# Patient Record
Sex: FEMALE | Race: WHITE | Employment: OTHER | URBAN - METROPOLITAN AREA
[De-identification: names, ages, dates, MRNs, and addresses within clinical notes are randomized per-mention and may not be internally consistent; named-entity substitution may affect disease eponyms.]

---

## 2019-01-03 ENCOUNTER — TRANSFERRED RECORDS (OUTPATIENT)
Dept: HEALTH INFORMATION MANAGEMENT | Facility: CLINIC | Age: 54
End: 2019-01-03

## 2019-05-28 ENCOUNTER — TRANSFERRED RECORDS (OUTPATIENT)
Dept: HEALTH INFORMATION MANAGEMENT | Facility: CLINIC | Age: 54
End: 2019-05-28

## 2019-05-29 ENCOUNTER — TRANSFERRED RECORDS (OUTPATIENT)
Dept: HEALTH INFORMATION MANAGEMENT | Facility: CLINIC | Age: 54
End: 2019-05-29

## 2019-06-12 NOTE — TELEPHONE ENCOUNTER
Images from [place] (detailed below) requested, reports in [facility]      US Thyroid: 5/28/19  CT Abdomen Pelvis with IV Contrast: 5/20/19  CT Chest with IV Contrast: 5/20/19    RECORDS STATUS - ALL OTHER DIAGNOSIS      RECORDS RECEIVED FROM: [place]   DATE RECEIVED:    NOTES STATUS DETAILS   OFFICE NOTE from referring provider IVANIA Tran in [facility]   OFFICE NOTE from medical oncologist     DISCHARGE SUMMARY from hospital     DISCHARGE REPORT from the ER ED [facility] - 5/13/19   OPERATIVE REPORT     MEDICATION LIST [facility] as of 6/4/19   CLINICAL TRIAL TREATMENTS TO DATE     LABS     PATHOLOGY REPORTS NA    ANYTHING RELATED TO DIAGNOSIS Epic/ 5/2819   GENONOMIC TESTING     TYPE:     IMAGING (NEED IMAGES & REPORT)     CT SCANS [place] Report in [facility] - Requested   MRI     MAMMO     ULTRASOUND [place] Report in [facility] - Requested   PET

## 2019-06-12 NOTE — TELEPHONE ENCOUNTER
ONCOLOGY INTAKE: Records Information      APPT INFORMATION:  Referring provider:  Aggie Us  Referring provider s clinic:  [facility]  Reason for visit/diagnosis:  Lung nodule  Has patient been notified of appointment date and time?: Per PT    RECORDS INFORMATION:  Were the records received with the referral (via [employer])? No    Has patient been seen for any external appt for this diagnosis? Per PT, all records and imaging @ [place]    ADDITIONAL INFORMATION:  Chest CT @ [place] on 5/20/19

## 2019-07-10 ENCOUNTER — OFFICE VISIT (OUTPATIENT)
Dept: PULMONOLOGY | Facility: CLINIC | Age: 54
End: 2019-07-10
Attending: INTERNAL MEDICINE
Payer: MEDICARE

## 2019-07-10 ENCOUNTER — PRE VISIT (OUTPATIENT)
Dept: PULMONOLOGY | Facility: CLINIC | Age: 54
End: 2019-07-10

## 2019-07-10 VITALS
HEART RATE: 86 BPM | HEIGHT: 65 IN | OXYGEN SATURATION: 93 % | SYSTOLIC BLOOD PRESSURE: 98 MMHG | WEIGHT: 106.1 LBS | DIASTOLIC BLOOD PRESSURE: 63 MMHG | RESPIRATION RATE: 18 BRPM | BODY MASS INDEX: 17.68 KG/M2

## 2019-07-10 DIAGNOSIS — R91.1 LUNG NODULE: Primary | ICD-10-CM

## 2019-07-10 DIAGNOSIS — M54.89 OTHER CHRONIC BACK PAIN: ICD-10-CM

## 2019-07-10 DIAGNOSIS — G89.29 OTHER CHRONIC BACK PAIN: ICD-10-CM

## 2019-07-10 PROCEDURE — G0463 HOSPITAL OUTPT CLINIC VISIT: HCPCS | Mod: ZF

## 2019-07-10 RX ORDER — DIAZEPAM 5 MG
15 TABLET ORAL DAILY
Status: ON HOLD | COMMUNITY
Start: 2019-06-19 | End: 2019-08-13

## 2019-07-10 RX ORDER — BACLOFEN 10 MG/1
10 TABLET ORAL 2 TIMES DAILY
COMMUNITY
Start: 2019-05-20 | End: 2019-11-16

## 2019-07-10 RX ORDER — ONDANSETRON 4 MG/1
4 TABLET, ORALLY DISINTEGRATING ORAL EVERY 6 HOURS PRN
COMMUNITY
Start: 2019-03-28

## 2019-07-10 RX ORDER — MELOXICAM 7.5 MG/1
1 TABLET ORAL
COMMUNITY
Start: 2019-06-04

## 2019-07-10 ASSESSMENT — PAIN SCALES - GENERAL: PAINLEVEL: WORST PAIN (10)

## 2019-07-10 ASSESSMENT — MIFFLIN-ST. JEOR: SCORE: 1072.15

## 2019-07-10 NOTE — PROGRESS NOTES
[facility] Nod[facility] Initial Visit    Reason for Visit  Julia Boudreaux is a 54 year old female who is referred by Dr Aggie Us for lung nodule  Pulmonary HPI    - Morning cough every day, usually dry or clear mucus maybe for a few years.   - CT done to evaluate weight loss. She was 170 in 2009. Lost about 30# last year, total weight loss over last 2-3 years. She experiences pain severe enough to be physically sick, including vomiting. She is weak, notices legs twitching. She has difficulty swallowing chicken broth but nothing else.       Other active medical problems include:   - back pain, seeking 2nd opinion. She was offered a neurostimulator     Exposure history: Denies asbestos or radon exposure   TB risk factors: No  Prior Imaging:No  Constitutional Symptoms: Night sweats  Personal history of cancer:No  Up to date on age-appropriate cancer screening:colon and pap ok, mammo overdue    ROS Pulmonary  Dyspnea: No, Cough: No, Chest pain: No, Wheezing: No, Sputum Production: No, Hemoptysis: No  A complete ROS was otherwise negative except as noted in the HPI.  The patient was seen and examined by Jennifer Allen MD   Current Outpatient Medications   Medication     DULoxetine (CYMBALTA) 60 MG capsule     GABAPENTIN     omeprazole 20 MG tablet     atorvastatin (LIPITOR) 40 MG tablet     ESTRADIOL 0.025/ESTRIOL 0.1 MG/GM CREAM     HYDROcodone-acetaminophen (VICODIN) 5-500 MG per tablet     nortriptyline (PAMELOR) 25 MG capsule     No current facility-administered medications for this visit.      Allergies   Allergen Reactions     Latex Rash     Liquid Adhesive Rash     Social History     Socioeconomic History     Marital status:      Spouse name: Octavio     Number of children: 4     Years of education: Not on file     Highest education level: Not on file   Occupational History     Employer: HOMEMAKER   Social Needs     Financial resource strain: Not on file     Food  insecurity:     Worry: Not on file     Inability: Not on file     Transportation needs:     Medical: Not on file     Non-medical: Not on file   Tobacco Use     Smoking status: Current Every Day Smoker     Packs/day: 0.30     Smokeless tobacco: Never Used     Tobacco comment: declines quit line 2-12-13   Substance and Sexual Activity     Alcohol use: Yes     Comment: rare     Drug use: No     Sexual activity: Yes     Partners: Male     Birth control/protection: Surgical   Lifestyle     Physical activity:     Days per week: Not on file     Minutes per session: Not on file     Stress: Not on file   Relationships     Social connections:     Talks on phone: Not on file     Gets together: Not on file     Attends Jain service: Not on file     Active member of club or organization: Not on file     Attends meetings of clubs or organizations: Not on file     Relationship status: Not on file     Intimate partner violence:     Fear of current or ex partner: Not on file     Emotionally abused: Not on file     Physically abused: Not on file     Forced sexual activity: Not on file   Other Topics Concern      Service Not Asked     Blood Transfusions No     Caffeine Concern Yes     Occupational Exposure Not Asked     Hobby Hazards Not Asked     Sleep Concern No     Stress Concern No     Weight Concern Yes     Special Diet No     Back Care Yes     Exercise Yes     Bike Helmet Not Asked     Seat Belt Yes     Self-Exams Yes   Social History Narrative     Not on file     Past Medical History:   Diagnosis Date     Abnormal Papanicolaou smear of cervix and cervical HPV     Abn. 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    Hospitalized     Past Surgical History:  "  Procedure Laterality Date     C COMBINED ANT/POST COLPORRHAPHY  02/22/13     C LAMINOTOMY,LUMBAR DISK,1 INTRSP  03/28/08     C LIGATE FALLOPIAN TUBE       HYSTERECTOMY, ANDRES  2002    bleeding     rw back  11-1-12;11-15-12    2 nerves cut and cauterized in low back     Family History   Problem Relation Age of Onset     Diabetes Father         ETOH abuse     Cardiovascular Maternal Grandmother         stroke     Alzheimer Disease No family hx of      Cancer No family hx of         breast, colon, uterine, ovarian     Diabetes Father      Heart Disease No family hx of      Thyroid Disease No family hx of      Anesthesia Reaction No family hx of        Exam:   Ht 1.651 m (5' 5\")   Wt 48.1 kg (106 lb 1.6 oz)   BMI 17.66 kg/m    GENERAL APPEARANCE: Well developed, well nourished, alert, and in no apparent distress.  EYES: PERRL, EOMI  HENT: Nasal mucosa with no edema and no hyperemia. No nasal polyps.  EARS: Canals clear, TMs normal  MOUTH: Oral mucosa is moist, without any lesions, no tonsillar enlargement, no oropharyngeal exudate.  NECK: supple, no masses, no thyromegaly.  LYMPHATICS: No significant axillary, cervical, or supraclavicular nodes.  RESP: normal percussion, good air flow throughout.  No crackles. No rhonchi. No wheezes.  CV: Normal S1, S2, regular rhythm, normal rate. No murmur.  No rub. No gallop. No LE edema.   ABDOMEN:  Bowel sounds normal, soft, nontender, no HSM or masses.   MS: extremities normal. No clubbing. No cyanosis.  SKIN: no rash on limited exam  NEURO: Mentation intact, speech normal, normal strength and tone, normal gait and stance  PSYCH: mentation appears normal. and affect normal/bright  Results:  - My interpretation of the images relevant for this visit includes: CT chest [place] 5/20 RUL cavitary nodule and right hilar lymph node     - My interpretation of the PFT's relevant for this visit includes: None     Culprit Nodule(s):   1: Right upper lobe nodule and is 21 mm in size/severity " and spiculated and lobulated in morphology/quality. First seen by chest CT on 5/20/19. First observed on this date .    1 cm right hilar lymph node also noted.    Assessment and plan: Julia is a 53 yo female with weight loss and lung nodule  Lung nodule - seen on chest CT, done to evaluate weight loss. Heavy smoking history. Emphysema on CT, no diagnosis of COPD.    Considering patient with high risk for lung cancer, recommend PET. She is going to get it in [facility] and depending on result, we will plan for bronchoscopy at [facility]. Percutaneous approach to lung nodule is complicated by location.   Nicotine dependence - declined cessation assistance/ quit attempt     Nodule malignancy risk based on [facility]aux model: 61%

## 2019-07-10 NOTE — NURSING NOTE
"  Oncology Rooming Note    July 10, 2019 4:59 PM   Julia Boudreaux is a 54 year old female who presents for:    Chief Complaint   Patient presents with     New Patient     NEW PT; LUNG NODULE; VITALS COMPLETED BY [facility]      Initial Vitals: BP 98/63   Pulse 86   Resp 18   Ht 1.651 m (5' 5\")   Wt 48.1 kg (106 lb 1.6 oz)   SpO2 93%   BMI 17.66 kg/m   Estimated body mass index is 17.66 kg/m  as calculated from the following:    Height as of this encounter: 1.651 m (5' 5\").    Weight as of this encounter: 48.1 kg (106 lb 1.6 oz). Body surface area is 1.49 meters squared.  Worst Pain (10) Comment: Data Unavailable   No LMP recorded. Patient has had a hysterectomy.  Allergies reviewed: Yes  Medications reviewed: Yes    Medications: Medication refills not needed today.  Pharmacy name entered into [employer]: [id number] - [place], [place] - [address]    Clinical concerns: No new concerns today Dr. Allen was notified.      Julia Yuan              "

## 2019-07-10 NOTE — PATIENT INSTRUCTIONS
I am ordering a PET scan, can be done in [place]. We will send the referral.     Depending on results, we may plan to do bronchoscopy at [facility]    Dr Allen [facility]  Interventional Pulmonology and Lung Nodule Clinic  [facility]  ?[address] [place], [place]  Fax ?[phone]?  Phone [phone]    ---------------------------------------

## 2019-07-10 NOTE — LETTER
7/10/2019       RE: Julia Boudreaux  [id number] [facility] [address]     Dear Colleague,    Thank you for referring your patient, Julia Boudreaux, to the [facility]. Please see a copy of my visit note below.    [facility] Nod[facility] Initial Visit    Reason for Visit  Julia Boudreaux is a 54 year old female who is referred by Dr Aggie Us for lung nodule  Pulmonary HPI    - Morning cough every day, usually dry or clear mucus maybe for a few years.   - CT done to evaluate weight loss. She was 170 in 2009. Lost about 30# last year, total weight loss over last 2-3 years. She experiences pain severe enough to be physically sick, including vomiting. She is weak, notices legs twitching. She has difficulty swallowing chicken broth but nothing else.       Other active medical problems include:   - back pain, seeking 2nd opinion. She was offered a neurostimulator     Exposure history: Denies asbestos or radon exposure   TB risk factors: No  Prior Imaging:No  Constitutional Symptoms: Night sweats  Personal history of cancer:No  Up to date on age-appropriate cancer screening:colon and pap ok, mammo overdue    ROS Pulmonary  Dyspnea: No, Cough: No, Chest pain: No, Wheezing: No, Sputum Production: No, Hemoptysis: No  A complete ROS was otherwise negative except as noted in the HPI.  The patient was seen and examined by Jennifer Allen MD   Current Outpatient Medications   Medication     DULoxetine (CYMBALTA) 60 MG capsule     GABAPENTIN     omeprazole 20 MG tablet     atorvastatin (LIPITOR) 40 MG tablet     ESTRADIOL 0.025/ESTRIOL 0.1 MG/GM CREAM     HYDROcodone-acetaminophen (VICODIN) 5-500 MG per tablet     nortriptyline (PAMELOR) 25 MG capsule     No current facility-administered medications for this visit.      Allergies   Allergen Reactions     Latex Rash     Liquid Adhesive Rash     Social History      Socioeconomic History     Marital status:      Spouse name: Octavio     Number of children: 4     Years of education: Not on file     Highest education level: Not on file   Occupational History     Employer: HOMEMAKER   Social Needs     Financial resource strain: Not on file     Food insecurity:     Worry: Not on file     Inability: Not on file     Transportation needs:     Medical: Not on file     Non-medical: Not on file   Tobacco Use     Smoking status: Current Every Day Smoker     Packs/day: 0.30     Smokeless tobacco: Never Used     Tobacco comment: declines quit line 2-12-13   Substance and Sexual Activity     Alcohol use: Yes     Comment: rare     Drug use: No     Sexual activity: Yes     Partners: Male     Birth control/protection: Surgical   Lifestyle     Physical activity:     Days per week: Not on file     Minutes per session: Not on file     Stress: Not on file   Relationships     Social connections:     Talks on phone: Not on file     Gets together: Not on file     Attends Roman Catholic service: Not on file     Active member of club or organization: Not on file     Attends meetings of clubs or organizations: Not on file     Relationship status: Not on file     Intimate partner violence:     Fear of current or ex partner: Not on file     Emotionally abused: Not on file     Physically abused: Not on file     Forced sexual activity: Not on file   Other Topics Concern      Service Not Asked     Blood Transfusions No     Caffeine Concern Yes     Occupational Exposure Not Asked     Hobby Hazards Not Asked     Sleep Concern No     Stress Concern No     Weight Concern Yes     Special Diet No     Back Care Yes     Exercise Yes     Bike Helmet Not Asked     Seat Belt Yes     Self-Exams Yes   Social History Narrative     Not on file     Past Medical History:   Diagnosis Date     Abnormal Papanicolaou smear of cervix and cervical HPV     Abn. Pap smear (cervix)     Calculus of kidney      Displacement  "of lumbar intervertebral disc without myelopathy 3/29/08    Hospitalized     Endometriosis, site unspecified     Endometriosis     Major depressive disorder, single episode, moderate (H)      Prolapse of vaginal vault after hysterectomy 2/23/13    Hospitalized     Past Surgical History:   Procedure Laterality Date     C COMBINED ANT/POST COLPORRHAPHY  02/22/13     C LAMINOTOMY,LUMBAR DISK,1 INTRSP  03/28/08     C LIGATE FALLOPIAN TUBE       HYSTERECTOMY, ANDRES  2002    bleeding     rw back  11-1-12;11-15-12    2 nerves cut and cauterized in low back     Family History   Problem Relation Age of Onset     Diabetes Father         ETOH abuse     Cardiovascular Maternal Grandmother         stroke     Alzheimer Disease No family hx of      Cancer No family hx of         breast, colon, uterine, ovarian     Diabetes Father      Heart Disease No family hx of      Thyroid Disease No family hx of      Anesthesia Reaction No family hx of        Exam:   Ht 1.651 m (5' 5\")   Wt 48.1 kg (106 lb 1.6 oz)   BMI 17.66 kg/m     GENERAL APPEARANCE: Well developed, well nourished, alert, and in no apparent distress.  EYES: PERRL, EOMI  HENT: Nasal mucosa with no edema and no hyperemia. No nasal polyps.  EARS: Canals clear, TMs normal  MOUTH: Oral mucosa is moist, without any lesions, no tonsillar enlargement, no oropharyngeal exudate.  NECK: supple, no masses, no thyromegaly.  LYMPHATICS: No significant axillary, cervical, or supraclavicular nodes.  RESP: normal percussion, good air flow throughout.  No crackles. No rhonchi. No wheezes.  CV: Normal S1, S2, regular rhythm, normal rate. No murmur.  No rub. No gallop. No LE edema.   ABDOMEN:  Bowel sounds normal, soft, nontender, no HSM or masses.   MS: extremities normal. No clubbing. No cyanosis.  SKIN: no rash on limited exam  NEURO: Mentation intact, speech normal, normal strength and tone, normal gait and stance  PSYCH: mentation appears normal. and affect normal/bright  Results:  - My " interpretation of the images relevant for this visit includes: CT chest [place] 5/20 RUL cavitary nodule and right hilar lymph node     - My interpretation of the PFT's relevant for this visit includes: None     Culprit Nodule(s):   1: Right upper lobe nodule and is 21 mm in size/severity and spiculated and lobulated in morphology/quality. First seen by chest CT on 5/20/19. First observed on this date .    1 cm right hilar lymph node also noted.    Assessment and plan: Julia is a 53 yo female with weight loss and lung nodule  Lung nodule - seen on chest CT, done to evaluate weight loss. Heavy smoking history. Emphysema on CT, no diagnosis of COPD.    Considering patient with high risk for lung cancer, recommend PET. She is going to get it in [facility] and depending on result, we will plan for bronchoscopy at [facility]. Percutaneous approach to lung nodule is complicated by location.   Nicotine dependence - declined cessation assistance/ quit attempt     Nodule malignancy risk based on [facility]/[employer] model: 61%      Again, thank you for allowing me to participate in the care of your patient.      Sincerely,    Jennifer Allen MD

## 2019-07-12 ENCOUNTER — NURSE TRIAGE (OUTPATIENT)
Dept: NURSING | Facility: CLINIC | Age: 54
End: 2019-07-12

## 2019-07-13 NOTE — TELEPHONE ENCOUNTER
"General questions about coming to ED - asks if she would be admitted, is there a back specialist she could see at the hospital over the weekend. \"I don't want to drive all the way there and just get a shot and get sent home\" Pt has been having problems for many months w/ unexplained wt loss and generalized weakness. PCP and specialist so far have not found an explanation. Advised we triage her current sx but pt declined. Wants to know what will happen at ED. Explained we cannot answer this question especially without more information.   "

## 2019-07-15 ENCOUNTER — TELEPHONE (OUTPATIENT)
Dept: PULMONOLOGY | Facility: CLINIC | Age: 54
End: 2019-07-15

## 2019-07-15 NOTE — TELEPHONE ENCOUNTER
[facility] Call Center    Phone Message    May a detailed message be left on voicemail: yes    Reason for Call: Other: [facility] is unable to complete the order for the PET scan.  [facility] indicates that they do not have a PET scan machine.   Recommendation is the complete order throught the [facility]. Please call Vanita to confirm receipt of message.     Action Taken: Message routed to:  [facility] ([facility] pulmonary

## 2019-07-16 NOTE — TELEPHONE ENCOUNTER
I returned the call to [facility]. They are unable to perform PET in [place], it has been set up for [facility] this week. Based on results, will determine diagnostic approach. (possible bronchoscopy)

## 2019-07-16 NOTE — TELEPHONE ENCOUNTER
----- Message from Awilda Mann RN sent at 7/16/2019  8:42 AM CDT -----  Hi,    Voicemail message received from [facility], calling on behalf of Dr. Rodriguez.  They would like to discuss  mutual pt of Dr. Yanez.  Please follow up with Clinic Nurse at [phone].    Thanks, Awilda

## 2019-07-18 ENCOUNTER — HOSPITAL ENCOUNTER (OUTPATIENT)
Dept: PET IMAGING | Facility: CLINIC | Age: 54
Discharge: HOME | End: 2019-07-18
Attending: INTERNAL MEDICINE | Admitting: INTERNAL MEDICINE
Payer: MEDICARE

## 2019-07-18 DIAGNOSIS — R91.1 LUNG NODULE: ICD-10-CM

## 2019-07-18 LAB — GLUCOSE BLDC GLUCOMTR-MCNC: 108 MG/DL (ref 70–99)

## 2019-07-18 PROCEDURE — 25000128 H RX IP 250 OP 636: Performed by: INTERNAL MEDICINE

## 2019-07-18 PROCEDURE — 82962 GLUCOSE BLOOD TEST: CPT

## 2019-07-18 PROCEDURE — 71260 CT THORAX DX C+: CPT | Mod: PI

## 2019-07-18 PROCEDURE — 74177 CT ABD & PELVIS W/CONTRAST: CPT | Mod: PI

## 2019-07-18 PROCEDURE — 34300033 ZZH RX 343: Performed by: INTERNAL MEDICINE

## 2019-07-18 PROCEDURE — A9552 F18 FDG: HCPCS | Performed by: INTERNAL MEDICINE

## 2019-07-18 RX ORDER — IOPAMIDOL 755 MG/ML
20-135 INJECTION, SOLUTION INTRAVASCULAR ONCE
Status: COMPLETED | OUTPATIENT
Start: 2019-07-18 | End: 2019-07-18

## 2019-07-18 RX ADMIN — FLUDEOXYGLUCOSE F-18 10.19 MCI.: 500 INJECTION, SOLUTION INTRAVENOUS at 12:40

## 2019-07-18 RX ADMIN — IOPAMIDOL 59 ML: 755 INJECTION, SOLUTION INTRAVENOUS at 13:27

## 2019-07-19 ENCOUNTER — TELEPHONE (OUTPATIENT)
Dept: ONCOLOGY | Facility: CLINIC | Age: 54
End: 2019-07-19

## 2019-07-19 DIAGNOSIS — R91.8 PULMONARY NODULES: Primary | ICD-10-CM

## 2019-07-19 DIAGNOSIS — R91.1 LUNG NODULE: Primary | ICD-10-CM

## 2019-07-19 NOTE — TELEPHONE ENCOUNTER
ONCOLOGY INTAKE: Records Information      APPT INFORMATION: 7/25/19 - [place] [facility]  [place] provider:  Tiffany  Referring provider s clinic:  [facility]  Reason for visit/diagnosis:  PET avid lung nodule, 1 cm PET negative mediastinal node  Has patient been notified of appointment date and time?: Yes    RECORDS INFORMATION:  Were the records received with the referral (via [employer])? Internal referral    Has patient been seen for any external appt for this diagnosis? No    If yes, where? NA    Has patient had any imaging or procedures outside of [place] for this condition? No      If Yes, where? NA    ADDITIONAL INFORMATION:  Scheduled via in[employer]ion[employer]et from Nirali HARRISON / called pt to confirm

## 2019-07-19 NOTE — TELEPHONE ENCOUNTER
I called Julia to give her the results of PET:    Hypermetabolic irregular-shaped right upper lobe nodule with internal  cavitation and abuts the pleura measures 2.3 x 0.6 cm (series 8 image 36) Max SUV 9.8, 1 cm hilar lymph node not PET avid    She is in agreement to see thoracic surgery with PFTs same day

## 2019-07-22 NOTE — TELEPHONE ENCOUNTER
RECORDS STATUS - ALL OTHER DIAGNOSIS      RECORDS RECEIVED FROM: Epic/[facility]   DATE RECEIVED: 7/25/2019   NOTES STATUS DETAILS   OFFICE NOTE from referring provider Complete  [facility]   OFFICE NOTE from medical oncologist Complete  EPIC   DISCHARGE SUMMARY from hospital     DISCHARGE REPORT from the ER     OPERATIVE REPORT     MEDICATION LIST Complete  Epic/CE    CLINICAL TRIAL TREATMENTS TO DATE N/A    LABS     PATHOLOGY REPORTS N/A    ANYTHING RELATED TO DIAGNOSIS Complete  Recent Labs in [facility]   GENONOMIC TESTING     TYPE:     IMAGING (NEED IMAGES & REPORT)     CT SCANS     MRI     MAMMO     ULTRASOUND     PET Complete  PACS

## 2019-07-25 ENCOUNTER — ONCOLOGY VISIT (OUTPATIENT)
Dept: ONCOLOGY | Facility: CLINIC | Age: 54
End: 2019-07-25
Attending: THORACIC SURGERY (CARDIOTHORACIC VASCULAR SURGERY)
Payer: MEDICARE

## 2019-07-25 ENCOUNTER — HOSPITAL ENCOUNTER (OUTPATIENT)
Dept: LAB | Facility: CLINIC | Age: 54
End: 2019-07-25
Attending: INTERNAL MEDICINE
Payer: MEDICARE

## 2019-07-25 ENCOUNTER — PRE VISIT (OUTPATIENT)
Dept: ONCOLOGY | Facility: CLINIC | Age: 54
End: 2019-07-25

## 2019-07-25 ENCOUNTER — HOSPITAL ENCOUNTER (OUTPATIENT)
Dept: RESPIRATORY THERAPY | Facility: CLINIC | Age: 54
Discharge: HOME | End: 2019-07-25
Attending: INTERNAL MEDICINE | Admitting: INTERNAL MEDICINE
Payer: MEDICARE

## 2019-07-25 ENCOUNTER — HOSPITAL ENCOUNTER (OUTPATIENT)
Facility: CLINIC | Age: 54
Setting detail: SPECIMEN
End: 2019-07-25
Attending: THORACIC SURGERY (CARDIOTHORACIC VASCULAR SURGERY)
Payer: MEDICARE

## 2019-07-25 VITALS
HEART RATE: 62 BPM | RESPIRATION RATE: 16 BRPM | OXYGEN SATURATION: 100 % | DIASTOLIC BLOOD PRESSURE: 67 MMHG | HEIGHT: 65 IN | WEIGHT: 106 LBS | SYSTOLIC BLOOD PRESSURE: 105 MMHG | TEMPERATURE: 96.9 F | BODY MASS INDEX: 17.66 KG/M2

## 2019-07-25 DIAGNOSIS — R91.8 PULMONARY NODULES: ICD-10-CM

## 2019-07-25 DIAGNOSIS — J98.4 CAVITATING MASS IN RIGHT UPPER LUNG LOBE: ICD-10-CM

## 2019-07-25 LAB
DLCOCOR-%PRED-PRE: 82 %
DLCOCOR-PRE: 17.61 ML/MIN/MMHG
DLCOUNC-%PRED-PRE: 87 %
DLCOUNC-PRE: 18.56 ML/MIN/MMHG
DLCOUNC-PRED: 21.25 ML/MIN/MMHG
ERV-%PRED-PRE: 127 %
ERV-PRE: 1.75 L
ERV-PRED: 1.38 L
EXPTIME-PRE: 7.57 SEC
FEF2575-%PRED-PRE: 53 %
FEF2575-PRE: 1.34 L/SEC
FEF2575-PRED: 2.51 L/SEC
FEFMAX-%PRED-PRE: 69 %
FEFMAX-PRE: 4.61 L/SEC
FEFMAX-PRED: 6.65 L/SEC
FEV1-%PRED-PRE: 90 %
FEV1-PRE: 2.43 L
FEV1FEV6-PRE: 65 %
FEV1FEV6-PRED: 81 %
FEV1FVC-PRE: 64 %
FEV1FVC-PRED: 80 %
FEV1SVC-PRE: 66 %
FEV1SVC-PRED: 77 %
FIFMAX-PRE: 3.9 L/SEC
FRCPLETH-%PRED-PRE: 159 %
FRCPLETH-PRE: 4.38 L
FRCPLETH-PRED: 2.75 L
FVC-%PRED-PRE: 112 %
FVC-PRE: 3.82 L
FVC-PRED: 3.41 L
GAW-%PRED-PRE: 28 %
GAW-PRE: 0.29 L/S/CMH2O
GAW-PRED: 1.03 L/S/CMH2O
HGB BLD-MCNC: 15.3 G/DL (ref 11.7–15.7)
IC-%PRED-PRE: 91 %
IC-PRE: 1.92 L
IC-PRED: 2.11 L
RVPLETH-%PRED-PRE: 139 %
RVPLETH-PRE: 2.63 L
RVPLETH-PRED: 1.88 L
SGAW-%PRED-PRE: 71 %
SGAW-PRE: 0.07 1/CMH2O*S
SGAW-PRED: 0.1 1/CMH2O*S
SRAW-%PRED-PRE: 289 %
SRAW-PRE: 13.78 CMH2O*S
SRAW-PRED: 4.76 CMH2O*S
TLCPLETH-%PRED-PRE: 123 %
TLCPLETH-PRE: 6.3 L
TLCPLETH-PRED: 5.11 L
VA-%PRED-PRE: 109 %
VA-PRE: 5.52 L
VC-%PRED-PRE: 105 %
VC-PRE: 3.67 L
VC-PRED: 3.48 L

## 2019-07-25 PROCEDURE — 94726 PLETHYSMOGRAPHY LUNG VOLUMES: CPT

## 2019-07-25 PROCEDURE — 85018 HEMOGLOBIN: CPT | Performed by: INTERNAL MEDICINE

## 2019-07-25 PROCEDURE — G0463 HOSPITAL OUTPT CLINIC VISIT: HCPCS

## 2019-07-25 PROCEDURE — 94729 DIFFUSING CAPACITY: CPT

## 2019-07-25 PROCEDURE — 99204 OFFICE O/P NEW MOD 45 MIN: CPT | Performed by: THORACIC SURGERY (CARDIOTHORACIC VASCULAR SURGERY)

## 2019-07-25 PROCEDURE — 94010 BREATHING CAPACITY TEST: CPT

## 2019-07-25 RX ORDER — OXYCODONE HYDROCHLORIDE 5 MG/1
5-10 TABLET ORAL EVERY 6 HOURS PRN
COMMUNITY
Start: 2019-07-24 | End: 2020-06-04

## 2019-07-25 ASSESSMENT — PAIN SCALES - GENERAL: PAINLEVEL: WORST PAIN (10)

## 2019-07-25 ASSESSMENT — MIFFLIN-ST. JEOR: SCORE: 1071.69

## 2019-07-25 NOTE — PROGRESS NOTES
PFT Note: Patient completed pulmonary function testing with spirometry, lung volumes and diffusion. Good patient effort and cooperation. The results of this test met the ATS standards for acceptability and repeatability.

## 2019-07-25 NOTE — NURSING NOTE
Patient here for pre-op instructions for possible right VATS, Wedge resection, possible lobectomy. Handouts given and reviewed. The patient was instructed to stop aspirin, ibuprofen, naproxen, NSAIDS, fish oil/flax seed oil, Vit E one week before surgery.  NPO guidelines and showering instructions given. Patient given 2 bottles of CHG soap for pre-op showering. Patient is aware she may have overnoc hospital stay and will need a  to and from the hospital. The patient was instructed to notify the provider if there are any signs of a cold/flu prior to surgery. On the day before surgery, the patient was instructed to avoid smoking, chewing tobacco, or drinking alcohol. The hospital arrival time and estimated time for procedure were not given to the patient as the procedure date/time are still TBD. Per Dr. Roman a PAC evaluation will be scheduled prior to surgery.    Post-op: Reviewed the following points of when to contact the MD: Signs of infection including - Temp >/= 100.4, chills, bloody drainage from surgical site, warmth/redness at surgical site; Increased drainage from surgical site; Difficulty breathing/SOB; Dizziness/light-headedness; persistent cough. Is aware s/he will return home with narcotic pain med and should not drive until off medication. If post-op pain is not well controlled by prescribed pain med they should call the clinic. The patient was notified of post-op restrictions including: no heavy lifting >/= 15 lbs for 2 weeks; no drastic changes in air pressure (no flying or scuba diving). Patient is aware s/he needs to remain well hydrated and eat a protein-rich diet post op. Verbalized understanding of potential post-op complications.        Pt instructed to call with further questions or concerns.  Patient verbalized understanding and is in agreement with this plan.  Copy of appointments, and after visit summary (AVS) given to patient. Surgical consents will be signed prior to surgery.  Patient given contact information and will reach out to care coordinator or MD with additional questions or concerns. Patient discharged ambulatory and in no distress.    Writer will f/u with Dr. Roman to find out if the patient can take her meloxicam or if she needs to hold it prior to surgery.    Nirali Valdez, RN, BSN  Patient Care Coordinator  [facility]  [phone]

## 2019-07-25 NOTE — NURSING NOTE
"Oncology Rooming Note    July 25, 2019 12:28 PM   Julia Boudreaux is a 54 year old female who presents for:    Chief Complaint   Patient presents with     Oncology Clinic Visit     New Patient     Initial Vitals: /67   Pulse 62   Temp 96.9  F (36.1  C) (Tympanic)   Resp 16   Ht 1.651 m (5' 5\")   Wt 48.1 kg (106 lb)   SpO2 100%   BMI 17.64 kg/m   Estimated body mass index is 17.64 kg/m  as calculated from the following:    Height as of this encounter: 1.651 m (5' 5\").    Weight as of this encounter: 48.1 kg (106 lb). Body surface area is 1.49 meters squared.  Worst Pain (10) Comment: Data Unavailable   No LMP recorded. Patient has had a hysterectomy.  Allergies reviewed: Yes  Medications reviewed: Yes    Medications: Medication refills not needed today.  Pharmacy name entered into [employer]: [id number] - [place], [place] - [address]    Clinical concerns: New Patient       Latonya Lopez CMA              "

## 2019-07-25 NOTE — PROGRESS NOTES
THORACIC SURGERY - NEW PATIENT OFFICE VISIT      Dear Dr. Us,    I saw Ms. Boudreaux at Dr. Allen s request in consultation for the evaluation and treatment of a right upper lobe lung nodule.     HPI  Ms. Julia Boudreaux is a 54 year old woman with a dry cough and weight loss of 30 pounds over the past year and 65 pounds over the last 3 years.  Despite working out regularly prior to that, she had not been able to lose weight, so this weight loss was alarming to her.  She is now fairly stable at around 100 pounds.      She has significant back pain and is unable to walk more than one block, at most.  She uses a cane to walk and a wheelchair when going longer distances.  She has leg pain related to the back pain and has undergone two back surgeries in the past (first in 2008).  She has been offered a neurostimulator for the back issues.  She notes night sweats, but has no fevers.  It may be related to pain.  Her medication list includes Mobic7.5 mg daily, Baclofen 10 mg BID, Valium 5 mg TID, Cymbalta 60 mg daily, gabapentin 1200 mg TID and medical marijuana (vapes).     Previsit Tests   CT chest 5/20/2019:        PET 7/18/2019:  Hypermetabolic irregular-shaped right upper lobe nodule with internal  cavitation and abuts the pleura measures 2.3 x 0.6 cm.  Max SUV 9.8      PMH    Past Medical History:   Diagnosis Date     Abnormal Papanicolaou smear of cervix and cervical HPV     Abn. 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    Hospitalized        PSH    Past Surgical History:   Procedure Laterality Date     C COMBINED ANT/POST COLPORRHAPHY  02/22/13     C LAMINOTOMY,LUMBAR DISK,1 INTRSP  03/28/08     C LIGATE FALLOPIAN TUBE       HYSTERECTOMY, [facility]  2002     "bleeding     rw back  11-1-12;11-15-12    2 nerves cut and cauterized in low back        ETOH:  Rare alcohol  TOB:  Started age 13 and averaged a pack a day over her lifetime - 41 pack years.  Current smoker.  Vaping marijuana for last 15 months - for pain control    Physical examination  /67   Pulse 62   Temp 96.9  F (36.1  C) (Tympanic)   Resp 16   Ht 1.651 m (5' 5\")   Wt 48.1 kg (106 lb)   SpO2 100%   BMI 17.64 kg/m     Physical Exam   Constitutional: She is oriented to person, place, and time.   In wheelchair, thing, but not cachectic   Eyes: Conjunctivae and EOM are normal.   Neck: Normal range of motion. Neck supple.   Cardiovascular: Normal rate, regular rhythm and normal heart sounds.   Pulmonary/Chest: Effort normal and breath sounds normal.   Lymphadenopathy:     She has no cervical adenopathy.   Neurological: She is alert and oriented to person, place, and time.   Skin: Skin is warm.   Psychiatric: She has a normal mood and affect. Her behavior is normal. Judgment and thought content normal.        From a personal perspective, she is retired from working as a manager at [employer].  She lives with her , pregnant daughter and two small grandchildren.    IMPRESSION (J98.4) Cavitating mass in right upper lung lobe  This is a 54 year old woman with an FDG avid right upper lobe lung nodule, which is concerning for a primaly lung cancer.    PLAN  I spent a total of 45 minutes with Ms. Boudreaux and her , more than 50% of which were spent in counseling, coordination of care, and face-to-face time. I reviewed the plan as follows:  Procedure planned: Right VATS, wedge resection, possible lobectomy, possible robot-assisted, possible TEMLA.  I will consider percutaneous biopsy, but it is likely not possible.  Further, the location makes stereotactic radiation less likely as an option.  Therefore, I recommend operative resection.  We will consult the pain service for assistance with postoperative " pain control.    I discussed the risks and benefits of the operation, including obtaining a diagnosis, resecting and staging the cancer. The risks include bleeding, infection, arrhythmia requiring medication or anticoagulation, prolonged air leak, UTI, chylothorax, DVT and PE, and death.  There is also a risk of prolonged pain, which could require further treatment.  Prolonged air leak could be treated with bronchoscopy and endobronchial valve insertion.  Postoperative bleeding (rare) could require a return to the OR.   The risks of TEMLA include bleeding requiring sternotomy or thoracotomy. There is also a risk of recurrent laryngeal nerve injury, which can lead to the need for a separate procedure by the ENT service.    Consent: pending  Necessary Preop Tests & Appointments: PAC  Regional Anesthesia Plan: Erector spinae block  Anticoagulation Plan: Lovenox  Smoking Cessation: I advised Mrs. Boudreaux that she needs to stop smoking.  I will operate when she has quit for at least three weeks.    All questions were answered and Julia Boudreaux and present family were in agreement with the plan.  I appreciate the opportunity to participate in the care of your patient and will keep you updated.  Sincerely,     Joseph Roman

## 2019-07-25 NOTE — LETTER
7/25/2019         RE: Julia Boudreaux  [address] [facility] [id number]        Dear Colleague,    Thank you for referring your patient, Julia Boudreaux, to the [facility]. Please see a copy of my visit note below.    THORACIC SURGERY - NEW PATIENT OFFICE VISIT      Dear Dr. Us,    I saw Ms. Boudreaux at Dr. Allen s request in consultation for the evaluation and treatment of a right upper lobe lung nodule.     HPI  Ms. Julia Boudreaux is a 54 year old woman with a dry cough and weight loss of 30 pounds over the past year and 65 pounds over the last 3 years.  Despite working out regularly prior to that, she had not been able to lose weight, so this weight loss was alarming to her.  She is now fairly stable at around 100 pounds.      She has significant back pain and is unable to walk more than one block, at most.  She uses a cane to walk and a wheelchair when going longer distances.  She has leg pain related to the back pain and has undergone two back surgeries in the past (first in 2008).  She has been offered a neurostimulator for the back issues.  She notes night sweats, but has no fevers.  It may be related to pain.  Her medication list includes Mobic7.5 mg daily, Baclofen 10 mg BID, Valium 5 mg TID, Cymbalta 60 mg daily, gabapentin 1200 mg TID and medical marijuana (vapes).     Previsit Tests   CT chest 5/20/2019:        PET 7/18/2019:  Hypermetabolic irregular-shaped right upper lobe nodule with internal  cavitation and abuts the pleura measures 2.3 x 0.6 cm.  Max SUV 9.8      PMH    Past Medical History:   Diagnosis Date     Abnormal Papanicolaou smear of cervix and cervical HPV     Abn. Pap smear (cervix)     Calculus of kidney      Displacement of lumbar intervertebral disc without myelopathy 3/29/08    Hospitalized     Endometriosis, site unspecified     Endometriosis     Major depressive disorder, single episode, moderate (H)      Prolapse of vaginal vault after  "hysterectomy 2/23/13    Hospitalized        [facility]    Past Surgical History:   Procedure Laterality Date     C COMBINED ANT/POST COLPORRHAPHY  02/22/13     C LAMINOTOMY,LUMBAR DISK,1 INTRSP  03/28/08     C LIGATE FALLOPIAN TUBE       HYSTERECTOMY, [facility]  2002    bleeding     rw back  11-1-12;11-15-12    2 nerves cut and cauterized in low back        ETOH:  Rare alcohol  TOB:  Started age 13 and averaged a pack a day over her lifetime - 41 pack years.  Current smoker.  Vaping marijuana for last 15 months - for pain control    Physical examination  /67   Pulse 62   Temp 96.9  F (36.1  C) (Tympanic)   Resp 16   Ht 1.651 m (5' 5\")   Wt 48.1 kg (106 lb)   SpO2 100%   BMI 17.64 kg/m      Physical Exam   Constitutional: She is oriented to person, place, and time.   In wheelchair, thing, but not cachectic   Eyes: Conjunctivae and EOM are normal.   Neck: Normal range of motion. Neck supple.   Cardiovascular: Normal rate, regular rhythm and normal heart sounds.   Pulmonary/Chest: Effort normal and breath sounds normal.   Lymphadenopathy:     She has no cervical adenopathy.   Neurological: She is alert and oriented to person, place, and time.   Skin: Skin is warm.   Psychiatric: She has a normal mood and affect. Her behavior is normal. Judgment and thought content normal.        From a personal perspective, she is retired from working as a manager at [employer].  She lives with her , pregnant daughter and two small grandchildren.    IMPRESSION (J98.4) Cavitating mass in right upper lung lobe  This is a 54 year old woman with an FDG avid right upper lobe lung nodule, which is concerning for a primaly lung cancer.    PLAN  I spent a total of 45 minutes with Ms. Boudreaux and her , more than 50% of which were spent in counseling, coordination of care, and face-to-face time. I reviewed the plan as follows:  Procedure planned: Right VATS, wedge resection, possible lobectomy, possible robot-assisted, possible TEMLA.  " I will consider percutaneous biopsy, but it is likely not possible.  Further, the location makes stereotactic radiation less likely as an option.  Therefore, I recommend operative resection.  We will consult the pain service for assistance with postoperative pain control.    I discussed the risks and benefits of the operation, including obtaining a diagnosis, resecting and staging the cancer. The risks include bleeding, infection, arrhythmia requiring medication or anticoagulation, prolonged air leak, UTI, chylothorax, DVT and PE, and death.  There is also a risk of prolonged pain, which could require further treatment.  Prolonged air leak could be treated with bronchoscopy and endobronchial valve insertion.  Postoperative bleeding (rare) could require a return to the OR.   The risks of TEMLA include bleeding requiring sternotomy or thoracotomy. There is also a risk of recurrent laryngeal nerve injury, which can lead to the need for a separate procedure by the ENT service.    Consent: pending  Necessary Preop Tests & Appointments: PAC  Regional Anesthesia Plan: Erector spinae block  Anticoagulation Plan: Lovenox  Smoking Cessation: I advised Mrs. Boudreaux that she needs to stop smoking.  I will operate when she has quit for at least three weeks.    All questions were answered and Julia Boudreaux and present family were in agreement with the plan.  I appreciate the opportunity to participate in the care of your patient and will keep you updated.  Sincerely,     Joseph Roman    Again, thank you for allowing me to participate in the care of your patient.        Sincerely,        Joseph Roman MD

## 2019-07-26 ENCOUNTER — TEAM CONFERENCE (OUTPATIENT)
Dept: SURGERY | Facility: CLINIC | Age: 54
End: 2019-07-26

## 2019-07-26 DIAGNOSIS — J98.4 CAVITATING MASS IN RIGHT UPPER LUNG LOBE: Primary | ICD-10-CM

## 2019-07-26 NOTE — TELEPHONE ENCOUNTER
Pulmonary Nodule Conference      Patient Name: Julia Boudreaux    Reason for conference discussion (brief overview): 53 yo with 30 lb weight loss, significant back pain s/p several surgeries, often using wheelchair for distances.  Current smoker, 41 pack years.  PET avid RUL nodule    Specific Question:  Is IR biopsy possible?    Pertinent Histology:  None    Referring Physician: Joseph Roman MD    The patient's case was presented at the multidisciplinary conference for the above noted reason.  There was a consensus recommendation for the following actions:     In reviewing 5/20 CT scan, Dr. Thomas felt Series 3/Image 56 RUL is accessible for IR.    Case Lead:  Sofy Soto    Interventional Radiology Staff Present: Doreen Thomas MD    Call made to patient, arranged 8/1 appts with IR and PAC clinics.

## 2019-07-29 NOTE — TELEPHONE ENCOUNTER
RECORDS RECEIVED FROM: Lung biopsy consult // per Sofy Soto // [facility]   DATE RECEIVED: 8.1.19   NOTES STATUS DETAILS   OFFICE NOTE from referring provider Internal 7.26.19 Sofy Fraser   OFFICE NOTE from other specialist Care Everywhere/Internal    DISCHARGE SUMMARY from hospital N/A    DISCHARGE REPORT from the ER N/A    OPERATIVE REPORT N/A    MEDICATION LIST Internal    XRAYS (IMAGES & REPORTS) Internal    PATHOLOGY  Slides & report N/A

## 2019-07-29 NOTE — TELEPHONE ENCOUNTER
Action 2019 8:05am PP   Action Taken Faxed request to [facility] for recs.      Action 2019 8:05am PP   Action Taken Gareth received recs from [facility] and sent to [facility].        FUTURE VISIT INFORMATION      SURGERY INFORMATION:    Date: TBD    Location: [facility]    Surgeon:  Dr. Jospeh Roman     Anesthesia Type:  N/A    RECORDS REQUESTED FROM:       Primary Care Provider:  Aggie Lee - [facility]     Pertinent Medical History:  Cavitating mass in right upper lung lobe    Most recent EKG+ Tracin/3/2019 (Care Everywhere) [facility]    Most recent ECHO:  [facility]     Most recent Cardiac Stress Test:  [facility]    Most recent Coronary Angiogram:    Most recent PFT's:  2019    Most recent Sleep Study:

## 2019-07-29 NOTE — PROCEDURES
Procedure Date: 2019      Please see medical chart for graphs and statistics related to this report.       REFERRING PHYSICIAN:   Jennifer Allen                                        TECHNICIAN:   Tahmina Ho      DIAGNOSIS:   Lung Nodule                                                                 HEIGHT:   65.00 inches                                                                   WEIGHT:   98.00 Lbs.       DYSPNEA:   After severe exertion                                                                COUGH:   Productive   WHEEZE:   Rare                                                                      TOBACCO PROD:   Cigarette   YEARS SMOKED:   40.0                                                     PKS/DAY:   1.0   YEARS QUIT:                                                                  POST-TEST COMMENTS:   Good patient effort and cooperation.  The results of testing meet ATS criteria for acceptability and repeatability.       INTERPRETATION:      1.  Mild obstruction   2.  Air trapping   3.  Hyperinflation   4.  Normal gas transfer         EMANUEL HOLCOMB MD             D: 2019   T: 2019   MT: ROSENDO      Name:     MINGO VERONICA   MRN:      [id number]        Account:        [id number]   :      1965           Procedure Date: 2019      Document: [id number]       cc: Joseph Lee MD

## 2019-07-30 ENCOUNTER — PATIENT OUTREACH (OUTPATIENT)
Dept: ONCOLOGY | Facility: CLINIC | Age: 54
End: 2019-07-30

## 2019-07-30 NOTE — PROGRESS NOTES
"Julia called in to clinic today asking if she needs to go to both the PAC eval and the IR consult scheduled on 8/1.     Julia reports she is \"very frustrated\" because she has had \"so many damn surgeries\" and has already had \"several consults\" done. She reports having pain when she is sitting and has been \"unable to walk through a store\" this year. She says the main reason for transferring care from [place] to [facility] was for consulting on her back pain. She said she had an MRI done in May 2019, which was ordered by her PCP, Aggie Us, and done at [facility] in [facility]. She states no one has gone over the MRI results with her.     She also reports she had started losing weight so she had a \"scan done\" and that's where they found this \"small nodule\" and she says she is \"not short of breath.\" She reports she will go to the appointments if she absolutely has to but feels it is \"too much to have to go to if she's already had several work ups.\"     Writer instructed Julia to reach out to her PCP for the MRI results as she is the ordering provider. Writer explained that I would reach out to Dr. Roman's team to see if both consults that are currently scheduled for 8/1 are absolutely necessary. Writer explained that the appts both scheduled on the same day because she lives so far from [place] and we were trying to make things easier for her. Julia verbalized understanding and reports she will go to the appts but wants to know if they are absolutely necessary if Dr. Roman decides to do the biopsy.    Writer will route message to Dr. Roman's team and call Julia back with their recommendations. Julia says either home phone or mobile phone can be used and we can leave a message on either one.    Nirali Valdez, RN, BSN  Patient Care Coordinator  [facility] [place]  [phone]      "

## 2019-07-30 NOTE — PROGRESS NOTES
"Per Dr. Roman's staff message:   \"The problem is they are not too likely to get a biopsy result.  If they can get the biopsy and it shows cancer, I would consider sending her for radiation.  However, if the biopsy is unrevealing of a diagnosis, she needs the operation.  I think it's best for her to do the PAC appointment and the biopsy on the same day to minimize her travel, but she can postpone PAC until the biopsy result comes back.  That would potentially delay an operation, though.\"    Writer called Julia back, there was no message, left message. Writer explained that there is a chance the biopsy doesn't give adequate results. If the biopsy does not show cancer, she may need surgery. If it does show cancer she may need radiation. He is recommending that she keep the appts as scheduled because if she postpones the PAC appt, it may potentially delay the surgery.     Writer explained if she has any further questions or concerns she can call clinic at [phone].     Nirali Valdez RN, BSN  Patient Care Coordinator  [facility]  [phone]      "

## 2019-08-01 ENCOUNTER — ANESTHESIA EVENT (OUTPATIENT)
Dept: SURGERY | Facility: CLINIC | Age: 54
End: 2019-08-01

## 2019-08-01 ENCOUNTER — OFFICE VISIT (OUTPATIENT)
Dept: SURGERY | Facility: CLINIC | Age: 54
End: 2019-08-01
Payer: MEDICARE

## 2019-08-01 ENCOUNTER — OFFICE VISIT (OUTPATIENT)
Dept: INTERVENTIONAL RADIOLOGY/VASCULAR | Facility: CLINIC | Age: 54
End: 2019-08-01
Payer: MEDICARE

## 2019-08-01 ENCOUNTER — PRE VISIT (OUTPATIENT)
Dept: INTERVENTIONAL RADIOLOGY/VASCULAR | Facility: CLINIC | Age: 54
End: 2019-08-01

## 2019-08-01 ENCOUNTER — PRE VISIT (OUTPATIENT)
Dept: SURGERY | Facility: CLINIC | Age: 54
End: 2019-08-01

## 2019-08-01 VITALS
TEMPERATURE: 97.6 F | HEART RATE: 71 BPM | HEIGHT: 65 IN | RESPIRATION RATE: 12 BRPM | WEIGHT: 107 LBS | SYSTOLIC BLOOD PRESSURE: 108 MMHG | BODY MASS INDEX: 17.83 KG/M2 | DIASTOLIC BLOOD PRESSURE: 68 MMHG | OXYGEN SATURATION: 98 %

## 2019-08-01 DIAGNOSIS — Z01.818 PREOP EXAMINATION: Primary | ICD-10-CM

## 2019-08-01 DIAGNOSIS — R91.1 LESION OF RIGHT LUNG: ICD-10-CM

## 2019-08-01 DIAGNOSIS — D49.9 NEOPLASM: ICD-10-CM

## 2019-08-01 DIAGNOSIS — Z01.818 PREOP EXAMINATION: ICD-10-CM

## 2019-08-01 DIAGNOSIS — R91.1 LESION OF RIGHT LUNG: Primary | ICD-10-CM

## 2019-08-01 LAB
ANION GAP SERPL CALCULATED.3IONS-SCNC: 1 MMOL/L (ref 3–14)
BUN SERPL-MCNC: 8 MG/DL (ref 7–30)
CALCIUM SERPL-MCNC: 8.9 MG/DL (ref 8.5–10.1)
CHLORIDE SERPL-SCNC: 103 MMOL/L (ref 94–109)
CO2 SERPL-SCNC: 32 MMOL/L (ref 20–32)
CREAT SERPL-MCNC: 0.67 MG/DL (ref 0.52–1.04)
ERYTHROCYTE [DISTWIDTH] IN BLOOD BY AUTOMATED COUNT: 12.8 % (ref 10–15)
GFR SERPL CREATININE-BSD FRML MDRD: >90 ML/MIN/{1.73_M2}
GLUCOSE SERPL-MCNC: 80 MG/DL (ref 70–99)
HCT VFR BLD AUTO: 45.3 % (ref 35–47)
HGB BLD-MCNC: 14.8 G/DL (ref 11.7–15.7)
INR PPP: 0.96 (ref 0.86–1.14)
MCH RBC QN AUTO: 34.3 PG (ref 26.5–33)
MCHC RBC AUTO-ENTMCNC: 32.7 G/DL (ref 31.5–36.5)
MCV RBC AUTO: 105 FL (ref 78–100)
PLATELET # BLD AUTO: 249 10E9/L (ref 150–450)
POTASSIUM SERPL-SCNC: 4.3 MMOL/L (ref 3.4–5.3)
RBC # BLD AUTO: 4.31 10E12/L (ref 3.8–5.2)
SODIUM SERPL-SCNC: 136 MMOL/L (ref 133–144)
WBC # BLD AUTO: 11.3 10E9/L (ref 4–11)

## 2019-08-01 RX ORDER — GABAPENTIN 300 MG/1
300 CAPSULE ORAL ONCE
Status: CANCELLED | OUTPATIENT
Start: 2019-08-01 | End: 2019-08-01

## 2019-08-01 RX ORDER — CHLORHEXIDINE GLUCONATE ORAL RINSE 1.2 MG/ML
15 SOLUTION DENTAL ONCE
Status: CANCELLED | OUTPATIENT
Start: 2019-08-01 | End: 2019-08-01

## 2019-08-01 RX ORDER — ACETAMINOPHEN 325 MG/1
975 TABLET ORAL ONCE
Status: CANCELLED | OUTPATIENT
Start: 2019-08-01 | End: 2019-08-01

## 2019-08-01 RX ORDER — CELECOXIB 200 MG/1
200 CAPSULE ORAL ONCE
Status: CANCELLED | OUTPATIENT
Start: 2019-08-01 | End: 2019-08-01

## 2019-08-01 ASSESSMENT — ENCOUNTER SYMPTOMS
DEPRESSION: 1
INCREASED ENERGY: 1
SYNCOPE: 0
POLYPHAGIA: 0
SNORES LOUDLY: 0
MUSCLE CRAMPS: 1
HYPERTENSION: 0
COUGH: 0
MUSCLE WEAKNESS: 1
POOR WOUND HEALING: 0
DECREASED CONCENTRATION: 0
LOSS OF CONSCIOUSNESS: 0
DYSPNEA ON EXERTION: 1
ARTHRALGIAS: 1
DECREASED APPETITE: 1
HALLUCINATIONS: 0
MEMORY LOSS: 0
LIGHT-HEADEDNESS: 0
ALTERED TEMPERATURE REGULATION: 1
BACK PAIN: 1
SINUS CONGESTION: 0
SPUTUM PRODUCTION: 0
SORE THROAT: 0
FATIGUE: 1
POSTURAL DYSPNEA: 0
SKIN CHANGES: 1
POLYDIPSIA: 1
SLEEP DISTURBANCES DUE TO BREATHING: 0
LEG PAIN: 1
TROUBLE SWALLOWING: 0
MYALGIAS: 1
TINGLING: 1
NECK PAIN: 1
TASTE DISTURBANCE: 0
HEADACHES: 0
FEVER: 0
SMELL DISTURBANCE: 0
NECK MASS: 0
WEIGHT LOSS: 1
JOINT SWELLING: 1
SINUS PAIN: 0
NERVOUS/ANXIOUS: 1
WEAKNESS: 1
PARALYSIS: 0
HOARSE VOICE: 0
SEIZURES: 0
NAIL CHANGES: 0
SHORTNESS OF BREATH: 1
EXERCISE INTOLERANCE: 1
INSOMNIA: 0
WHEEZING: 0
PANIC: 0
WEIGHT GAIN: 0
TREMORS: 0
NUMBNESS: 1
HEMOPTYSIS: 0
SPEECH CHANGE: 0
STIFFNESS: 1
ORTHOPNEA: 0
HYPOTENSION: 0
BRUISES/BLEEDS EASILY: 1
NIGHT SWEATS: 1
DIZZINESS: 1
PALPITATIONS: 0
CHILLS: 1
SWOLLEN GLANDS: 0
DISTURBANCES IN COORDINATION: 1
COUGH DISTURBING SLEEP: 0

## 2019-08-01 ASSESSMENT — LIFESTYLE VARIABLES: TOBACCO_USE: 1

## 2019-08-01 ASSESSMENT — MIFFLIN-ST. JEOR: SCORE: 1076.23

## 2019-08-01 ASSESSMENT — PAIN SCALES - GENERAL: PAINLEVEL: WORST PAIN (10)

## 2019-08-01 ASSESSMENT — PATIENT HEALTH QUESTIONNAIRE - PHQ9: SUM OF ALL RESPONSES TO PHQ QUESTIONS 1-9: 7

## 2019-08-01 NOTE — PATIENT INSTRUCTIONS
You are scheduled for your biopsy on Monday, August 12, 2019  Report to the [facility] Waiting room at 9:30 AM  The [facility] Waiting Room is located on the 2nd floor (street level) of the [facility], [address] [place] [address].  Your procedure is scheduled to start at approximately 11:00 AM    No solid foods or milk products for 6 hours prior on the day of the procedure 5:00 AM  You may have clear liquids until 2 hours prior on the day of the procedure.(water, apple juice, broth, coffee or tea without milk or sugar, jell-o, white grape juice)  9:00 AM    You may take your morning medications    You will need a     If you have any questions you may call the Radiology Nurse Line [phone]

## 2019-08-01 NOTE — H&P
Pre-Operative H & P     CC:  Preoperative exam to assess for increased cardiopulmonary risk while undergoing surgery and anesthesia.    Date of Encounter: 8/1/2019  Primary Care Physician:  Aggie Zavala  Associated Diagnosis: Right Upper Lobe lung nodule    HPI  Julia Boudreaux is a 54 year old female who presents for pre-operative H & P in preparation for right VATS, Wedge resection, possible lobectomy, possible TEMLA with Dr. Roman on TBD at [facility].     This is a 54 year old female with history of 41 years of smoking, GERD, degenerative disc disease lumbar, stress incontinence, and depression.  Pt uses a wheelchair or cane most of the time due to her back pain. She is unable to walk more than one block. She presented to her PCP in 5/2019 complaining of significant weight loss over the last year (30 lbs).  She reports dry cough in the morning and night sweats but no fevers.  Subsequent CT scan revealed RUL nodule.  She was referred to pulmonary then throacic surgery for further treatment and evaluation.  Dr. Roman recommended the above procedure.  Pt is scheduled for consult in IR for possible biopsy.     History is obtained from the patient.     Past Medical History  Past Medical History:   Diagnosis Date     Abnormal Papanicolaou smear of cervix and cervical HPV     Abn. 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    Hospitalized       Past Surgical History  Past Surgical History:   Procedure Laterality Date     C COMBINED ANT/POST COLPORRHAPHY  02/22/13     C LAMINOTOMY,LUMBAR DISK,1 INTRSP  03/28/08     C LIGATE FALLOPIAN TUBE       HAND SURGERY  2016     HYSTERECTOMY, ANDRES  2002     bleeding     rw back  11-1-12;11-15-12    2 nerves cut and cauterized in low back       Hx of Blood transfusions/reactions: none     Hx of abnormal bleeding or anti-platelet use: none    Menstrual history: No LMP recorded. Patient has had a hysterectomy.:     Steroid use in the last year: none    Personal or FH with difficulty with Anesthesia:  none    Prior to Admission Medications  Current Outpatient Medications   Medication Sig Dispense Refill     baclofen (LIORESAL) 10 MG tablet Take 10 mg by mouth 2 times daily       diazepam (VALIUM) 5 MG tablet Take 15 mg by mouth daily        DULoxetine (CYMBALTA) 60 MG capsule Take 60 mg by mouth every morning        GABAPENTIN 1,200 mg 3 times daily.       HEMP OIL OR EXTRACT OR OTHER CBD CANNABINOID, NOT MEDICAL CANNABIS, Inhale 1 ampule into the lungs daily        meloxicam (MOBIC) 7.5 MG tablet Take 1 tablet by mouth 2 times daily        omeprazole 20 MG tablet Take 20 mg by mouth every morning Take 30-60 minutes before a meal. 30 tablet 1     ondansetron (ZOFRAN ODT) 4 MG ODT tab Take 4 mg by mouth as needed       oxyCODONE (ROXICODONE) 5 MG tablet Take 10 mg by mouth 2 times daily          Allergies  Allergies   Allergen Reactions     Latex Rash     Liquid Adhesive Rash       Social History  Social History     Socioeconomic History     Marital status:      Spouse name: Octavio     Number of children: 4     Years of education: Not on file     Highest education level: Not on file   Occupational History     Employer: HOMEMAKER   Social Needs     Financial resource strain: Not on file     Food insecurity:     Worry: Not on file     Inability: Not on file     Transportation needs:     Medical: Not on file     Non-medical: Not on file   Tobacco Use     Smoking status: Current Every Day Smoker     Packs/day: 1.00     Years: 40.00     Pack years: 40.00     Smokeless tobacco: Never Used     Tobacco comment: declines quit line 2-12-13   Substance and Sexual Activity      Alcohol use: Yes     Comment: rare     Drug use: No     Sexual activity: Yes     Partners: Male     Birth control/protection: Surgical   Lifestyle     Physical activity:     Days per week: Not on file     Minutes per session: Not on file     Stress: Not on file   Relationships     Social connections:     Talks on phone: Not on file     Gets together: Not on file     Attends Temple service: Not on file     Active member of club or organization: Not on file     Attends meetings of clubs or organizations: Not on file     Relationship status: Not on file     Intimate partner violence:     Fear of current or ex partner: Not on file     Emotionally abused: Not on file     Physically abused: Not on file     Forced sexual activity: Not on file   Other Topics Concern      Service Not Asked     Blood Transfusions No     Caffeine Concern Yes     Occupational Exposure Not Asked     Hobby Hazards Not Asked     Sleep Concern No     Stress Concern No     Weight Concern Yes     Special Diet No     Back Care Yes     Exercise Yes     Bike Helmet Not Asked     Seat Belt Yes     Self-Exams Yes     Parent/sibling w/ CABG, MI or angioplasty before 65F 55M? Not Asked   Social History Narrative    Food manufacturing and dietary work, variety of manual labor, unloading        Family History  Family History   Problem Relation Age of Onset     Diabetes Father         ETOH abuse     Cardiovascular Maternal Grandmother         stroke     Alzheimer Disease No family hx of      Cancer No family hx of         breast, colon, uterine, ovarian     Heart Disease No family hx of      Thyroid Disease No family hx of      Anesthesia Reaction No family hx of        ROS/MED HX  The complete review of systems is negative other than noted in the HPI or here.  ENT/Pulmonary:     (+)tobacco use, Current use , . .   (-) recent URI   Neurologic:  - neg neurologic ROS     Cardiovascular: Comment: History of hyperlipidemia, no longer on lipitor    (+)  "Dyslipidemia, ----. : . . . :. . Previous cardiac testing date:results:Stress Testdate:5-10 years ago, no records results: date: results: date: results:          METS/Exercise Tolerance:  1 - Eating, dressing   Hematologic:         Musculoskeletal: Comment: Lumbar degenerative disc disease  (+) fracture upper extremity: Radius,  -       GI/Hepatic:     (+) GERD Asymptomatic on medication,       Renal/Genitourinary:     (+) Nephrolithiasis , Other Renal/ Genitourinary, stress incontinence, cystocele      Endo:  - neg endo ROS       Psychiatric:     (+) psychiatric history depression      Infectious Disease:  - neg infectious disease ROS       Malignancy:   (+) Malignancy History of Lung  Lung CA Active status post.         Other:    (+) H/O Chronic Pain,H/O chronic opiod use ,           Temp: 97.6  F (36.4  C) Temp src: Oral BP: 108/68 Pulse: 71   Resp: 12 SpO2: 98 %         107 lbs 0 oz  5' 5\"   Body mass index is 17.81 kg/m .       Physical Exam  Constitutional: Awake, alert, cooperative, no apparent distress, and appears stated age.  Frail appearing, in a wheelchair.  Eyes: Pupils equal, round and reactive to light, extra ocular muscles intact, sclera clear, conjunctiva normal.  HENT: Normocephalic, oral pharynx with moist mucus membranes, dentures. No goiter appreciated.   Respiratory: Clear to auscultation bilaterally, no crackles or wheezing, coarse breath sounds at the end of inspiration.  Cardiovascular: Regular rate and rhythm, normal S1 and S2, and no murmur noted.  Carotids +2, no bruits. No edema. Palpable pulses to radial  DP and PT arteries.   GI: Normal bowel sounds, soft, non-distended, non-tender, no masses palpated,  Lymph/Hematologic: No cervical lymphadenopathy and no supraclavicular lymphadenopathy.  Genitourinary:  deferred  Skin: Warm and dry.    Musculoskeletal: Full ROM of neck. There is no redness, warmth, or swelling of the joints. Gross motor strength is normal.    Neurologic: Awake, " alert, oriented to name, place and time. Cranial nerves II-XII are grossly intact.   Neuropsychiatric: Calm, cooperative. Normal affect.     Labs: (personally reviewed)  Lab Results   Component Value Date    WBC 11.3 08/01/2019     Lab Results   Component Value Date    RBC 4.31 08/01/2019     Lab Results   Component Value Date    HGB 14.8 08/01/2019     Lab Results   Component Value Date    HCT 45.3 08/01/2019     Lab Results   Component Value Date     08/01/2019     Lab Results   Component Value Date    MCH 34.3 08/01/2019     Lab Results   Component Value Date    MCHC 32.7 08/01/2019     Lab Results   Component Value Date    RDW 12.8 08/01/2019     Lab Results   Component Value Date     08/01/2019     Last Comprehensive Metabolic Panel:  Sodium   Date Value Ref Range Status   08/01/2019 136 133 - 144 mmol/L Final     Potassium   Date Value Ref Range Status   08/01/2019 4.3 3.4 - 5.3 mmol/L Final     Chloride   Date Value Ref Range Status   08/01/2019 103 94 - 109 mmol/L Final     Carbon Dioxide   Date Value Ref Range Status   08/01/2019 32 20 - 32 mmol/L Final     Anion Gap   Date Value Ref Range Status   08/01/2019 1 (L) 3 - 14 mmol/L Final     Glucose   Date Value Ref Range Status   08/01/2019 80 70 - 99 mg/dL Final     Urea Nitrogen   Date Value Ref Range Status   08/01/2019 8 7 - 30 mg/dL Final     Creatinine   Date Value Ref Range Status   08/01/2019 0.67 0.52 - 1.04 mg/dL Final     GFR Estimate   Date Value Ref Range Status   08/01/2019 >90 >60 mL/min/[1.73_m2] Final     Comment:     Non  GFR Calc  Starting 12/18/2018, serum creatinine based estimated GFR (eGFR) will be   calculated using the Chronic Kidney Disease Epidemiology Collaboration   (CKD-EPI) equation.       Calcium   Date Value Ref Range Status   08/01/2019 8.9 8.5 - 10.1 mg/dL Final       EKG: not indicated    Combined Report of:    PET and CT on  7/18/2019 2:18 PM :     1. PET of the neck, chest, abdomen, and  pelvis.  2. PET CT Fusion for Attenuation Correction and Anatomical  Localization:    3. Diagnostic CT scan of the chest, abdomen, and pelvis with  intravenous contrast for interpretation.  3. CT of the chest, abdomen and pelvis obtained for diagnostic  interpretation.  4. 3D MIP and PET-CT fused images were processed on an independent  workstation and archived to PACS and reviewed by a radiologist.     Technique:     1. PET: The patient received 10.2 mCi of F-18-FDG; the serum glucose  was 108 prior to administration, body weight was 44.5 kg. Images were  evaluated in the axial, sagittal, and coronal planes as well as the  rotational whole body MIP. Images were acquired from the Vertex to the  Feet.     UPTAKE WAS MEASURED AT 68 MINUTES.      BACKGROUND:  Liver SUV max= 2.6,   Aorta Blood SUV Max: 1.7.      2. CT: Volumetric acquisition for clinical interpretation of the  chest, abdomen, and pelvis acquired at 3 mm sections  after the  uneventful administration of intravenous contrast. The chest, abdomen,  and pelvis were evaluated at 5 mm sections in bone, soft tissue, and  lung windows.       The patient received 59 cc. Of Isovue 370 intravenously for the  examination.    --     3. 3D MIP and PET-CT fused images were processed on an independent  workstation and archived to PACS and reviewed by a radiologist.     INDICATION: evaluate lung nodule. Patient also has unexplained weight  loss; Lung nodule     ADDITIONAL INFORMATION OBTAINED FROM EMR: Patient with heavy smoking  history and emphysema with recently noted lung nodule in the right  upper lobe seen on CT from 5/20/2019. Patient with weight loss.  Concern for cancer.     COMPARISON: Outside CT 5/20/2019.     FINDINGS:      HEAD/NECK:  There is no  suspicious FDG uptake in the neck.      The paranasal sinuses and mastoid air cells are clear.      The mucosal pharyngeal space, the , prevertebral and carotid  spaces are within normal limits. No  masses, mass effect or  pathologically enlarged lymph nodes are evident. Non-FDG avid,  multinodular thyroid with multifocal hypodensities, predominantly  involving the left hemithyroid lobe, with associated coarse  calcification. These are better assessed on ultrasound 5/28/2019.     CHEST:  Hypermetabolic irregular-shaped right upper lobe nodule with internal  cavitation and abuts the pleura measures 2.3 x 0.6 cm (series 8 image  36) Max SUV 9.8, TLG 10.1, and MTV 1.8. Nodule is grossly stable in  size from 5/20/2019.     Additional non-FDG avid lung nodules include a 4 mm solid nodule left  apex (series 8 image 25), stable. No additional suspicious pulmonary  nodules.     Central airways are clear. No focal consolidation, pleural effusion,  or pneumothorax. Mild upper lobe paraseptal emphysema. Minimal  dependent atelectasis the lung bases. Heart size is normal without  pericardial effusion. Ascending aorta and main pulmonary trunk are  normal caliber. No central PE. Minimal calcifications of the LAD  coronary artery, aortic root, aortic arch, proximal great vessels.  Mildly prominent 1.0 cm right hilar node without FDG uptake. No other  enlarged thoracic lymph nodes.     ABDOMEN AND PELVIS:  There is no suspicious FDG uptake in the abdomen or pelvis.     Cholecystectomy with stable mild intrahepatic and extrahepatic biliary  ductal dilatation. Normal appearance of the liver, pancreas, adrenal  glands, and spleen. Tiny splenule along the inferior splenic pole.  Subcentimeter renal hypodensities, too small characterize and probably  cysts. Otherwise the kidneys are unremarkable. The bladder is  decompressed. Hysterectomy. The bowel is nonobstructed. Appendix is  noninflamed. Diffuse stool throughout the colon. Scattered  diverticulosis without diverticulitis. No free air or free fluid.  Abdominal aorta is normal caliber with mild to moderate discogenic  disease.     LOWER EXTREMITIES:   No abnormal masses or  hypermetabolic lesions.     BONES:   There are no suspicious lytic or blastic osseous lesions.  There is no  abnormal FDG uptake in the skeleton. Mild multilevel degenerative  changes of the spine.                                                                     IMPRESSION:   1. 2.3 cm hypermetabolic right upper lobe nodule with areas of central  cavitation. This is lung carcinoma until proven otherwise. We are  unable to differentiate between small cell and non-small cell  carcinoma nodules. Recommend tissue sampling. No evidence of disease  outside the lungs.  2. Additional smaller pulmonary nodule as described above.  3. Mild upper lobe predominant emphysema.  4. Multinodular thyroid.  5. Cholecystectomy and hysterectomy.      PFT 7/25/2019     FEV1-Pre 2.43    L  07/25/2019  2:04       FEV1-%Pred-Pre 90               INTERPRETATION:      1.  Mild obstruction   2.  Air trapping   3.  Hyperinflation   4.  Normal gas transfer     Outside records reviewed from: care everywhere    ASSESSMENT and PLAN  Julia Boudreaux is a 54 year old female scheduled for Right VATS, wedge resection, lobectomy, possible TEMLA on TBD by Dr. Roman in treatment of RUL nodule.  PAC referral for risk assessment and optimization for anesthesia with comorbid conditions of smoking, GERD, depression, lumbar degenerative disc disease:    Pre-operative considerations:  1.  Cardiac:  Functional status- METS 1.  High risk surgery with 0.4% (RCRI #) risk of major adverse cardiac event.   2.  Pulm:  Airway feasible.  YRN risk: low  3.  GI:  Risk of PONV score = 2.  If > 2, anti-emetic intervention recommended.  4.  Pt currently taking 60 MME oxycodone every day, 15mg valium every day.  Please see Pharmacy recs.    VTE risk: 1.8% (cancer)     #cardiac  -denies CP, SOB, palpitations.  Pt endorses feeling SOB with activity, this is not new for her over the last year.  -no cardiac history  -pt states she had treadmill stress test 5-10 years  ago, no record found in chart.  Per patient, it was negative.    #pulmonary  -current every day smoker.  41 year history, 1ppd.    -PFTs 7/25/2019.  FEV1 2.43L, 90% of predicted.  Mild obstruction, air trapping, hyperinflation, normal gas transfer.    #musculoskeletal  -lumbar degenerative disc disease.  Two prior back surgeries.  Chronic low back pain.  Caution careful positioning for surgery.  -ambulates with cane at home.    -deconditioned  -will hold meloxicam 10 days prior to surgery    #psych  -depression on duloxetine        Patient is optimized and is acceptable candidate for the proposed procedure.  No further diagnostic evaluation is needed.       Patient was discussed with Dr Guerra.    JAREN GreenC  Preoperative Assessment Center  [facility]  Phone: [phone]  Fax: [phone]

## 2019-08-01 NOTE — PATIENT INSTRUCTIONS
Preparing for Your Surgery      Name:  Julia Boudreaux   MRN:  [id number]   :  1965   Today's Date:  2019     Arriving for surgery:  Surgery date:  To be determined.  You will receive a phone call from the pre-admission office in 1 to 2 business days with a surgery date, arrival time and location.    The pre-admission office phone number is:  [phone].  They are open Monday through Friday, 8:00 am to 5:30 pm.        What can I eat or drink?  -  You may have solid food or milk products until 8 hours prior to your surgery.  -  You may have water, apple juice or 7up/Sprite until 2 hours prior to your surgery.    Which medicines can I take?  Stop Aspirin, vitamins and supplements one week prior to surgery.  Hold Ibuprofen for 24 hours and/or Naproxen for 48 hours prior to surgery.  Hold medical marijuana/hemp oil 24 hours prior to surgery.  Hold Meloxicam (Mobic) 10 days prior to surgery.     -  Please take these medications the day of surgery:  Baclofen (Lioresal)    Gabapentin  Diazepam (Valium)    Hydrocodone-Acetaminophen (Vicodni)  Duloxetine (Cymbalta)   Ondansetron (Zofran)  Omeprazole      Oxycodone (Roxicodone)        How do I prepare myself?  -  Take two showers: one the night before surgery; and one the morning of surgery.         Use Scrubcare or Hibiclens to wash from neck down.  You may use your own shampoo and conditioner. No other hair products.   -  Do NOT use lotion, powder, deodorant, or antiperspirant the day of your surgery.  -  Do NOT wear any makeup, fingernail polish or jewelry.  -  Do not bring your own medications to the hospital.  -  Bring your ID and insurance card.    -If you are scheduled to go home the Same Day as surgery you must have a responsible adult as a  and to stay with you overnight the first 24 hours after surgery.     Questions or Concerns:  -If you have questions or concerns regarding the day of surgery, please call [phone].     -For questions after  surgery please call your surgeons office.     Enhanced Recovery After Surgery     This is a team effort, including you, to get you back on your feet, eating and drinking normally and out of the hospital as quickly as possible.  The goals are:          1) NO INFECTIONS and   2) RETURN TO NORMAL DIET    How can we achieve these goals?  1) STAY ACTIVE: Walk every day before your surgery; try to increase the amount every day.  Walk after surgery as much as you can-the nurses will help you.  Walking speeds healing and gets you home quicker, you heal better at home and have less risk of infection.     2) INCENTIVE SPIROMETER: Practice your incentive spirometer 4 times per day with 5 repetitions each time.  Using the incentive spirometer can strengthen your muscles between your ribs and help you have a strong cough after surgery.  A more effective cough can help prevent problems with your lungs.    3) STAY HYDRATED: Drink clear liquids up until 2 hours before your surgery. We would like you to purchase a drink such as Gatorade or Ensure Clear (not the milkshake type).  Drink this before bedtime and on the way into the hospital, drink between 8-10 ounces or until you feel hydrated.  Keeping well hydrated leads to your veins being plump, you wake up faster, and you are less likely to be nauseated. Start drinking water as soon as you can after surgery and advance to clear liquids and food as tolerated.  IV fluids contain salt, drinking fluids will minimize the amount of IV fluids you need and decrease the amount of salt you get.    The most common reason for the patient to be readmitted is dehydration. Staying hydrated after you go home from the hospital is very important.  Ensure or Ensure Clear are good options to keep you hydrated.     4) PAIN MANAGEMENT: If we minimize the amount of opioids and narcotics, and use regional blocks (which numb the area where your surgery is) along with oral pain medications; you will have  less side effects of nausea and constipation. Narcotics can slow down your bowels and cause you to stay in the hospital longer.     Our goal is to keep you comfortable; eating and drinking normally and back home safely.     Using an Incentive Spirometer    An incentive spirometer is a device that helps you do deep breathing exercises. These exercises expand your lungs, aid in circulation, and help prevent pneumonia. Deep breathing exercises also help you breathe better and improve the function of your lungs by:    Keeping your lungs clear    Strengthening your breathing muscles    Helping prevent respiratory complications or problems  The incentive spirometer gives you a way to take an active part in your care. A nurse or therapist will teach you breathing exercises. To do these exercises, you will breathe in through your mouth and not your nose. The incentive spirometer only works correctly if you breathe in through your mouth.    Steps to clear lungs  Step 1. Exhale normally. Then, inhale normally.    Relax and breathe out.  Step 2. Place your lips tightly around the mouthpiece.    Make sure the device is upright and not tilted.  Step 3. Inhale as much air as you can through the mouthpiece (don't breath through your nose).    Inhale slowly and deeply.    Hold your breath long enough to keep the balls or disk raised for at least 3 to 5 seconds, or as instructed by your healthcare provider.  Step 4. Repeat the exercise regularly.  Begin using the Incentive Spirometer one week prior to your surgery, 4 times per day-5 times each.      AFTER YOUR SURGERY  Breathing exercises   Breathing exercises help you recover faster. Take deep breaths and let the air out slowly. This will:     Help you wake up after surgery.    Help prevent complications like pneumonia.  Preventing complications will help you go home sooner.   We may give you a breathing device (incentive spirometer) to encourage you to breathe deeply.   Nausea and  vomiting   You may feel sick to your stomach after surgery; if so, let your nurse know.    Pain control:  After surgery, you may have pain. Our goal is to help you manage your pain. Pain medicine will help you feel comfortable enough to do activities that will help you heal.  These activities may include breathing exercises, walking and physical therapy.   To help your health care team treat your pain we will ask: 1) If you have pain  2) where it is located 3) describe your pain in your words  Methods of pain control include medications given by mouth, vein or by nerve block for some surgeries.  Sequential Compression Device (SCD) or Pneumo Boots:  You may need to wear SCD S on your legs or feet. These are wraps connected to a machine that pumps in air and releases it. The repeated pumping helps prevent blood clots from forming.

## 2019-08-01 NOTE — ANESTHESIA PREPROCEDURE EVALUATION
Anesthesia Pre-Procedure Evaluation    Patient: Julia Boudreaux   MRN:     [id number] Gender:   female   Age:    54 year old :      1965        Preoperative Diagnosis: * No surgery found *        Past Medical History:   Diagnosis Date     Abnormal Papanicolaou smear of cervix and cervical HPV     Abn. Pap smear (cervix)     Calculus of kidney      Displacement of lumbar intervertebral disc without myelopathy 3/29/08    Hospitalized     Endometriosis, site unspecified     Endometriosis     Major depressive disorder, single episode, moderate (H)      Prolapse of vaginal vault after hysterectomy 13    Hospitalized      Past Surgical History:   Procedure Laterality Date     C COMBINED ANT/POST COLPORRHAPHY  13     C LAMINOTOMY,LUMBAR DISK,1 INTRSP  08     C LIGATE FALLOPIAN TUBE       HYSTERECTOMY, ANDRES      bleeding     rw back  12;11-15-12    2 nerves cut and cauterized in low back          Anesthesia Evaluation     . Pt has had prior anesthetic. Type: General    No history of anesthetic complications          ROS/MED HX    ENT/Pulmonary:     (+)tobacco use, Current use , . .   (-) recent URI   Neurologic:  - neg neurologic ROS     Cardiovascular: Comment: History of hyperlipidemia, no longer on lipitor    (+) Dyslipidemia, ----. : . . . :. . Previous cardiac testing date:results:Stress Testdate:5-10 years ago, no records results: date: results: date: results:          METS/Exercise Tolerance:  1 - Eating, dressing   Hematologic:         Musculoskeletal: Comment: Lumbar degenerative disc disease  (+) fracture upper extremity: Radius,  -       GI/Hepatic:     (+) GERD Asymptomatic on medication,       Renal/Genitourinary:     (+) Nephrolithiasis , Other Renal/ Genitourinary, stress incontinence, cystocele      Endo:  - neg endo ROS       Psychiatric:     (+) psychiatric history depression      Infectious Disease:  - neg infectious disease ROS       Malignancy:   (+) Malignancy History of  "Lung  Lung CA Active status post.         Other:    (+) H/O Chronic Pain,H/O chronic opiod use ,                        PHYSICAL EXAM:   Mental Status/Neuro: A/A/O; Age Appropriate   Airway: Facies: Feasible  Mallampati: II  Mouth/Opening: Full  TM distance: > 6 cm  Neck ROM: Full   Respiratory: Respiratory Auscultation: coarse breath sounds at end of inspiratory phase.     Resp. Rate: Normal     Resp. Effort: Normal      CV: Rhythm: Regular  Rate: Age appropriate  Heart: Normal Sounds  Edema: None   Comments:      Dental: Dentures  Dentures: Complete                LABS:  CBC:   Lab Results   Component Value Date    WBC 9.3 02/12/2013    WBC 11.5 (A) 08/01/2002    HGB 15.3 07/25/2019    HGB 14.3 02/12/2013    HCT 43.1 02/12/2013    HCT 45.0 08/01/2002     02/12/2013     08/01/2002     BMP:   Lab Results   Component Value Date     02/12/2013    POTASSIUM 4.2 02/12/2013    CHLORIDE 107 02/12/2013    CO2 28 02/12/2013    BUN 15 02/12/2013    CR 0.71 02/12/2013    GLC 77 02/12/2013    GLC 88 01/14/2013     COAGS: No results found for: PTT, INR, FIBR  POC:   Lab Results   Component Value Date     (H) 07/18/2019     OTHER:   Lab Results   Component Value Date    PH 6.5 08/01/2002    LARRY 9.6 02/12/2013        Preop Vitals    BP Readings from Last 3 Encounters:   07/25/19 105/67   07/10/19 98/63   04/08/13 104/60    Pulse Readings from Last 3 Encounters:   07/25/19 62   07/10/19 86   04/08/13 80      Resp Readings from Last 3 Encounters:   07/25/19 16   07/10/19 18    SpO2 Readings from Last 3 Encounters:   07/25/19 100%   07/10/19 93%      Temp Readings from Last 1 Encounters:   07/25/19 96.9  F (36.1  C) (Tympanic)    Ht Readings from Last 1 Encounters:   07/25/19 1.651 m (5' 5\")      Wt Readings from Last 1 Encounters:   07/25/19 48.1 kg (106 lb)    Estimated body mass index is 17.64 kg/m  as calculated from the following:    Height as of 7/25/19: 1.651 m (5' 5\").    Weight as of 7/25/19: 48.1 " kg (106 lb).     LDA:        JZG FV AN PLAN NO PONV RULE       PAC Discussion and Assessment    ASA Classification: 2  Case is suitable for: [place]  Anesthetic techniques and relevant risks discussed: PAC Recommendations anesthetic techniques: tbd.  Invasive monitoring and risk discussed: No  Types:   Possibility and Risk of blood transfusion discussed: Yes  NPO instructions given:   Additional anesthetic preparation and risks discussed:   Needs early admission to pre-op area:   Other:     PAC Resident/NP Anesthesia Assessment:  Julia Boudreaux is a 54 year old female scheduled for Right VATS, wedge resection, lobectomy, possible TEMLA on TBD by Dr. Roman in treatment of RUL nodule.  PAC referral for risk assessment and optimization for anesthesia with comorbid conditions of smoking, GERD, depression, lumbar degenerative disc disease:    Pre-operative considerations:  1.  Cardiac:  Functional status- METS 1. Pt cannot walk one block even.  High risk surgery with 0.4% (RCRI #) risk of major adverse cardiac event.   2.  Pulm:  Airway feasible.  YRN risk: low  3.  GI:  Risk of PONV score = 2.  If > 2, anti-emetic intervention recommended.  4.  Pt currently taking 60 MME oxycodone every day, 15mg valium every day.  Please see Pharmacy recs.    VTE risk: 1.8% (cancer)     #cardiac  -denies CP, SOB, palpitations.  Pt endorses feeling SOB with activity, this is not new for her over the last year.  -no cardiac history  -pt states she had treadmill stress test 5-10 years ago, no record found in chart.  Per patient, it was negative.    #pulmonary  -current every day smoker.  41 year history, 1ppd.    -PFTs 7/25/2019.  FEV1 2.43L, 90% of predicted.  Mild obstruction, air trapping, hyperinflation, normal gas transfer.    #musculoskeletal  -lumbar degenerative disc disease.  Two prior back surgeries.  Chronic low back pain. Caution careful positioning for surgery.   -ambulates with cane at home.    -deconditioned  -will hold  meloxicam 10 days prior to surgery    #psych  -depression on duloxetine    Patient is optimized and is acceptable candidate for the proposed procedure.  No further diagnostic evaluation is needed.     Patient discussed with Dr. Guerra.     **For further details of assessment, testing, and physical exam please see H and P completed on same date.          Sherri Pavon PA-C, [facility]      Reviewed and Signed by PAC Mid-Level Provider/Resident  Mid-Level Provider/Resident: Sherri Pavon  Date: 8/1/2019  Time:     Attending Anesthesiologist Anesthesia Assessment:  Frail 53 yo female for R VATs proceed as indicated. In wheel chair, walk very little  Airway: opens well ,upper and lower denture plates, extension ok, limited by pain, tingling in  hands:     Will try to decrease oxycodone use presurgically.  Plan GETA                                                                                                                                                                                                                                         Anesthesiologist:   Date:   Time:   Pass/Fail:   Disposition:     PAC Pharmacist Assessment:        Pharmacist:   Date:   Time:    Sherri Pavon PA-C

## 2019-08-01 NOTE — LETTER
8/1/2019       RE: Julia Boudreaux  [id number] [facility] [address]     Dear Colleague,    Thank you for referring your patient, Julia Boudreaux, to the [facility] INTERVENTIONAL RADIOLOGY at [facility]. Please see a copy of my visit note below.    First Name: Julia   Age: 54 year old   Referring Physician: Dr. Soto   REASON FOR REFERRAL: Consult for lung lesion biopsy    Assessment:  Julia is a 55 yo with an 85 pound weight loss since 2013 that was unintentional.  She has a 41 pack yr hx of smoking and continues to smoking.  Chronic back pain, s/p many back surgeries, uses a cane and wheelchair.  She had a CT chest evaluating her unexplained weight loss and found the right upper lobe lung lesion that is PET avid and concerning for malignancy, biopsy is requested.    Plan:  Image guided biopsy of right upper lobe lung lesion  Please work with patient to make her most comfortable for the biopsy.  She will not be able to lay prone, but she can lay with her left side down most easily  She is working on smoking cessation, for a surgical procedure in the future.    HPI: This is a patient who from [id number] lost 35 pounds and then from 2016- 2017 she lost 25 pounds and then from December 2017 to present she lost another 25 pounds.  She did not do this intentionally and her PCP has been desperately trying to figure why it is happening.   .She has debilitating back problems and has had multiple surgeries and uses a wheelchair to get around here at the clinics, she also has a cane that she uses.  In 2010 she had normal bone density.  She has a 41 pack yr hx of smoking and continues to smoke.  Her PCP performed a CT of the chest on 5/20/19 and found that she had a right upper lobe lung lesion.  She was able to have a PET CT scan performed 7/18/2019.  She has a 2.3 cm irregularly shaped lesion with internal cavitation and it abuts the pleural.  SUV max is 9.8.  The case was  discussed at lung nodule conference, Dr. Thomas was present from Interventional Radiology, the patient was approved for percutaneous biopsy.      series 8, image 33    PAST MEDICAL HISTORY:   Past Medical History:   Diagnosis Date     'light-for-dates' infant with signs of fetal malnutrition      Abdominal aortic aneurysm (H)      Abnormal Papanicolaou smear of cervix and cervical HPV     Abn. 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 2/23/13    Hospitalized     PAST SURGICAL HISTORY:   Past Surgical History:   Procedure Laterality Date     BLADDER SURGERY  2010     C COMBINED ANT/POST COLPORRHAPHY  02/22/13     C LIGATE FALLOPIAN TUBE       CHOLECYSTECTOMY, LAPOROSCOPIC  08/27/2014     chondrectomy of the spine  03/01/2008    discectomy     HAND SURGERY  2016     laminotomy for decompression and exploration  03/22/2016     rw back  11-1-12;11-15-12    2 nerves cut and cauterized in low back     ANDRES and BSO  2002    bleeding     FAMILY HISTORY:   Family History   Problem Relation Age of Onset     Diabetes Father         ETOH abuse     Cardiovascular Maternal Grandmother         stroke     Alzheimer Disease No family hx of      Cancer No family hx of         breast, colon, uterine, ovarian     Heart Disease No family hx of      Thyroid Disease No family hx of      Anesthesia Reaction No family hx of      SOCIAL HISTORY:   Social History     Tobacco Use     Smoking status: Current Every Day Smoker     Packs/day: 1.00     Years: 41.00     Pack years: 41.00     Smokeless tobacco: Never Used     Tobacco comment: declines quit line 2-12-13   Substance Use Topics     Alcohol use: Yes     Comment: rare     PROBLEM LIST:   Patient Active Problem List     Diagnosis Date Noted     Cavitating mass in right upper lung lobe 2019     Priority: Medium     Adjustment disorder with mixed emotional features 2018     Priority: Medium     Osteoarthritis of lumbosacral spine without myelopathy 2018     Priority: Medium     Osteoarthritis of lumbar spine with myelopathy 2016     Priority: Medium     Spinal stenosis 02/15/2016     Priority: Medium     Chronic pain disorder 2014     Priority: Medium     GERD (gastroesophageal reflux disease) 2013     Priority: Medium     DDD (degenerative disc disease), lumbar 2013     Priority: Medium     Mixed hyperlipidemia 2013     Priority: Medium     Tobacco use 2013     Priority: Medium     Moderate major depression (H) 2013     Priority: Medium     Female stress incontinence 2011     Priority: Medium     Cystocele, midline 2011     Priority: Medium     Sciatica 2011     Priority: Medium     Need for prophylactic hormone replacement therapy (postmenopausal) 10/23/2003     Priority: Medium     MEDICATIONS:   Prescription Medications as of 2019       Rx Number Disp Refills Start End Last Dispensed Date Next Fill Date Owning Pharmacy    baclofen (LIORESAL) 10 MG tablet    2019   [place] [address]    Sig: Take 10 mg by mouth 2 times daily    Class: Historical    Route: Oral    diazepam (VALIUM) 5 MG tablet (Ended)    2019   [place] [address]    Sig: Take 15 mg by mouth daily     Class: Historical    Route: Oral    DULoxetine (CYMBALTA) 60 MG capsule            Sig: Take 60 mg by mouth every morning     Class: Historical    Route: Oral    GABAPENTIN            Si,200 mg 3 times daily.    Class: Historical    Route: Does not apply    HEMP OIL OR EXTRACT OR OTHER CBD CANNABINOID, NOT MEDICAL CANNABIS,        " [phone]    Sig: Inhale 1 ampule into the lungs daily     Class: Historical    Route: Inhalation    meloxicam (MOBIC) 7.5 MG tablet    6/4/2019    [phone]    Sig: Take 1 tablet by mouth 2 times daily     Class: Historical    Route: Oral    omeprazole 20 MG tablet  30 tablet 1 1/23/2013        Sig: Take 20 mg by mouth every morning Take 30-60 minutes before a meal.    Class: Historical    Route: Oral    ondansetron (ZOFRAN ODT) 4 MG ODT tab    3/28/2019    [phone]    Sig: Take 4 mg by mouth as needed    Class: Historical    Route: Oral    oxyCODONE (ROXICODONE) 5 MG tablet    7/24/2019        Sig: Take 10 mg by mouth 2 times daily     Class: Historical    Earliest Fill Date: 7/24/2019    Route: Oral        ALLERGIES: Latex and Liquid adhesive  Vital Signs 8/1/2019   Systolic 108   Diastolic 68   Pulse 71   Temperature 97.6   Respirations 12   Weight (LB) 107 lb   Height 5' 5\"   BMI (Calculated) 17.81   Pain    O2 98       Physical Examination: Vital signs are reviewed and they are stable  Constitutional: Pleasant, middle aged woman, in no acute physical distress, came with her Octavio to the appt  Cardiovascular: negative, RRR  Respiratory: negative findings: normal respiratory rate and rhythm, lungs clear to auscultation  Musculoskeletal: IN a wheelchair, uses a cane to walk the short distances she can tolerate.  She has chronic debilitating back pain  Skin: no suspicious lesions or rashes  Neurologic: negative  Psychiatric: affect normal/bright and mentation appears normal.    BMP RESULTS:  Lab Results   Component Value Date     08/01/2019    POTASSIUM 4.3 08/01/2019    CHLORIDE 103 08/01/2019    CO2 32 08/01/2019    ANIONGAP 1 (L) 08/01/2019    GLC 80 08/01/2019    BUN 8 08/01/2019 "    CR 0.67 08/01/2019    GFRESTIMATED >90 08/01/2019    GFRESTBLACK >90 08/01/2019    LARRY 8.9 08/01/2019        CBC RESULTS:  Lab Results   Component Value Date    WBC 11.3 (H) 08/01/2019    RBC 4.31 08/01/2019    HGB 14.8 08/01/2019    HCT 45.3 08/01/2019     (H) 08/01/2019    MCH 34.3 (H) 08/01/2019    MCHC 32.7 08/01/2019    RDW 12.8 08/01/2019     08/01/2019       INR/PTT:  Lab Results   Component Value Date    INR 0.96 08/01/2019       Diagnostic studies: See PET CT scan    PROVIDER NOTE:  I explained the procedure to adonis and her , Octavio.      This included:  Preparing for the procedure, the actual procedure and recovery.  I explained the risk of the lung biopsy:  pneumothorax, hemoptysis, possible need for a chest tube and overnight stay in the hospital,  bleeding from the lung requiring an embolization procedure and pain from the procedure.  I explained that usually the results return after three to four business days.    I explained that they would be contacted by a nurse coordinator following this to determine a future plan.  Thank you for involving us in the care of your patient.    50 minutes was spent with Julia and her , Octavio.  40 minutes was spent in counseling.  Rosa Chacon MS, APRN, CNS, CRN  Clinical Nurse Specialist  Interventional Radiology  [phone] (voice mail)  [phone] (pager)    CC  Patient Care Team:  Aggie Zavala as PCP - General (Family Practice)  Jose De Jesus Mejia MD as PCP - Orthopaedics  ENEIDA TO

## 2019-08-02 PROBLEM — M47.817 OSTEOARTHRITIS OF LUMBOSACRAL SPINE WITHOUT MYELOPATHY: Status: ACTIVE | Noted: 2018-08-28

## 2019-08-02 PROBLEM — F43.29 ADJUSTMENT DISORDER WITH MIXED EMOTIONAL FEATURES: Status: ACTIVE | Noted: 2018-08-28

## 2019-08-02 LAB
ABO + RH BLD: NORMAL
ABO + RH BLD: NORMAL
BLD GP AB SCN SERPL QL: NORMAL
BLOOD BANK CMNT PATIENT-IMP: NORMAL
BLOOD BANK CMNT PATIENT-IMP: NORMAL
SPECIMEN EXP DATE BLD: NORMAL

## 2019-08-02 NOTE — PROGRESS NOTES
First Name: Julia   Age: 54 year old   Referring Physician: Dr. Soto   REASON FOR REFERRAL: Consult for lung lesion biopsy    Assessment:  Julia is a 55 yo with an 85 pound weight loss since 2013 that was unintentional.  She has a 41 pack yr hx of smoking and continues to smoking.  Chronic back pain, s/p many back surgeries, uses a cane and wheelchair.  She had a CT chest evaluating her unexplained weight loss and found the right upper lobe lung lesion that is PET avid and concerning for malignancy, biopsy is requested.    Plan:  Image guided biopsy of right upper lobe lung lesion  Please work with patient to make her most comfortable for the biopsy.  She will not be able to lay prone, but she can lay with her left side down most easily  She is working on smoking cessation, for a surgical procedure in the future.    HPI: This is a patient who from [id number] lost 35 pounds and then from 2016- 2017 she lost 25 pounds and then from December 2017 to present she lost another 25 pounds.  She did not do this intentionally and her PCP has been desperately trying to figure why it is happening.   .She has debilitating back problems and has had multiple surgeries and uses a wheelchair to get around here at the clinics, she also has a cane that she uses.  In 2010 she had normal bone density.  She has a 41 pack yr hx of smoking and continues to smoke.  Her PCP performed a CT of the chest on 5/20/19 and found that she had a right upper lobe lung lesion.  She was able to have a PET CT scan performed 7/18/2019.  She has a 2.3 cm irregularly shaped lesion with internal cavitation and it abuts the pleural.  SUV max is 9.8.  The case was discussed at lung nodule conference, Dr. Thomas was present from Interventional Radiology, the patient was approved for percutaneous biopsy.      series 8, image 33    PAST MEDICAL HISTORY:   Past Medical History:   Diagnosis Date     'light-for-dates' infant with signs of fetal malnutrition       Abdominal aortic aneurysm (H)      Abnormal Papanicolaou smear of cervix and cervical HPV     Abn. 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 2/23/13    Hospitalized     PAST SURGICAL HISTORY:   Past Surgical History:   Procedure Laterality Date     BLADDER SURGERY  2010     C COMBINED ANT/POST COLPORRHAPHY  02/22/13     C LIGATE FALLOPIAN TUBE       CHOLECYSTECTOMY, LAPOROSCOPIC  08/27/2014     chondrectomy of the spine  03/01/2008    discectomy     HAND SURGERY  2016     laminotomy for decompression and exploration  03/22/2016     rw back  11-1-12;11-15-12    2 nerves cut and cauterized in low back     ANDRES and BSO  2002    bleeding     FAMILY HISTORY:   Family History   Problem Relation Age of Onset     Diabetes Father         ETOH abuse     Cardiovascular Maternal Grandmother         stroke     Alzheimer Disease No family hx of      Cancer No family hx of         breast, colon, uterine, ovarian     Heart Disease No family hx of      Thyroid Disease No family hx of      Anesthesia Reaction No family hx of      SOCIAL HISTORY:   Social History     Tobacco Use     Smoking status: Current Every Day Smoker     Packs/day: 1.00     Years: 41.00     Pack years: 41.00     Smokeless tobacco: Never Used     Tobacco comment: declines quit line 2-12-13   Substance Use Topics     Alcohol use: Yes     Comment: rare     PROBLEM LIST:   Patient Active Problem List    Diagnosis Date Noted     Cavitating mass in right upper lung lobe 07/25/2019     Priority: Medium     Adjustment disorder with mixed emotional features 08/28/2018     Priority: Medium     Osteoarthritis of lumbosacral spine without myelopathy 08/28/2018     Priority: Medium     Osteoarthritis of  lumbar spine with myelopathy 2016     Priority: Medium     Spinal stenosis 02/15/2016     Priority: Medium     Chronic pain disorder 2014     Priority: Medium     GERD (gastroesophageal reflux disease) 2013     Priority: Medium     DDD (degenerative disc disease), lumbar 2013     Priority: Medium     Mixed hyperlipidemia 2013     Priority: Medium     Tobacco use 2013     Priority: Medium     Moderate major depression (H) 2013     Priority: Medium     Female stress incontinence 2011     Priority: Medium     Cystocele, midline 2011     Priority: Medium     Sciatica 2011     Priority: Medium     Need for prophylactic hormone replacement therapy (postmenopausal) 10/23/2003     Priority: Medium     MEDICATIONS:   Prescription Medications as of 2019       Rx Number Disp Refills Start End Last Dispensed Date Next Fill Date Owning Pharmacy    baclofen (LIORESAL) 10 MG tablet    2019   [phone]    Sig: Take 10 mg by mouth 2 times daily    Class: Historical    Route: Oral    diazepam (VALIUM) 5 MG tablet (Ended)    2019   [address] [phone]    Sig: Take 15 mg by mouth daily     Class: Historical    Route: Oral    DULoxetine (CYMBALTA) 60 MG capsule            Sig: Take 60 mg by mouth every morning     Class: Historical    Route: Oral    GABAPENTIN            Si,200 mg 3 times daily.    Class: Historical    Route: Does not apply    HEMP OIL OR EXTRACT OR OTHER CBD CANNABINOID, NOT MEDICAL CANNABIS,        [address]    Sig: Inhale 1 ampule into the lungs daily     Class: Historical    Route: Inhalation    meloxicam (MOBIC) 7.5 MG tablet    2019    [address]  [address]    Sig: Take 1 tablet by mouth 2 times daily     Class: Historical    Route: Oral    omeprazole 20 MG tablet  30 tablet 1 1/23/2013        Sig: Take 20 mg by mouth every morning Take 30-60 minutes before a meal.    Class: Historical    Route: Oral    ondansetron (ZOFRAN ODT) 4 MG ODT tab    3/28/2019    [place], [place] - [address] [place] [address] [address]    Sig: Take 4 mg by mouth as needed    Class: Historical    Route: Oral    oxyCODONE (ROXICODONE) 5 MG tablet    7/24/2019        Sig: Take 10 mg by mouth 2 times daily     Class: Historical    Earliest Fill Date: 7/24/2019    Route: Oral        ALLERGIES: Latex and Liquid adhesive  Vital Signs 8/1/2019   Systolic 108   Diastolic 68   Pulse 71   Temperature 97.6   Respirations 12   Weight (LB) 107 lb   Height 5' 5\"   BMI (Calculated) 17.81   Pain    O2 98     ROS:  Answers for HPI/ROS submitted by the patient on 8/1/2019   General Symptoms: Yes  Skin Symptoms: Yes  HENT Symptoms: Yes  EYE SYMPTOMS: No  HEART SYMPTOMS: Yes  LUNG SYMPTOMS: Yes  INTESTINAL SYMPTOMS: No  URINARY SYMPTOMS: No  GYNECOLOGIC SYMPTOMS: No  BREAST SYMPTOMS: No  SKELETAL SYMPTOMS: Yes  BLOOD SYMPTOMS: Yes  NERVOUS SYSTEM SYMPTOMS: Yes  MENTAL HEALTH SYMPTOMS: Yes  Fever: No  Loss of appetite: Yes  Weight loss: Yes  Weight gain: No  Fatigue: Yes  Night sweats: Yes  Chills: Yes  Increased stress: Yes  Excessive hunger: No  Excessive thirst: Yes  Feeling hot or cold when others believe the temperature is normal: Yes  Loss of height: No  Post-operative complications: No  Surgical site pain: No  Hallucinations: No  Change in or Loss of Energy: Yes  Hyperactivity: No  Confusion: No  Changes in hair: No  Changes in moles/birth marks: Yes  Itching: Yes  Rashes: Yes  Changes in nails: No  Acne: No  Hair in places you don't want it: No  Change in facial hair: No  Warts: No  Non-healing sores: No  Scarring: Yes  Flaking of skin: Yes  Color changes of hands/feet in " cold : No  Sun sensitivity: No  Skin thickening: No  Ear pain: No  Ear discharge: No  Hearing loss: No  Tinnitus: No  Nosebleeds: No  Congestion: No  Sinus pain: No  Trouble swallowing: No   Voice hoarseness: No  Mouth sores: Yes  Sore throat: No  Tooth pain: No  Gum tenderness: Yes  Bleeding gums: No  Change in taste: No  Change in sense of smell: No  Dry mouth: Yes  Hearing aid used: No  Neck lump: No  Cough: No  Sputum or phlegm: No  Coughing up blood: No  Difficulty breating or shortness of breath: Yes  Snoring: No  Wheezing: No  Difficulty breathing on exertion: Yes  Nighttime Cough: No  Difficulty breathing when lying flat: No  Chest pain or pressure: No  Fast or irregular heartbeat: No  Pain in legs with walking: Yes  Trouble breathing while lying down: No  Fingers or toes appear blue: No  High blood pressure: No  Low blood pressure: No  Fainting: No  Murmurs: No  Pacemaker: No  Varicose veins: Yes  Edema or swelling: No  Wake up at night with shortness of breath: No  Light-headedness: No  Exercise intolerance: Yes  Back pain: Yes  Muscle aches: Yes  Neck pain: Yes  Swollen joints: Yes  Joint pain: Yes  Bone pain: Yes  Muscle cramps: Yes  Muscle weakness: Yes  Joint stiffness: Yes  Bone fracture: Yes  Anemia: No  Swollen glands: No  Easy bleeding or bruising: Yes  Trouble with coordination: Yes  Dizziness or trouble with balance: Yes  Fainting or black-out spells: No  Memory loss: No  Headache: No  Seizures: No  Speech problems: No  Tingling: Yes  Tremor: No  Weakness: Yes  Difficulty walking: Yes  Paralysis: No  Numbness: Yes  Nervous or Anxious: Yes  Depression: Yes  Trouble sleeping: No  Trouble thinking or concentrating: No  Mood changes: Yes  Panic attacks: No    Physical Examination: Vital signs are reviewed and they are stable  Constitutional: Pleasant, middle aged woman, in no acute physical distress, came with her , Octavio to the appt  Cardiovascular: negative, RRR  Respiratory: negative  findings: normal respiratory rate and rhythm, lungs clear to auscultation  Musculoskeletal: IN a wheelchair, uses a cane to walk the short distances she can tolerate.  She has chronic debilitating back pain  Skin: no suspicious lesions or rashes  Neurologic: negative  Psychiatric: affect normal/bright and mentation appears normal.    BMP RESULTS:  Lab Results   Component Value Date     08/01/2019    POTASSIUM 4.3 08/01/2019    CHLORIDE 103 08/01/2019    CO2 32 08/01/2019    ANIONGAP 1 (L) 08/01/2019    GLC 80 08/01/2019    BUN 8 08/01/2019    CR 0.67 08/01/2019    GFRESTIMATED >90 08/01/2019    GFRESTBLACK >90 08/01/2019    LARRY 8.9 08/01/2019        CBC RESULTS:  Lab Results   Component Value Date    WBC 11.3 (H) 08/01/2019    RBC 4.31 08/01/2019    HGB 14.8 08/01/2019    HCT 45.3 08/01/2019     (H) 08/01/2019    MCH 34.3 (H) 08/01/2019    MCHC 32.7 08/01/2019    RDW 12.8 08/01/2019     08/01/2019       INR/PTT:  Lab Results   Component Value Date    INR 0.96 08/01/2019       Diagnostic studies: See PET CT scan    PROVIDER NOTE:  I explained the procedure to adonis and her , Octavio.      This included:  Preparing for the procedure, the actual procedure and recovery.  I explained the risk of the lung biopsy:  pneumothorax, hemoptysis, possible need for a chest tube and overnight stay in the hospital,  bleeding from the lung requiring an embolization procedure and pain from the procedure.  I explained that usually the results return after three to four business days.    I explained that they would be contacted by a nurse coordinator following this to determine a future plan.  Thank you for involving us in the care of your patient.    50 minutes was spent with Julia and her , Octavio.  40 minutes was spent in counseling.  Rosa Chacon MS, APRN, CNS, CRN  Clinical Nurse Specialist  Interventional Radiology  [phone] (voice mail)  [phone] (pager)    CC  Patient Care Team:  [place]  Aggie Lee as PCP - General (Family Practice)  Jose De Jesus Mejia MD as PCP - Orthopaedics  ENEIDA TO

## 2019-08-08 ENCOUNTER — TELEPHONE (OUTPATIENT)
Dept: INTERVENTIONAL RADIOLOGY/VASCULAR | Facility: CLINIC | Age: 54
End: 2019-08-08

## 2019-08-12 ENCOUNTER — APPOINTMENT (OUTPATIENT)
Dept: INTERVENTIONAL RADIOLOGY/VASCULAR | Facility: CLINIC | Age: 54
DRG: 200 | End: 2019-08-12
Attending: RADIOLOGY
Payer: MEDICARE

## 2019-08-12 ENCOUNTER — APPOINTMENT (OUTPATIENT)
Dept: GENERAL RADIOLOGY | Facility: CLINIC | Age: 54
DRG: 200 | End: 2019-08-12
Attending: RADIOLOGY
Payer: MEDICARE

## 2019-08-12 ENCOUNTER — HOSPITAL ENCOUNTER (INPATIENT)
Facility: CLINIC | Age: 54
LOS: 3 days | Discharge: HOME | DRG: 200 | End: 2019-08-15
Attending: RADIOLOGY | Admitting: INTERNAL MEDICINE
Payer: MEDICARE

## 2019-08-12 ENCOUNTER — APPOINTMENT (OUTPATIENT)
Dept: INTERVENTIONAL RADIOLOGY/VASCULAR | Facility: CLINIC | Age: 54
DRG: 200 | End: 2019-08-12
Attending: CLINICAL NURSE SPECIALIST
Payer: MEDICARE

## 2019-08-12 ENCOUNTER — APPOINTMENT (OUTPATIENT)
Dept: MEDSURG UNIT | Facility: CLINIC | Age: 54
DRG: 200 | End: 2019-08-12
Attending: RADIOLOGY
Payer: MEDICARE

## 2019-08-12 DIAGNOSIS — M48.062 SPINAL STENOSIS OF LUMBAR REGION WITH NEUROGENIC CLAUDICATION: ICD-10-CM

## 2019-08-12 DIAGNOSIS — M54.32 SCIATICA OF LEFT SIDE: ICD-10-CM

## 2019-08-12 DIAGNOSIS — M47.817 OSTEOARTHRITIS OF LUMBOSACRAL SPINE WITHOUT MYELOPATHY: ICD-10-CM

## 2019-08-12 DIAGNOSIS — R91.1 LESION OF RIGHT LUNG: ICD-10-CM

## 2019-08-12 DIAGNOSIS — C80.1 ADENOCARCINOMA (H): ICD-10-CM

## 2019-08-12 DIAGNOSIS — M47.16 OSTEOARTHRITIS OF LUMBAR SPINE WITH MYELOPATHY: ICD-10-CM

## 2019-08-12 DIAGNOSIS — Z79.890 NEED FOR PROPHYLACTIC HORMONE REPLACEMENT THERAPY (POSTMENOPAUSAL): Primary | ICD-10-CM

## 2019-08-12 PROBLEM — J95.811 PNEUMOTHORAX AFTER BIOPSY: Status: ACTIVE | Noted: 2019-08-12

## 2019-08-12 LAB — RADIOLOGIST FLAGS: ABNORMAL

## 2019-08-12 PROCEDURE — 40000168 ZZH STATISTIC PP CARE STAGE 3

## 2019-08-12 PROCEDURE — 27211039 ZZH NEEDLE CR2

## 2019-08-12 PROCEDURE — 96374 THER/PROPH/DIAG INJ IV PUSH: CPT

## 2019-08-12 PROCEDURE — 00000159 ZZHCL STATISTIC H-SEND OUTS PREP: Performed by: THORACIC SURGERY (CARDIOTHORACIC VASCULAR SURGERY)

## 2019-08-12 PROCEDURE — 88360 TUMOR IMMUNOHISTOCHEM/MANUAL: CPT | Performed by: THORACIC SURGERY (CARDIOTHORACIC VASCULAR SURGERY)

## 2019-08-12 PROCEDURE — C1729 CATH, DRAINAGE: HCPCS

## 2019-08-12 PROCEDURE — 25000132 ZZH RX MED GY IP 250 OP 250 PS 637: Mod: GY | Performed by: STUDENT IN AN ORGANIZED HEALTH CARE EDUCATION/TRAINING PROGRAM

## 2019-08-12 PROCEDURE — 88341 IMHCHEM/IMCYTCHM EA ADD ANTB: CPT | Performed by: THORACIC SURGERY (CARDIOTHORACIC VASCULAR SURGERY)

## 2019-08-12 PROCEDURE — 25000125 ZZHC RX 250: Performed by: STUDENT IN AN ORGANIZED HEALTH CARE EDUCATION/TRAINING PROGRAM

## 2019-08-12 PROCEDURE — 99222 1ST HOSP IP/OBS MODERATE 55: CPT | Mod: AI | Performed by: INTERNAL MEDICINE

## 2019-08-12 PROCEDURE — 25000128 H RX IP 250 OP 636: Performed by: RADIOLOGY

## 2019-08-12 PROCEDURE — 81445 SO NEO GSAP 5-50DNA/DNA&RNA: CPT | Performed by: THORACIC SURGERY (CARDIOTHORACIC VASCULAR SURGERY)

## 2019-08-12 PROCEDURE — 27210738 ZZH ACCESSORY CR2

## 2019-08-12 PROCEDURE — 32405 CT LUNG MEDIASTINUM BIOPSY: CPT

## 2019-08-12 PROCEDURE — 12000001 ZZH R&B MED SURG/OB UMMC

## 2019-08-12 PROCEDURE — 40000986 XR CHEST 1 VW

## 2019-08-12 PROCEDURE — 88305 TISSUE EXAM BY PATHOLOGIST: CPT | Performed by: THORACIC SURGERY (CARDIOTHORACIC VASCULAR SURGERY)

## 2019-08-12 PROCEDURE — C1769 GUIDE WIRE: HCPCS

## 2019-08-12 PROCEDURE — 0W9930Z DRAINAGE OF RIGHT PLEURAL CAVITY WITH DRAINAGE DEVICE, PERCUTANEOUS APPROACH: ICD-10-PCS | Performed by: RADIOLOGY

## 2019-08-12 PROCEDURE — 71045 X-RAY EXAM CHEST 1 VIEW: CPT

## 2019-08-12 PROCEDURE — 25000132 ZZH RX MED GY IP 250 OP 250 PS 637: Mod: GY | Performed by: RADIOLOGY

## 2019-08-12 PROCEDURE — 88333 PATH CONSLTJ SURG CYTO XM 1: CPT | Performed by: THORACIC SURGERY (CARDIOTHORACIC VASCULAR SURGERY)

## 2019-08-12 PROCEDURE — 32557 INSERT CATH PLEURA W/ IMAGE: CPT | Mod: RT

## 2019-08-12 PROCEDURE — 0BBC3ZX EXCISION OF RIGHT UPPER LUNG LOBE, PERCUTANEOUS APPROACH, DIAGNOSTIC: ICD-10-PCS | Performed by: RADIOLOGY

## 2019-08-12 PROCEDURE — 25000128 H RX IP 250 OP 636: Performed by: STUDENT IN AN ORGANIZED HEALTH CARE EDUCATION/TRAINING PROGRAM

## 2019-08-12 PROCEDURE — 27210886 ZZH ACCESSORY CR5

## 2019-08-12 PROCEDURE — 88342 IMHCHEM/IMCYTCHM 1ST ANTB: CPT | Performed by: THORACIC SURGERY (CARDIOTHORACIC VASCULAR SURGERY)

## 2019-08-12 RX ORDER — POLYETHYLENE GLYCOL 3350 17 G/17G
17 POWDER, FOR SOLUTION ORAL DAILY PRN
Status: DISCONTINUED | OUTPATIENT
Start: 2019-08-12 | End: 2019-08-14

## 2019-08-12 RX ORDER — NICOTINE 21 MG/24HR
1 PATCH, TRANSDERMAL 24 HOURS TRANSDERMAL DAILY
Status: DISCONTINUED | OUTPATIENT
Start: 2019-08-12 | End: 2019-08-15 | Stop reason: HOSPADM

## 2019-08-12 RX ORDER — NALOXONE HYDROCHLORIDE 0.4 MG/ML
.1-.4 INJECTION, SOLUTION INTRAMUSCULAR; INTRAVENOUS; SUBCUTANEOUS
Status: DISCONTINUED | OUTPATIENT
Start: 2019-08-12 | End: 2019-08-15 | Stop reason: HOSPADM

## 2019-08-12 RX ORDER — OXYCODONE AND ACETAMINOPHEN 5; 325 MG/1; MG/1
1-2 TABLET ORAL ONCE
Status: COMPLETED | OUTPATIENT
Start: 2019-08-12 | End: 2019-08-12

## 2019-08-12 RX ORDER — DEXTROSE MONOHYDRATE 25 G/50ML
25-50 INJECTION, SOLUTION INTRAVENOUS
Status: DISCONTINUED | OUTPATIENT
Start: 2019-08-12 | End: 2019-08-15 | Stop reason: HOSPADM

## 2019-08-12 RX ORDER — ACETAMINOPHEN 325 MG/1
975 TABLET ORAL EVERY 8 HOURS PRN
Status: DISCONTINUED | OUTPATIENT
Start: 2019-08-12 | End: 2019-08-12

## 2019-08-12 RX ORDER — ONDANSETRON 4 MG/1
4 TABLET, ORALLY DISINTEGRATING ORAL EVERY 6 HOURS PRN
Status: DISCONTINUED | OUTPATIENT
Start: 2019-08-12 | End: 2019-08-15 | Stop reason: HOSPADM

## 2019-08-12 RX ORDER — SODIUM CHLORIDE 9 MG/ML
INJECTION, SOLUTION INTRAVENOUS CONTINUOUS
Status: DISCONTINUED | OUTPATIENT
Start: 2019-08-12 | End: 2019-08-12

## 2019-08-12 RX ORDER — HYDROMORPHONE HYDROCHLORIDE 1 MG/ML
1 INJECTION, SOLUTION INTRAMUSCULAR; INTRAVENOUS; SUBCUTANEOUS
Status: DISCONTINUED | OUTPATIENT
Start: 2019-08-12 | End: 2019-08-15

## 2019-08-12 RX ORDER — ACETAMINOPHEN 325 MG/1
975 TABLET ORAL EVERY 8 HOURS PRN
Status: DISCONTINUED | OUTPATIENT
Start: 2019-08-12 | End: 2019-08-15 | Stop reason: HOSPADM

## 2019-08-12 RX ORDER — LIDOCAINE 4 G/G
2 PATCH TOPICAL
Status: DISCONTINUED | OUTPATIENT
Start: 2019-08-12 | End: 2019-08-15 | Stop reason: HOSPADM

## 2019-08-12 RX ORDER — DIAZEPAM 5 MG
15 TABLET ORAL DAILY
Status: DISCONTINUED | OUTPATIENT
Start: 2019-08-13 | End: 2019-08-13

## 2019-08-12 RX ORDER — OXYCODONE HYDROCHLORIDE 5 MG/1
5 TABLET ORAL EVERY 4 HOURS PRN
Status: DISCONTINUED | OUTPATIENT
Start: 2019-08-12 | End: 2019-08-15 | Stop reason: HOSPADM

## 2019-08-12 RX ORDER — ACETAMINOPHEN 325 MG/1
650 TABLET ORAL EVERY 4 HOURS PRN
Status: DISCONTINUED | OUTPATIENT
Start: 2019-08-12 | End: 2019-08-15 | Stop reason: HOSPADM

## 2019-08-12 RX ORDER — ONDANSETRON 2 MG/ML
4 INJECTION INTRAMUSCULAR; INTRAVENOUS EVERY 6 HOURS PRN
Status: DISCONTINUED | OUTPATIENT
Start: 2019-08-12 | End: 2019-08-15 | Stop reason: HOSPADM

## 2019-08-12 RX ORDER — FLUMAZENIL 0.1 MG/ML
0.2 INJECTION, SOLUTION INTRAVENOUS
Status: DISCONTINUED | OUTPATIENT
Start: 2019-08-12 | End: 2019-08-12

## 2019-08-12 RX ORDER — GABAPENTIN 600 MG/1
1200 TABLET ORAL
Status: DISCONTINUED | OUTPATIENT
Start: 2019-08-12 | End: 2019-08-15 | Stop reason: HOSPADM

## 2019-08-12 RX ORDER — NICOTINE POLACRILEX 4 MG
15-30 LOZENGE BUCCAL
Status: DISCONTINUED | OUTPATIENT
Start: 2019-08-12 | End: 2019-08-15 | Stop reason: HOSPADM

## 2019-08-12 RX ORDER — NALOXONE HYDROCHLORIDE 0.4 MG/ML
.1-.4 INJECTION, SOLUTION INTRAMUSCULAR; INTRAVENOUS; SUBCUTANEOUS
Status: DISCONTINUED | OUTPATIENT
Start: 2019-08-12 | End: 2019-08-12

## 2019-08-12 RX ORDER — FENTANYL CITRATE 50 UG/ML
25-50 INJECTION, SOLUTION INTRAMUSCULAR; INTRAVENOUS EVERY 5 MIN PRN
Status: DISCONTINUED | OUTPATIENT
Start: 2019-08-12 | End: 2019-08-12

## 2019-08-12 RX ORDER — MELOXICAM 7.5 MG/1
7.5 TABLET ORAL
Status: DISCONTINUED | OUTPATIENT
Start: 2019-08-12 | End: 2019-08-12

## 2019-08-12 RX ORDER — DULOXETIN HYDROCHLORIDE 60 MG/1
60 CAPSULE, DELAYED RELEASE ORAL EVERY MORNING
Status: DISCONTINUED | OUTPATIENT
Start: 2019-08-13 | End: 2019-08-15 | Stop reason: HOSPADM

## 2019-08-12 RX ORDER — LIDOCAINE 40 MG/G
CREAM TOPICAL
Status: DISCONTINUED | OUTPATIENT
Start: 2019-08-12 | End: 2019-08-15 | Stop reason: HOSPADM

## 2019-08-12 RX ORDER — BACLOFEN 10 MG/1
10 TABLET ORAL 2 TIMES DAILY
Status: DISCONTINUED | OUTPATIENT
Start: 2019-08-12 | End: 2019-08-15 | Stop reason: HOSPADM

## 2019-08-12 RX ADMIN — HYDROMORPHONE HYDROCHLORIDE 0.2 MG: 1 INJECTION, SOLUTION INTRAMUSCULAR; INTRAVENOUS; SUBCUTANEOUS at 14:20

## 2019-08-12 RX ADMIN — FENTANYL CITRATE 100 MCG: 50 INJECTION INTRAMUSCULAR; INTRAVENOUS at 12:57

## 2019-08-12 RX ADMIN — HYDROMORPHONE HYDROCHLORIDE 1 MG: 1 INJECTION, SOLUTION INTRAMUSCULAR; INTRAVENOUS; SUBCUTANEOUS at 20:07

## 2019-08-12 RX ADMIN — BACLOFEN 10 MG: 10 TABLET ORAL at 21:03

## 2019-08-12 RX ADMIN — OXYCODONE HYDROCHLORIDE 5 MG: 5 TABLET ORAL at 18:55

## 2019-08-12 RX ADMIN — LIDOCAINE 2 PATCH: 560 PATCH PERCUTANEOUS; TOPICAL; TRANSDERMAL at 21:03

## 2019-08-12 RX ADMIN — MIDAZOLAM 2 MG: 1 INJECTION INTRAMUSCULAR; INTRAVENOUS at 12:57

## 2019-08-12 RX ADMIN — LIDOCAINE HYDROCHLORIDE 7 ML: 10 INJECTION, SOLUTION EPIDURAL; INFILTRATION; INTRACAUDAL; PERINEURAL at 12:57

## 2019-08-12 RX ADMIN — GABAPENTIN 1200 MG: 600 TABLET, FILM COATED ORAL at 21:03

## 2019-08-12 RX ADMIN — OXYCODONE HYDROCHLORIDE AND ACETAMINOPHEN 1 TABLET: 5; 325 TABLET ORAL at 17:24

## 2019-08-12 RX ADMIN — HYDROMORPHONE HYDROCHLORIDE 0.5 MG: 1 INJECTION, SOLUTION INTRAMUSCULAR; INTRAVENOUS; SUBCUTANEOUS at 14:12

## 2019-08-12 RX ADMIN — HYDROMORPHONE HYDROCHLORIDE 0.5 MG: 1 INJECTION, SOLUTION INTRAMUSCULAR; INTRAVENOUS; SUBCUTANEOUS at 14:42

## 2019-08-12 ASSESSMENT — ACTIVITIES OF DAILY LIVING (ADL)
DRESS: 0-->INDEPENDENT
ADLS_ACUITY_SCORE: 17
RETIRED_COMMUNICATION: 0-->UNDERSTANDS/COMMUNICATES WITHOUT DIFFICULTY
RETIRED_EATING: 2-->ASSISTIVE PERSON
BATHING: 0-->INDEPENDENT
FALL_HISTORY_WITHIN_LAST_SIX_MONTHS: NO
AMBULATION: 1-->ASSISTIVE EQUIPMENT
TRANSFERRING: 1-->ASSISTIVE EQUIPMENT
COGNITION: 0 - NO COGNITION ISSUES REPORTED
TOILETING: 0-->INDEPENDENT
SWALLOWING: 2-->DIFFICULTY SWALLOWING LIQUIDS

## 2019-08-12 ASSESSMENT — MIFFLIN-ST. JEOR
SCORE: 1084.88
SCORE: 1076.23

## 2019-08-12 NOTE — PROGRESS NOTES
Patient Name: Julia Boudreaux  Medical Record Number: [id number]  Today's Date: 8/12/2019    Procedure: Right lung biopsy   Proceduralist: Dr. Gaming    Sedation start time: 1225  Sedation end time: 1255  Sedation medications administered: 2mg versed and 100mcg fentanyl   Total sedation time: 30 minutes     Procedure start time: 1225  Puncture time: 1227  Procedure end time: 1255    Report given to: 2A    Other Notes: Pt arrived to IR room CT-2 from . Consent reviewed. Pt denies any questions or concerns regarding procedure. Pt positioned prone and monitored per protocol. Air leak noted post-procedure. Stat chest xray ordered now and in one hour. Pt transferred back to .

## 2019-08-12 NOTE — PROCEDURES
[facility], [place]    Procedure: IR Procedure Note  Date/Time: 8/12/2019 1:49 PM  Performed by: Dean Gaming MD  Authorized by: Dean Gaming MD     UNIVERSAL PROTOCOL   Site Marked: Yes  Prior Images Obtained and Reviewed:  Yes  Required items: Required blood products, implants, devices and special equipment available    Patient identity confirmed:  Verbally with patient  Patient was reevaluated immediately before administering moderate or deep sedation or anesthesia  Confirmation Checklist:  Patient's identity using two indicators, relevant allergies, procedure was appropriate and matched the consent or emergent situation and correct equipment/implants were available  Time out: Immediately prior to the procedure a time out was called    Preparation: Patient was prepped and draped in usual sterile fashion       ANESTHESIA    Local Anesthetic: Lidocaine 1% without epinephrine  Anesthetic Total (mL):  5      SEDATION    Patient Sedated: No    See dictated procedure note for full details.  Findings: Moderate right pneumo. 14F right apical chest tube placed.    Specimens: none    Plan: Admit for chest tube to suction    PROCEDURE   Patient Tolerance:  Patient tolerated the procedure well with no immediate complications    Time of Sedation in Minutes by Physician:  0

## 2019-08-12 NOTE — PROGRESS NOTES
Pt arrived back from IR at @1300 via liter accomapanied by nurse.Pt O2 sats were dropping on RA so pt was placed on 4L NC and O2 sats came up to 93.R  Back scapula  site was C/D/I .No hematoma noted.IR nurse came back within 15 minutes and took the pt back to IR to place a chest tube so  informed that pt will be spending the night.

## 2019-08-12 NOTE — PROGRESS NOTES
Pt returned from IR at 1350 with a right ant chest tube.There is an air leak and its hooked up to -20cm of suction.Pt complaining of chest and back pain so warm pack placed on pt back by the bx site and IV dilaudid given.

## 2019-08-12 NOTE — PROGRESS NOTES
1613  Care assumed from PATRIZIA Mcgrath RN.  Pt is resting in bed with O2 on at 2L/NC.  Chest tube in place Right Upper Anterior chest to waterseal drainage at 20 cm of wall suction.  Scant amt of bloody drainage noted in drainage area.  States that she continues to have pain with deep breath & with moving her right arm.  Heat pack in place.  Will speak to MD for pain meds.  CTRN  1715  Percocet 1 tab 5-325mg given now for posterior upper lung field pain.  Placed on bedpan.  Voided 250 ml of clear yellow urine.   at bedside.  Report has been called to 5B.  IV continues to infuse at 75 ml/hr.  CTRN  4610  Transferred to 5B per cart by 2A RN.  CTRN

## 2019-08-12 NOTE — PROGRESS NOTES
Patient Name: Julia Boudreaux  Medical Record Number: [id number]  Today's Date: 8/12/2019    Procedure: Chest Tube Placement  Proceduralist: Dr. Gaming    Procedure start time: 1330  Puncture time: 1335  Procedure end time: 1350    Report given to: Julia PIERRE    Other Notes: Pt arrived to IR room 3 from . Consent reviewed. Pt denies any questions or concerns regarding procedure. Pt positioned supine and monitored per protocol. Pt tolerated procedure without any noted complications. Pt transferred back to 2A.

## 2019-08-12 NOTE — PRE-PROCEDURE
GENERAL PRE-PROCEDURE:   Procedure:  Right lung nodule biopsy  Date/Time:  8/12/2019 11:22 AM    Written consent obtained?: Yes    Risks and benefits: Risks, benefits and alternatives were discussed    Consent given by:  Patient  Patient states understanding of procedure being performed: Yes    Patient's understanding of procedure matches consent: Yes    Procedure consent matches procedure scheduled: Yes    Expected level of sedation:  Moderate  Appropriately NPO:  Yes  ASA Class:  Class 1- healthy patient  Mallampati  :  Grade 1- soft palate, uvula, tonsillar pillars, and posterior pharyngeal wall visible  Lungs:  Lungs clear with good breath sounds bilaterally  Heart:  Normal heart sounds and rate  History & Physical reviewed:  History and physical reviewed and no updates needed  Statement of review:  I have reviewed the lab findings, diagnostic data, medications, and the plan for sedation

## 2019-08-12 NOTE — H&P
[facility]    History and Physical - [place] [address] Service        Date of Admission:  8/12/2019    Assessment & Plan   Julia Boudreaux is a 54 year old female w/ a PMH of lumbar degenerative disc dx s/p 2 surgeries, tobacco use, and MDD who presented for an elective IR-guided perc bx of a RUL nodule, c/b pneumothorax s/p chest tube.      #Iatrogenic pneumothorax  Significantly improved on CXR after chest tube placement   - Chest tube on suction overnight  - IR consult for management, CXR in AM   - Supplemental O2 overnight     #Acute biopsy site pain   #Chronic back pain 2/2 multiple back surgeries and lumbar degenerative disk disease   - Oxy 5mg Q4H PRN (patient reports Tylenol upsets stomach, holding home meloxicam given increased risk of bleeding); lidocaine patch; Dilaudid IV PRN for breakthrough pain tonight   - Cont baclofen and gabapentin     #RUL pulm nodule s/p IR-guided bx   Nodule PET- avid, c/f malignancy in the setting of smoking  - Pathology pending    #MDD  #ALLIE  - Cont Valium and duloxetine     #GERD  - Cont PPI     #Tobacco use d/o  Currently smokes 1PPD   - Nicotine patch      Diet: regular    Fluids: none  DVT Prophylaxis: Pneumatic Compression Devices  Stark Catheter: not present  Code Status: full    Disposition Plan   Expected discharge: 2 - 3 days, recommended to prior living arrangement once adequate pain management/ tolerating PO medications and chest tube removed.   Entered: Bessie Parekh MD 08/12/2019, 3:16 PM     The patient's care was discussed with the Attending Physician, Dr. Valdez.    Bessie Parekh MD  [place] [address] Service  [facility]  Pager: [phone]  Please see sticky note for cross cover information  ____________________________________________________________________  Chief Complaint   Pneumothorax     History is obtained from the patient    History of Present Illness   Julia Boudreaux is a 54 year old  female w/ a PMH of lumbar degenerative disc dx s/p 2 surgeries, tobacco use, and MDD.    The patient received an IR-guided bx of a RUL nodule on 8/12. In the PACU, she became hypoxic and CXR revealed a moderate R sided pneumothorax. A chest tube was placed and the patient was admitted to medicine.     Review of Systems    The 10 point Review of Systems is negative other than noted in the HPI or here.     Past Medical History    I have reviewed this patient's medical history and updated it with pertinent information if needed.   Past Medical History:   Diagnosis Date     'light-for-dates' infant with signs of fetal malnutrition      Abdominal aortic aneurysm (H)      Abnormal Papanicolaou smear of cervix and cervical HPV     Abn. 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  Past Surgical History:   Procedure Laterality Date     BLADDER SURGERY  2010     C COMBINED ANT/POST COLPORRHAPHY  02/22/13     C LIGATE FALLOPIAN TUBE       CHOLECYSTECTOMY, LAPOROSCOPIC  08/27/2014     chondrectomy of the spine  03/01/2008    discectomy     HAND SURGERY  2016     laminotomy for decompression and exploration  03/22/2016     rw back  11-1-12;11-15-12    2 nerves cut and cauterized in low back     ANDRES and BSO  2002    bleeding        Social History   I have reviewed this patient's social history and updated it with pertinent information if needed. Julia Boudreaux  reports that she has been smoking.  She has a 41.00 pack-year smoking history. She has never used smokeless tobacco. She reports that she drinks alcohol.  She reports that she does not use drugs.    Family History   I have reviewed this patient's family history and updated it with pertinent information if needed.   Family History   Problem Relation Age of Onset     Diabetes Father         ETOH abuse     Cardiovascular Maternal Grandmother         stroke     Alzheimer Disease No family hx of      Cancer No family hx of         breast, colon, uterine, ovarian     Heart Disease No family hx of      Thyroid Disease No family hx of      Anesthesia Reaction No family hx of        Prior to Admission Medications   Prior to Admission Medications   Prescriptions Last Dose Informant Patient Reported? Taking?   DULoxetine (CYMBALTA) 60 MG capsule 2019 Self Yes Yes   Sig: Take 60 mg by mouth every morning    GABAPENTIN 2019 Self Yes Yes   Si,200 mg 3 times daily.   HEMP OIL OR EXTRACT OR OTHER CBD CANNABINOID, NOT MEDICAL CANNABIS, 2019 Self Yes Yes   Sig: Inhale 1 ampule into the lungs daily    baclofen (LIORESAL) 10 MG tablet 2019 Self Yes Yes   Sig: Take 10 mg by mouth 2 times daily   diazepam (VALIUM) 5 MG tablet 2019 Self Yes Yes   Sig: Take 15 mg by mouth daily    meloxicam (MOBIC) 7.5 MG tablet Past Month Self Yes Yes   Sig: Take 1 tablet by mouth 2 times daily    omeprazole 20 MG tablet 2019 Self Yes Yes   Sig: Take 20 mg by mouth every morning Take 30-60 minutes before a meal.   ondansetron (ZOFRAN ODT) 4 MG ODT tab Past Month Self Yes Yes   Sig: Take 4 mg by mouth as needed   oxyCODONE (ROXICODONE) 5 MG tablet 2019 at 1100 Self Yes Yes   Sig: Take 10 mg by mouth 2 times daily       Facility-Administered Medications: None     Allergies   Allergies   Allergen Reactions     Latex Rash     Liquid Adhesive Rash       Physical Exam   Vital Signs: Temp: 98.1  F (36.7  C) Temp src: Oral BP: 121/67 Pulse: 60 Heart Rate: 66 Resp: 20 SpO2: 98 % O2 Device: Nasal cannula Oxygen Delivery: 2 LPM  Weight: 107 lbs 0 oz    General Appearance: NAD, on  2L NC satting 98%  Respiratory: wheezing on the R side, chest tube and bx sites/bandages appear CDI,    Cardiovascular: RRR, no m/r/g  GI: soft, non tender, non distended   Musculoskeletal: warm and well-perfused, no edema   Neurologic: alert and oriented, no focal deficits, fluent speech    Data   Data reviewed today: I reviewed all medications, new labs and imaging results over the last 24 hours.  Recent Results (from the past 24 hour(s))   CT Lung Mediastinum Biopsy    Narrative    PROCEDURES :  1. CT guided right upper lobe percutaneous lung lesion biopsy.     Clinical History: RUL Lung lesion biopsy requested.    Comparisons: Whole-body PET/CT 7/18/2019    Staff Radiologist: Dean Gaming MD    Fellow: Tyree Ochoa MD    Medications: The patient was placed on continuous monitoring.  Intravenous sedation was administered. Vital signs and sedation  monitored by nursing staff under Interventional Radiologists  supervision. The patient remained stable throughout the procedure.    Dose: 458 mGycm.    PROCEDURE: The patient understood the limitations, alternatives, and  risks of the procedure and requested the procedure be performed. Both  written and oral consent were obtained.    Limited pre-procedural scan performed demonstrated adequate lesion  size and position for biopsy.    The right posterior chest was prepped and draped in the usual sterile  fashion. Ten to one volume mixture of 1% lidocaine without epinephrine  buffered with 8.4% bicarbonate solution was used for local anesthesia.      Under CT guidance, a 19 gauge coaxial introducer needle was advanced  into the right upper lobe lung lesion. Four 20 gauge core biopsies  were obtained with a eSKY.pl biopsy gun and submitted to Pathology  who indicated that adequate specimen was obtained.    Needles removed. Sterile airtight dressing applied.    Limited post-procedural scan demonstrated moderate pneumothorax.       Impression    IMPRESSION:   1.  Four 20 gauge core biopsies obtained of the right upper lobe lesion  under CT-guidance.  2. Moderate pneumothorax. Plan for stat chest x-ray, possible chest  tube placement.   XR Chest 1 View   Result Value    Radiologist flags Large right pneumothorax (Urgent)    Narrative    XR CHEST 1 VW  8/12/2019 1:09 PM      HISTORY: ptx    COMPARISON: Chest CT same day, chest x-ray 3/18/2010.    FINDINGS:   AP radiograph of the chest. Cardiac mediastinal silhouette within  normal limits. Pulmonary vasculature is distinct. High lung volumes  without discrete focal airspace opacities or pleural effusions. Large  right pneumothorax. No pneumothorax appreciated on the left.      Impression    IMPRESSION:   Large right pneumothorax status post IR procedure. No pneumothorax  appreciated on the left.    [Urgent Result: Large right pneumothorax]    Finding was identified on 8/12/2019 1:32 PM.     KESHAWN Isidro was contacted by Dr. Morales at 8/12/2019 1:37 PM and  verbalized understanding of the urgent finding.     I have personally reviewed the examination and initial interpretation  and I agree with the findings.    KAITLIN SCHMIDT MD   XR Chest Port 1 View    Narrative    Exam: XR CHEST PORT 1 VW, 8/12/2019 6:46 PM    Indication: follow up pneumothorax    Comparison: 8/12/2019 at 1308    Findings:   AP view of the chest. Interval placement of a right apical chest tube.  Significantly improved right-sided pneumothorax with trace residual  air at the right apex. No large pleural effusion, however the left  costophrenic angle is collimated out of view. Lung volumes remain  elevated with prominent interstitial markings. No new focal  consolidation. Cardiac silhouette is within normal limits. Limited  visualization of the upper abdomen is unremarkable. Degenerative  changes of the spine and shoulders. No acute osseous findings.      Impression    Impression:   Significantly improved right-sided pneumothorax status post chest  tube  placement.    I have personally reviewed the examination and initial interpretation  and I agree with the findings.    KAITLIN SCHMIDT MD

## 2019-08-12 NOTE — PROGRESS NOTES
Patient tolerated recovery stage well. VSS, R scapula and R chest tube anterior site clean/dry/intact and no hematoma.P tis getting some relief from the dilaudid and heat pack on back.  Patient is tolerating PO  fluids. Waiting for a bed up on the floor to get admitted.Chest tube clamps at bedside.Report given to willis on 2A.

## 2019-08-12 NOTE — PROCEDURES
[facility] [place]    Procedure: IR Procedure Note  Date/Time: 8/12/2019 12:49 PM  Performed by: Tyree Ochoa MD  Authorized by: Tyree Ochoa MD   IR Fellow Physician: Don    UNIVERSAL PROTOCOL   Site Marked: Yes  Prior Images Obtained and Reviewed:  Yes  Required items: Required blood products, implants, devices and special equipment available    Patient identity confirmed:  Verbally with patient  Patient was reevaluated immediately before administering moderate or deep sedation or anesthesia  Confirmation Checklist:  Patient's identity using two indicators, relevant allergies, procedure was appropriate and matched the consent or emergent situation and correct equipment/implants were available  Time out: Immediately prior to the procedure a time out was called    Preparation: Patient was prepped and draped in usual sterile fashion       ANESTHESIA    Anesthesia: Local infiltration  Local Anesthetic:  Lidocaine 1% without epinephrine      SEDATION    Patient Sedated: Yes    Sedation Type:  Moderate (conscious) sedation  Sedation:  Diazepam and fentanyl  Vital signs: Vital signs monitored during sedation    See dictated procedure note for full details.  Findings: Per report    Specimens: core needle biopsy specimens sent for pathological analysis    Complications: Pneumothorax    Condition: Stable    Plan: cxr now and in 1 hr    PROCEDURE   Patient Tolerance:  Patient tolerated the procedure well with no immediate complications    Time of Sedation in Minutes by Physician:  30

## 2019-08-13 ENCOUNTER — APPOINTMENT (OUTPATIENT)
Dept: GENERAL RADIOLOGY | Facility: CLINIC | Age: 54
DRG: 200 | End: 2019-08-13
Attending: RADIOLOGY
Payer: MEDICARE

## 2019-08-13 LAB
ANION GAP SERPL CALCULATED.3IONS-SCNC: 2 MMOL/L (ref 3–14)
BUN SERPL-MCNC: 7 MG/DL (ref 7–30)
CALCIUM SERPL-MCNC: 9.4 MG/DL (ref 8.5–10.1)
CHLORIDE SERPL-SCNC: 102 MMOL/L (ref 94–109)
CO2 SERPL-SCNC: 33 MMOL/L (ref 20–32)
CREAT SERPL-MCNC: 0.51 MG/DL (ref 0.52–1.04)
ERYTHROCYTE [DISTWIDTH] IN BLOOD BY AUTOMATED COUNT: 12.3 % (ref 10–15)
GFR SERPL CREATININE-BSD FRML MDRD: >90 ML/MIN/{1.73_M2}
GLUCOSE SERPL-MCNC: 97 MG/DL (ref 70–99)
HCT VFR BLD AUTO: 43.9 % (ref 35–47)
HGB BLD-MCNC: 13.8 G/DL (ref 11.7–15.7)
MCH RBC QN AUTO: 33.6 PG (ref 26.5–33)
MCHC RBC AUTO-ENTMCNC: 31.4 G/DL (ref 31.5–36.5)
MCV RBC AUTO: 107 FL (ref 78–100)
PLATELET # BLD AUTO: 223 10E9/L (ref 150–450)
POTASSIUM SERPL-SCNC: 4.6 MMOL/L (ref 3.4–5.3)
RADIOLOGIST FLAGS: ABNORMAL
RBC # BLD AUTO: 4.11 10E12/L (ref 3.8–5.2)
SODIUM SERPL-SCNC: 138 MMOL/L (ref 133–144)
WBC # BLD AUTO: 9.8 10E9/L (ref 4–11)

## 2019-08-13 PROCEDURE — 71045 X-RAY EXAM CHEST 1 VIEW: CPT | Mod: 77

## 2019-08-13 PROCEDURE — 71045 X-RAY EXAM CHEST 1 VIEW: CPT

## 2019-08-13 PROCEDURE — 25000132 ZZH RX MED GY IP 250 OP 250 PS 637: Mod: GY | Performed by: STUDENT IN AN ORGANIZED HEALTH CARE EDUCATION/TRAINING PROGRAM

## 2019-08-13 PROCEDURE — 12000001 ZZH R&B MED SURG/OB UMMC

## 2019-08-13 PROCEDURE — 99233 SBSQ HOSP IP/OBS HIGH 50: CPT | Mod: GC | Performed by: INTERNAL MEDICINE

## 2019-08-13 PROCEDURE — 25000128 H RX IP 250 OP 636: Performed by: STUDENT IN AN ORGANIZED HEALTH CARE EDUCATION/TRAINING PROGRAM

## 2019-08-13 PROCEDURE — 80048 BASIC METABOLIC PNL TOTAL CA: CPT | Performed by: STUDENT IN AN ORGANIZED HEALTH CARE EDUCATION/TRAINING PROGRAM

## 2019-08-13 PROCEDURE — 36415 COLL VENOUS BLD VENIPUNCTURE: CPT | Performed by: STUDENT IN AN ORGANIZED HEALTH CARE EDUCATION/TRAINING PROGRAM

## 2019-08-13 PROCEDURE — 85027 COMPLETE CBC AUTOMATED: CPT | Performed by: STUDENT IN AN ORGANIZED HEALTH CARE EDUCATION/TRAINING PROGRAM

## 2019-08-13 RX ORDER — DIAZEPAM 5 MG
10 TABLET ORAL DAILY
Status: DISCONTINUED | OUTPATIENT
Start: 2019-08-13 | End: 2019-08-13

## 2019-08-13 RX ORDER — DIAZEPAM 5 MG
5 TABLET ORAL EVERY EVENING
Status: DISCONTINUED | OUTPATIENT
Start: 2019-08-13 | End: 2019-08-15 | Stop reason: HOSPADM

## 2019-08-13 RX ORDER — DIAZEPAM 5 MG
10 TABLET ORAL EVERY MORNING
Status: DISCONTINUED | OUTPATIENT
Start: 2019-08-13 | End: 2019-08-15 | Stop reason: HOSPADM

## 2019-08-13 RX ORDER — GABAPENTIN 600 MG/1
1200 TABLET ORAL 3 TIMES DAILY
COMMUNITY
End: 2020-06-04

## 2019-08-13 RX ORDER — DIAZEPAM 5 MG
5 TABLET ORAL EVERY 6 HOURS PRN
COMMUNITY
End: 2020-06-04

## 2019-08-13 RX ADMIN — GABAPENTIN 1200 MG: 600 TABLET, FILM COATED ORAL at 15:50

## 2019-08-13 RX ADMIN — ONDANSETRON 4 MG: 2 INJECTION INTRAMUSCULAR; INTRAVENOUS at 07:45

## 2019-08-13 RX ADMIN — DIAZEPAM 5 MG: 5 TABLET ORAL at 20:45

## 2019-08-13 RX ADMIN — BACLOFEN 10 MG: 10 TABLET ORAL at 07:47

## 2019-08-13 RX ADMIN — OXYCODONE HYDROCHLORIDE 5 MG: 5 TABLET ORAL at 15:50

## 2019-08-13 RX ADMIN — HYDROMORPHONE HYDROCHLORIDE 1 MG: 1 INJECTION, SOLUTION INTRAMUSCULAR; INTRAVENOUS; SUBCUTANEOUS at 10:10

## 2019-08-13 RX ADMIN — HYDROMORPHONE HYDROCHLORIDE 1 MG: 1 INJECTION, SOLUTION INTRAMUSCULAR; INTRAVENOUS; SUBCUTANEOUS at 00:02

## 2019-08-13 RX ADMIN — GABAPENTIN 1200 MG: 600 TABLET, FILM COATED ORAL at 07:47

## 2019-08-13 RX ADMIN — OXYCODONE HYDROCHLORIDE 5 MG: 5 TABLET ORAL at 20:45

## 2019-08-13 RX ADMIN — DIAZEPAM 10 MG: 5 TABLET ORAL at 09:03

## 2019-08-13 RX ADMIN — HYDROMORPHONE HYDROCHLORIDE 1 MG: 1 INJECTION, SOLUTION INTRAMUSCULAR; INTRAVENOUS; SUBCUTANEOUS at 02:56

## 2019-08-13 RX ADMIN — HYDROMORPHONE HYDROCHLORIDE 1 MG: 1 INJECTION, SOLUTION INTRAMUSCULAR; INTRAVENOUS; SUBCUTANEOUS at 06:10

## 2019-08-13 RX ADMIN — OXYCODONE HYDROCHLORIDE 5 MG: 5 TABLET ORAL at 02:17

## 2019-08-13 RX ADMIN — OMEPRAZOLE 20 MG: 20 CAPSULE, DELAYED RELEASE ORAL at 07:47

## 2019-08-13 RX ADMIN — DULOXETINE HYDROCHLORIDE 60 MG: 60 CAPSULE, DELAYED RELEASE ORAL at 07:47

## 2019-08-13 RX ADMIN — HYDROMORPHONE HYDROCHLORIDE 1 MG: 1 INJECTION, SOLUTION INTRAMUSCULAR; INTRAVENOUS; SUBCUTANEOUS at 13:50

## 2019-08-13 RX ADMIN — BACLOFEN 10 MG: 10 TABLET ORAL at 20:45

## 2019-08-13 ASSESSMENT — ACTIVITIES OF DAILY LIVING (ADL)
ADLS_ACUITY_SCORE: 15
ADLS_ACUITY_SCORE: 15
ADLS_ACUITY_SCORE: 17
ADLS_ACUITY_SCORE: 15

## 2019-08-13 ASSESSMENT — MIFFLIN-ST. JEOR: SCORE: 1110.88

## 2019-08-13 NOTE — PLAN OF CARE
Full code. Alert. Oriented x4. Patient c/o of pain on inspiration and back pain and given PRN IV Dilaudid 1mg x  3 and PRN Oxycodone   5 mg x1 with noted relief. Pt connected to chest tube at -20 water seal suction with significant air leak noted at beginning of shift.  Note that the air leak appears to be coming from between the end of the wide tubing on the chest tube  ends and the narrow tubing begins and where the insertion of the narrow tube is in the site of the chest wall as the bubbling stops briefly when this area is clamped momentarily and resumes when it is unclamped again. This RN notified Night Cross Cover to clarify and they came to evaluate patient and MD had portable xray taken of patient. Patient endorses different kind of pain than before. Gentle repositioning with patient. She has gotten up to bedside commode x2 this shift with 600 ml urine output. Patient endorses Providers notified.  Patient  2 L oxygen to maintain adequate ventilation. Patient endorses usually taking valium in the am for muscle cramps in addition to the baclofen.  She is anxious about pneumothorax.  Plan:  Continue to monitor respiratory and pain status closely and follow plan of care.  Notify MD of changes.

## 2019-08-13 NOTE — PROGRESS NOTES
[facility], [place]    Progress Note - [place] [facility] Service        Date of Admission:  8/12/2019    Assessment & Plan   Julia Boudreaux is a 54 year old female w/ a PMH of lumbar degenerative disc dx s/p 2 surgeries, tobacco use, and MDD who presented for an elective IR-guided perc bx of a RUL nodule, c/b pneumothorax s/p chest tube.        Today:  - Recurrence of pneumo after clamping trial, keep chest tube to -40 suction overnight     #Iatrogenic pneumothorax  Significantly improved on CXR after chest tube placement   - Chest tube on suction overnight  - IR consult for management, CXR in AM   - Supplemental O2 overnight       #Acute biopsy site pain   #Chronic back pain 2/2 multiple back surgeries and lumbar degenerative disk disease   - Oxy 5mg Q4H PRN (patient reports Tylenol upsets stomach, holding home meloxicam given increased risk of bleeding); lidocaine patch; Dilaudid IV PRN for breakthrough pain tonight   - Cont baclofen and gabapentin      #RUL pulm nodule s/p IR-guided bx   Nodule PET- avid, c/f malignancy in the setting of smoking  - Pathology pending     #MDD  #ALLIE  - Cont Valium and duloxetine      #GERD  - Cont PPI      #Tobacco use d/o  Currently smokes 1PPD   - Nicotine patch      Diet: regular    Fluids: none  DVT Prophylaxis: Pneumatic Compression Devices  Stark Catheter: not present  Code Status: full     Disposition Plan     Expected discharge: 2 - 3 days, recommended to prior living arrangement once adequate pain management/ tolerating PO medications and chest tube removed.   Entered: Bessie Parekh MD 08/12/2019, 3:16 PM     The patient's care was discussed with the Attending Physician, Dr. Valdez.     MD [facility] [place] [facility] Service  [facility]  Pager: [phone]  Please see sticky note for cross cover information  ______________________________________________________________________    Interval History    Air leak  overnight, CXR w/o change in small residual R apical pneumothorax; chest tube clamped this AM per IR, repeat CXR showed large R pneumo, CT placed back to suction; patient asymptomatic, pain at prior bx site improved from yesterday     Data reviewed today: I reviewed all medications, new labs and imaging results over the last 24 hours.  Physical Exam   Vital Signs: Temp: 97  F (36.1  C) Temp src: Oral BP: 114/66 Pulse: 69 Heart Rate: 59 Resp: 18 SpO2: 96 % O2 Device: Nasal cannula Oxygen Delivery: 2 LPM  Weight: 108 lbs 14.52 oz  General Appearance: NAD, on 2L NC satting 98%  Respiratory: wheezing on the R side, chest tube and bx sites/bandages appear CDI,    Cardiovascular: RRR, no m/r/g  GI: soft, non tender, non distended   Musculoskeletal: warm and well-perfused, no edema   Neurologic: alert and oriented, no focal deficits, fluent speech    Data   Recent Labs   Lab 08/13/19  0555   WBC 9.8   HGB 13.8   *         POTASSIUM 4.6   CHLORIDE 102   CO2 33*   BUN 7   CR 0.51*   ANIONGAP 2*   LARRY 9.4   GLC 97     Recent Results (from the past 24 hour(s))   XR Chest Port 1 View    Narrative    Exam: XR CHEST PORT 1 VW, 8/12/2019 6:46 PM    Indication: follow up pneumothorax    Comparison: 8/12/2019 at 1308    Findings:   AP view of the chest. Interval placement of a right apical chest tube.  Significantly improved right-sided pneumothorax with trace residual  air at the right apex. No large pleural effusion, however the left  costophrenic angle is collimated out of view. Lung volumes remain  elevated with prominent interstitial markings. No new focal  consolidation. Cardiac silhouette is within normal limits. Limited  visualization of the upper abdomen is unremarkable. Degenerative  changes of the spine and shoulders. No acute osseous findings.      Impression    Impression:   Significantly improved right-sided pneumothorax status post chest tube  placement.    I have personally reviewed the  examination and initial interpretation  and I agree with the findings.    KAITLIN SCHMIDT MD   XR Chest Port 1 View    Narrative    Exam: XR CHEST PORT 1 VW, 8/13/2019 3:48 AM    Indication: chest tube placed, now with air leak    Comparison: 8/12/2019    Findings:   AP radiograph of the chest. Revisualization of apical right-sided  chest tube. Cardiac silhouette is not enlarged. High lung volumes. No  new focal airspace opacities. Right costophrenic angles collimator on  the field-of-view. No left pleural effusion. Small residual 3 mm right  apical pneumothorax. No left pneumothorax.      Impression    Impression: Small residual right apical pneumothorax with unchanged  right apical chest tube.    I have personally reviewed the examination and initial interpretation  and I agree with the findings.    WENDY BRADFORD MD   XR Chest Port 1 View   Result Value    Radiologist flags Acute large pneumothorax (Urgent)    Narrative    TEMPORARY  8/13/2019 11:21 AM      HISTORY: eval R pneumo    COMPARISON: Chest x-ray from earlier today    FINDINGS:   Single AP chest radiograph demonstrates marked increase in right  thoracic subcutaneous emphysema and a large right pneumothorax causing  adjacent hilar atelectasis. The right-sided pigtail catheter sideholes  appear to be intrapleural, although unable to verify on a single view.  The remainder of the lung is unremarkable.      Impression    IMPRESSION:     1. New large right pneumothorax causing adjacent mass effect on the  right hilum with right apical pigtail catheter present. Recommend  urgent placement of chest tube back to suction.  2. Right thoracic subcutaneous emphysema consistent with air leak.  Malpositioning of chest tube sidehole is difficult to exclude on the  single image.    [Urgent Result: Acute large pneumothorax]    Finding was identified on 8/13/2019 11:21 AM.     Dr. Bessie Parekh  was contacted by Dr. Arias at 8/13/2019 11:31 AM and  verbalized  understanding of the urgent finding.      I have personally reviewed the examination and initial interpretation  and I agree with the findings.    KAITLIN SCHMIDT MD     Medications       baclofen  10 mg Oral BID     diazepam  10 mg Oral QAM     diazepam  5 mg Oral QPM     DULoxetine  60 mg Oral QAM     gabapentin  1,200 mg Oral Q8H     lidocaine  2 patch Transdermal Q24h    And     lidocaine   Transdermal Q24H    And     lidocaine   Transdermal Q8H     nicotine  1 patch Transdermal Daily     nicotine   Transdermal Q8H     nicotine   Transdermal Daily     omeprazole  20 mg Oral QAM     sodium chloride (PF)  3 mL Intracatheter Q8H     sodium chloride (PF)  3 mL Intracatheter Q8H

## 2019-08-13 NOTE — PROVIDER NOTIFICATION
Notified Night time Cross Cover Provider re:  Chest tube air leak Continuous. Since beginning of shift. Note it appears to be  Continuous from end of tube where the large tube stops and the narrow tube starts to insertion site at chest wall.  [facility] came to assess patient and ordered portable xray.

## 2019-08-13 NOTE — PLAN OF CARE
Pt came to unit 5B from unit 2A post IR procedure of lung BX and chest tube placement. Pt is alert and oriented times 4. In no distress. Lungs sound clear. She has Ct to -20 cm of suction. No output noted. Writer noted airleak and MD notified at bedside. CXR done and showed significant improvement of pneumothorax. Lungs sound clear. Oxygen saturation 98% on 2 liters HR 59-68 on continuous pulse ox. Has commode at bedside. Drinking coffee and ordered supper. CT and BX dressings have remained intact. Pain has been the biggest issue as at baseline pt has chronic pain and 5mg of oxycodone was not sufficient. IV dilaudid helped more per pt.

## 2019-08-13 NOTE — PHARMACY-ADMISSION MEDICATION HISTORY
Admission medication history interview status for the 8/12/2019 admission is complete. See Epic admission navigator for allergy information, pharmacy, prior to admission medications and immunization status.     Medication history interview sources:  Patient, Care Everywhere, and Sure Scripts    Changes made to PTA medication list (reason)  Added: Excedrin (per patient)  Deleted: None  Changed:   -Clarified strength of tablet of patient's gabapentin (per patient and Sure Scripts)  -Diazepam 5 mg tablets: 3 tablets daily --> 1 tablet every 6 hr PRN (per Care Everywhere and Sure Scripts. Per patient, she will often take 2 tablets at 1100 and 1 tablet PRN in the afternoon)  -Ondansetron 4 mg ODT tablets: 1 tablet PRN --> 1 tablet every 4 hr PRN (per Care Everywhere)  -Oxycodone 5 mg tablets: 2 tablets BID --> 1-2 tablets every 6 hr PRN (per Care Everywhere, Sure Scripts, and patient)    Additional medication history information: Patient was able to answer questions regarding her medication list with good reliability including dosing information and last times of administration.   -Patient reports she is also uses medical cannabis oil via inhalation for pain associated with her back and muscle spasms.        Prior to Admission medications    Medication Sig Last Dose Taking? Auth Provider   aspirin-acetaminophen-caffeine (EXCEDRIN MIGRAINE) 250-250-65 MG tablet Take 1 tablet by mouth 2 times daily as needed for headaches Past Month at PRN Yes Unknown, Entered By History   baclofen (LIORESAL) 10 MG tablet Take 10 mg by mouth 2 times daily 8/11/2019 at Unknown time Yes Reported, Patient   diazepam (VALIUM) 5 MG tablet Take 5 mg by mouth every 6 hours as needed for muscle spasms  8/12/2019 at AM - 2 tabs Yes Unknown, Entered By History   DULoxetine (CYMBALTA) 60 MG capsule Take 60 mg by mouth every morning  8/12/2019 at AM Yes Reported, Patient   gabapentin (NEURONTIN) 600 MG tablet Take 1,200 mg by mouth 3 times daily  8/12/2019 at AM Yes Unknown, Entered By History   HEMP OIL OR EXTRACT OR OTHER CBD CANNABINOID, NOT MEDICAL CANNABIS, Inhale 1 ampule into the lungs daily  8/11/2019 at Unknown time Yes Reported, Patient   meloxicam (MOBIC) 7.5 MG tablet Take 1 tablet by mouth 2 times daily  Past Month at Unknown time Yes Reported, Patient   omeprazole 20 MG tablet Take 20 mg by mouth every morning Take 30-60 minutes before a meal. 8/12/2019 at AM Yes Yoel Wallace MD   ondansetron (ZOFRAN ODT) 4 MG ODT tab Take 4 mg by mouth every 6 hours as needed for nausea or vomiting  Past Month at PRN Yes Reported, Patient   oxyCODONE (ROXICODONE) 5 MG tablet Take 5-10 mg by mouth every 6 hours as needed  8/12/2019 at 1100 Yes Reported, Patient       Medication history completed by: Braulio Espinosa, PharmD IV Student

## 2019-08-13 NOTE — CONSULTS
Julia is a pleasant 53 yo Female who had an elective 8/12/2019 CT guided Percutaneous biopsy of a PET avid right upper lobe lesion described as a 2.3 cm irregularly shaped lesion with internal cavitation that abuts the pleural. Post biopsy pt developed a pneumothorax s/p  right chest tube placed.    8/13/19 morning chest x ray showed minimal remaining pneumothorax, tube was clamped with a follow up x ray showing re-collapse of the lung , restarted chest tube suction at 40 mGh. IR will round on patient  Plan 8/14/2019  AM clamp chest tube, re-image one hour post clamping. IR will review.   If fails may consider a pulmonary consult  discussed with Dr. Jose David Milton IR RPA  [phone] Call pager  [phone] pager

## 2019-08-13 NOTE — PROGRESS NOTES
Care Coordinator Progress Note    Admission Date/Time:  8/12/2019  Attending MD:  Vida Valdez*    Data  Chart reviewed, discussed with interdisciplinary team.   Patient was admitted for:    Lesion of right lung  Need for prophylactic hormone replacement therapy (postmenopausal).    Concerns with insurance coverage for discharge needs: None.  Current Living Situation: Patient lives with spouse.  Support System: Supportive and Involved  Services Involved: None prior to admission.   Transportation at Discharge: Family or friend will provide  Transportation to Medical Appointments: Family assists if needed.   Barriers to Discharge: Medical stability, chest tube removal.         Assessment  Patient is a 54yr old female with a prior medical history of lumbar degenerative disc dx s/p 2 surgeries, tobacco use, and MDD who presented for an elective IR-guided perc biopsy of a RUL nodule c/b pneumothorax s/p chest tube placement. Writer introduced self and role of RNCC. Patient confirms that she lives in [place], [place] with her spouse. Patient denies any in home services, does report using a cane and a wheelchair for extended ambulation needs. Patient has requested orders for a 4 wheel walker w/seat and a shower chair, this has been communicated to the providers. Patient confirms that family will provide transportation at time of discharge. RNCC will continue to follow and assist with discharge planning.      Plan  Anticipated Discharge Date:  8/13/2019  Anticipated Discharge Plan:  Home    HITESH Edwards, BSN    [facility]  [address] [place] [id number]    [email or web address]    Office: [phone] Pager: [phone]  To contact weekend RNCC, dial [phone] and enter pager number [phone] at prompt. This pager can not be contacted by text page or outside line.

## 2019-08-13 NOTE — PLAN OF CARE
Pt A&Ox4. VSS on 2L. Pt up independently to bedside commode w/ some assistance due to chest tube. Voiding adequately. PIV saline locked. Pt c/o pain minimally to moderately relived by q3 IV dilaudid. Pt declines oral oxycodone due to lack of effectiveness. Chest tube was clamped for 1hr today before chest xray, showing large pneumothorax. Suction increased to -40mmHg per team. No crepitus at site.     Bubbling present in chest tube throughout shift but has stopped near end of shift. IR consults that bubbling in the C chamber is normal w/ an [place] chest tube container. Continue to monitor pt.     Continue with plan of care.

## 2019-08-13 NOTE — PROGRESS NOTES
"CLINICAL NUTRITION SERVICES - ASSESSMENT NOTE     Nutrition Prescription    RECOMMENDATIONS FOR MDs/PROVIDERS TO ORDER:  None     Malnutrition Status:    Unable to determine due to Patient off floor for procedure     Recommendations already ordered by Registered Dietitian (RD):  Boost plus with meals to optimize intake        REASON FOR ASSESSMENT  Julia Boudreaux is a/an 54 year old female assessed by the dietitian for Admission Nutrition Risk Screen for unintentional loss of 10# or more in the past two months    Chart reviewed:  PMH of lumbar degenerative disc disease s/p 2 surgeries,     --Presented for an elective IR-guided biopsy of a RUL pulmonary nodule on 8/12/19, c/b moderate R. sided pneumothorax s/p chest tube placed. Patient admitted to medicine.     --Per MD note, Nodule PET c/f malignancy,  Pathology pending    --Iatrogenic pneumothorax: Per MD note, significantly improved on CXR after chest tube placement. Chest tube on suction.     NUTRITION HISTORY  Unable to obtain. Patient off floor     CURRENT NUTRITION ORDERS  Diet: Regular  Intake/Tolerance: unable to determine     LABS  Labs reviewed    MEDICATIONS  Medications reviewed - elevated bicarb     ANTHROPOMETRICS  Height: 165.1 cm (5' 5\")  Most Recent Weight: 49.4 kg (108 lb 14.5 oz) admit wt on 8/12   IBW: 56.8 kg ( 87% IBW)   BMI: 18.12 kg /m2 - Underweight BMI <18.5  Weight History: 38 kg wt loss as compared to wt 6 years ago. No recent wt to determine timeline of wt loss. Patient has lost 43% of her body wt.   Wt Readings from Last 10 Encounters:   08/12/19 49.4 kg (108 lb 14.5 oz)   08/01/19 48.5 kg (107 lb)   07/25/19 48.1 kg (106 lb)   07/10/19 48.1 kg (106 lb 1.6 oz)   04/08/13 87.1 kg (192 lb)   03/11/13 86.8 kg (191 lb 4.8 oz)   02/12/13 89.4 kg (197 lb)   01/31/13 89.4 kg (197 lb 3.2 oz)   01/23/13 88.9 kg (196 lb)   01/14/13 89.1 kg (196 lb 8 oz)     Dosing Weight: 49 kg admit wt     ASSESSED NUTRITION NEEDS  Estimated Energy Needs: " 5915-6531 kcals/day (30 - 35 kcals/kg )  Justification: Increased needs  Estimated Protein Needs: 59-74 grams protein/day (1.2 - 1.5 grams of pro/kg)  Justification: Hypercatabolism with critical illness  Estimated Fluid Needs: (1 mL/kcal)   Justification: Maintenance    PHYSICAL FINDINGS  No edema     MALNUTRITION  % Intake: Unable to assess, however likely Severe given wt history above   % Weight Loss: Unable to assess  Subcutaneous Fat Loss: Unable to assess  Muscle Loss: Unable to assess - noted low Cr, may reflect muscle wasting   Fluid Accumulation/Edema: None noted  Malnutrition Diagnosis: Unable to determine due to Patient off floor for procedure     NUTRITION DIAGNOSIS  Predicted inadequate nutrient intake related to SOB, may hinder ability to take sufficient PO     INTERVENTIONS  Implementation  Nutrition Education: Unable to complete due to patient off floor    Medical food supplement therapy     Goals  Patient to consume % of nutritionally adequate meal trays TID, or the equivalent with supplements/snacks.     Monitoring/Evaluation  Progress toward goals will be monitored and evaluated per protocol.    Andrea Pedroza RD/JAVAN  Pager [phone]

## 2019-08-14 ENCOUNTER — APPOINTMENT (OUTPATIENT)
Dept: GENERAL RADIOLOGY | Facility: CLINIC | Age: 54
DRG: 200 | End: 2019-08-14
Attending: RADIOLOGY
Payer: MEDICARE

## 2019-08-14 LAB — COPATH REPORT: NORMAL

## 2019-08-14 PROCEDURE — 99233 SBSQ HOSP IP/OBS HIGH 50: CPT | Mod: GC | Performed by: INTERNAL MEDICINE

## 2019-08-14 PROCEDURE — 71045 X-RAY EXAM CHEST 1 VIEW: CPT

## 2019-08-14 PROCEDURE — 40000986 XR CHEST PORT 1 VW

## 2019-08-14 PROCEDURE — 40000802 ZZH SITE CHECK

## 2019-08-14 PROCEDURE — 25000128 H RX IP 250 OP 636: Performed by: STUDENT IN AN ORGANIZED HEALTH CARE EDUCATION/TRAINING PROGRAM

## 2019-08-14 PROCEDURE — 25000131 ZZH RX MED GY IP 250 OP 636 PS 637: Mod: GY | Performed by: STUDENT IN AN ORGANIZED HEALTH CARE EDUCATION/TRAINING PROGRAM

## 2019-08-14 PROCEDURE — 40000141 ZZH STATISTIC PERIPHERAL IV START W/O US GUIDANCE

## 2019-08-14 PROCEDURE — 25000132 ZZH RX MED GY IP 250 OP 250 PS 637: Mod: GY | Performed by: STUDENT IN AN ORGANIZED HEALTH CARE EDUCATION/TRAINING PROGRAM

## 2019-08-14 PROCEDURE — 12000001 ZZH R&B MED SURG/OB UMMC

## 2019-08-14 RX ORDER — POLYETHYLENE GLYCOL 3350 17 G/17G
17 POWDER, FOR SOLUTION ORAL DAILY
Status: DISCONTINUED | OUTPATIENT
Start: 2019-08-14 | End: 2019-08-15 | Stop reason: HOSPADM

## 2019-08-14 RX ADMIN — DIAZEPAM 5 MG: 5 TABLET ORAL at 17:24

## 2019-08-14 RX ADMIN — HYDROMORPHONE HYDROCHLORIDE 1 MG: 1 INJECTION, SOLUTION INTRAMUSCULAR; INTRAVENOUS; SUBCUTANEOUS at 01:45

## 2019-08-14 RX ADMIN — DIAZEPAM 10 MG: 5 TABLET ORAL at 08:10

## 2019-08-14 RX ADMIN — ONDANSETRON 4 MG: 4 TABLET, ORALLY DISINTEGRATING ORAL at 13:45

## 2019-08-14 RX ADMIN — HYDROMORPHONE HYDROCHLORIDE 1 MG: 1 INJECTION, SOLUTION INTRAMUSCULAR; INTRAVENOUS; SUBCUTANEOUS at 19:58

## 2019-08-14 RX ADMIN — LIDOCAINE 2 PATCH: 560 PATCH PERCUTANEOUS; TOPICAL; TRANSDERMAL at 19:48

## 2019-08-14 RX ADMIN — HYDROMORPHONE HYDROCHLORIDE 1 MG: 1 INJECTION, SOLUTION INTRAMUSCULAR; INTRAVENOUS; SUBCUTANEOUS at 05:01

## 2019-08-14 RX ADMIN — OMEPRAZOLE 20 MG: 20 CAPSULE, DELAYED RELEASE ORAL at 08:10

## 2019-08-14 RX ADMIN — DULOXETINE HYDROCHLORIDE 60 MG: 60 CAPSULE, DELAYED RELEASE ORAL at 08:10

## 2019-08-14 RX ADMIN — GABAPENTIN 1200 MG: 600 TABLET, FILM COATED ORAL at 08:10

## 2019-08-14 RX ADMIN — BACLOFEN 10 MG: 10 TABLET ORAL at 19:48

## 2019-08-14 RX ADMIN — GABAPENTIN 1200 MG: 600 TABLET, FILM COATED ORAL at 17:12

## 2019-08-14 RX ADMIN — HYDROMORPHONE HYDROCHLORIDE 1 MG: 1 INJECTION, SOLUTION INTRAMUSCULAR; INTRAVENOUS; SUBCUTANEOUS at 17:12

## 2019-08-14 RX ADMIN — GABAPENTIN 1200 MG: 600 TABLET, FILM COATED ORAL at 01:42

## 2019-08-14 RX ADMIN — HYDROMORPHONE HYDROCHLORIDE 1 MG: 1 INJECTION, SOLUTION INTRAMUSCULAR; INTRAVENOUS; SUBCUTANEOUS at 13:38

## 2019-08-14 RX ADMIN — BACLOFEN 10 MG: 10 TABLET ORAL at 08:10

## 2019-08-14 RX ADMIN — HYDROMORPHONE HYDROCHLORIDE 1 MG: 1 INJECTION, SOLUTION INTRAMUSCULAR; INTRAVENOUS; SUBCUTANEOUS at 08:43

## 2019-08-14 ASSESSMENT — ACTIVITIES OF DAILY LIVING (ADL)
ADLS_ACUITY_SCORE: 15

## 2019-08-14 ASSESSMENT — MIFFLIN-ST. JEOR: SCORE: 1058.08

## 2019-08-14 NOTE — PLAN OF CARE
"/61 (BP Location: Right arm)   Pulse 66   Temp 97.1  F (36.2  C) (Oral)   Resp 18   Ht 1.651 m (5' 5\")   Wt 52 kg (114 lb 10.2 oz)   SpO2 97%   BMI 19.08 kg/m   Full code. Patient alert/oriented x4. Able to communicate needs. Pt admitted with  elective  CT guided Percutaneous biopsy of PET avid right upper lung lobe lesion. Post biopsy, patient developed a  iatrogenic pneumothorax s/p right chest tube place.  Patient c/o of pain at insertion site of chest tube and medicated with PRN IV dilaudid x 2 overnight.  Pt hx of   L4-5  DDD and back surgeries along with multiple treatments for ongoing orthopedic pain.  Discussed with patient concern over patient access  To coordinating care in future as she lives in [place] and had been receiving orthopedic  care at [place].  [facility] and chronic pain referral. Chest tube had small air leak overnight. Initially, there was 48 bubbles over a  Minute, but then it reduced to 0 and fluctuated. Pt endorsed pain on inspiration. Pt reports pain less than yesterday. Up to BSC with less effort than yesterday.   Patient slept between cares, but needs to reposition due to bony prominences and discomfort with back. No lidocaine patches  But heating pad helpful on back. Plan:  Call light within reach. Hourly rounding complete, call light within reach. Continue to monitor and follow POC. Notify MD of changes.     .        "

## 2019-08-14 NOTE — PROGRESS NOTES
[facility], [place]    Progress Note - [facility] 4 Service        Date of Admission:  8/12/2019    Assessment & Plan   Julia Boudreaux is a 54 year old female w/ a PMH of lumbar degenerative disc dx s/p 2 surgeries, tobacco use, and MDD who presented for an elective IR-guided perc bx of a RUL nodule, c/b pneumothorax s/p chest tube.        Today:  - Pulm consulted, re-positioned the chest tube w/ resultant air leak; chest tube on suction overnight; recommending 5am CXR in AM, if no pneumo recommend placing on water seal for two hrs followed by repeat CXR to determine whether clamping trial is appropriate     #Iatrogenic pneumothorax  Significantly improved on CXR after chest tube placement; failed clamping trial on 8/13; s/p pulm repositioning on 8/14  - Chest tube on suction overnight  - IR and pulm following   - Supplemental O2 overnight       #Acute biopsy site pain   #Chronic back pain 2/2 multiple back surgeries and lumbar degenerative disk disease   - Oxy 5mg Q4H PRN (patient reports Tylenol upsets stomach, holding home meloxicam given increased risk of bleeding); lidocaine patch; Dilaudid IV PRN for breakthrough pain  - Cont baclofen and gabapentin      #RUL pulm nodule s/p IR-guided bx   Nodule PET- avid, c/f malignancy in the setting of smoking  - Pathology pending     #MDD  #ALLIE  - Cont Valium and duloxetine      #GERD  - Cont PPI      #Tobacco use d/o  Currently smokes 1PPD   - Nicotine patch      Diet: regular    Fluids: none  DVT Prophylaxis: Pneumatic Compression Devices  Stark Catheter: not present  Code Status: full     Disposition Plan     Expected discharge: 2 - 3 days, recommended to prior living arrangement once adequate pain management/ tolerating PO medications and chest tube removed.   Entered: Bessie Parekh MD 08/12/2019, 3:16 PM     The patient's care was discussed with the Attending Physician, Dr. Valdez.     MD Arsen [facility] 4  Service  [facility], [place]  Pager: [phone]  Please see sticky note for cross cover information  ______________________________________________________________________    Interval History    NAEO; still w/ pneumo this AM; patient asymptomatic; denied any increased pain at her bx or chest tube sites      Data reviewed today: I reviewed all medications, new labs and imaging results over the last 24 hours.  Physical Exam   Vital Signs: Temp: 98.7  F (37.1  C) Temp src: Oral BP: 131/75 Pulse: 66 Heart Rate: 69 Resp: 18 SpO2: 95 % O2 Device: Nasal cannula Oxygen Delivery: 2 LPM  Weight: 114 lbs 10.23 oz  General Appearance: NAD, on 2L NC  Respiratory: wheezing on the R side, chest tube and bx sites/bandages appear CDI,    Cardiovascular: RRR, no m/r/g  GI: soft, non tender, non distended   Musculoskeletal: warm and well-perfused, no edema   Neurologic: alert and oriented, no focal deficits, fluent speech    Data   Recent Labs   Lab 08/13/19  0555   WBC 9.8   HGB 13.8   *         POTASSIUM 4.6   CHLORIDE 102   CO2 33*   BUN 7   CR 0.51*   ANIONGAP 2*   LARRY 9.4   GLC 97     Recent Results (from the past 24 hour(s))   XR Chest Port 1 View    Narrative    XR CHEST PORT 1 VW  8/13/2019 5:26 PM      HISTORY: eval for resolution of r pneumo w/ chest tube back on suction    COMPARISON: Chest x-ray same day 1045 hours    TECHNIQUE: Semiupright frontal view of the chest    FINDINGS: Near complete resolution of the large right-sided  pneumothorax with anterior approach chest tube in place with tip  projecting over the right upper lobe. Spiculated nodular opacity to  the right of the T3 vertebral body measuring 1.5 cm. Lungs are  hyperinflated with splaying of the vasculature. Left costophrenic  angle is clipped from field-of-view. No pleural effusion. Cardiac  mediastinal silhouette is within normal limits. Trachea is midline.  The upper abdomen is unremarkable. Moderate subcutaneous  emphysema  along the right lateral chest wall and suprapubic space.      Impression    IMPRESSION:   1. Near complete resolution of previous large right-sided pneumothorax  with chest tube in place.  2. COPD.   3. Nodular opacity right upper lobe    I have personally reviewed the examination and initial interpretation  and I agree with the findings.    JOSE NAGEL MD     Medications       baclofen  10 mg Oral BID     diazepam  10 mg Oral QAM     diazepam  5 mg Oral QPM     DULoxetine  60 mg Oral QAM     gabapentin  1,200 mg Oral Q8H     lidocaine  2 patch Transdermal Q24h    And     lidocaine   Transdermal Q24H    And     lidocaine   Transdermal Q8H     nicotine  1 patch Transdermal Daily     nicotine   Transdermal Q8H     nicotine   Transdermal Daily     omeprazole  20 mg Oral QAM     sodium chloride (PF)  3 mL Intracatheter Q8H     sodium chloride (PF)  3 mL Intracatheter Q8H

## 2019-08-14 NOTE — CONSULTS
[facility]  Pulmonary Consult     Patient:  Julia Boudreaux, Date of birth 1965, Medical record number [id number]  Date of Visit:  08/14/2019    Reason for Consult: Chest tube management         Assessment and Recommendations:     Julia Boudreaux is a 54 year old female w/ a PMH of lumbar degenerative disc disease s/p 2 surgeries, tobacco use, and MDD who presented for an elective IR-guided percutaneous biopsy of a RUL nodule on 8/12, c/b by right sided pneumthorax.    Iatrogenic Right Sided Pneumothorax  Occurred after RUL nodule biopsy, chest tube placed by IR 8/12. Lung initially re-expanded, but then PTX found to increase in size this morning. Chest tube was found kinked externally, air leak was obtained after unkinking of chest tube.  --Keep chest tube to suction today.  --Obtain CXR at 5AM, if no pneumothorax and no air leak, then place chest tube to waterseal(this can be done by the medicine team) and repeat CXR two hours later. May be able to clamp chest tube in timely manner if above is done.    Thank you very much for this consultation. Pulmonary will continue to follow.    Patient staffed with Dr. Nolen.    Rylee Michel  Pulmonary and Critical Care Fellow  [id number]        History of Present Illness     Julia Boudreaux is a 54 year old female w/ a PMH of lumbar degenerative disc disease s/p 2 surgeries, tobacco use, and MDD who presented for an elective IR-guided percutaneous biopsy of a RUL nodule on 8/12.     In the PACU, she became hypoxic and CXR revealed a moderate right sided pneumothorax. A chest tube was placed and the patient was admitted to medicine.     Pulmonary was consulted for management of chest tube.     Review of Systems:  10 point ROS completed and negative except for above.    Past Medical History:   Diagnosis Date     'light-for-dates' infant with signs of fetal malnutrition      Abdominal aortic aneurysm (H)      Abnormal Papanicolaou smear of cervix and  cervical HPV     Abn. 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 2/23/13    Hospitalized       Past Surgical History:   Procedure Laterality Date     BLADDER SURGERY  2010     C COMBINED ANT/POST COLPORRHAPHY  02/22/13     C LIGATE FALLOPIAN TUBE       CHOLECYSTECTOMY, LAPOROSCOPIC  08/27/2014     chondrectomy of the spine  03/01/2008    discectomy     HAND SURGERY  2016     IR CHEST TUBE PLACEMENT NON-TUNNELED RIGHT  8/12/2019     laminotomy for decompression and exploration  03/22/2016     rw back  11-1-12;11-15-12    2 nerves cut and cauterized in low back     ANDRES and BSO  2002    bleeding       Family History   Problem Relation Age of Onset     Diabetes Father         ETOH abuse     Cardiovascular Maternal Grandmother         stroke     Alzheimer Disease No family hx of      Cancer No family hx of         breast, colon, uterine, ovarian     Heart Disease No family hx of      Thyroid Disease No family hx of      Anesthesia Reaction No family hx of        Social History     Social History Narrative    Food manufacturing and dietary work, variety of manual labor, unloading      Social History     Tobacco Use     Smoking status: Current Every Day Smoker     Packs/day: 1.00     Years: 41.00     Pack years: 41.00     Smokeless tobacco: Never Used     Tobacco comment: declines quit line 2-12-13   Substance Use Topics     Alcohol use: Yes     Comment: rare     Drug use: No            Current Medications & Allergies:       baclofen  10 mg Oral BID     diazepam  10 mg Oral QAM     diazepam  5 mg Oral QPM     DULoxetine  60 mg Oral QAM     gabapentin  1,200 mg Oral Q8H     lidocaine  2 patch Transdermal Q24h    And     lidocaine   Transdermal Q24H    And      lidocaine   Transdermal Q8H     nicotine  1 patch Transdermal Daily     nicotine   Transdermal Q8H     nicotine   Transdermal Daily     omeprazole  20 mg Oral QAM     polyethylene glycol  17 g Oral Daily     sodium chloride (PF)  3 mL Intracatheter Q8H     sodium chloride (PF)  3 mL Intracatheter Q8H       Infusions/Drips:      Allergies   Allergen Reactions     Latex Rash     Liquid Adhesive Rash            Physical Exam:   Ranges for vital signs:  Temp:  [97.1  F (36.2  C)-98.9  F (37.2  C)] 98.7  F (37.1  C)  Pulse:  [66] 66  Heart Rate:  [66-70] 69  Resp:  [18] 18  BP: (102-133)/(61-75) 131/75  SpO2:  [95 %-100 %] 95 %  Vitals:    08/12/19 1000 08/12/19 1757 08/13/19 1845   Weight: 48.5 kg (107 lb) 49.4 kg (108 lb 14.5 oz) 52 kg (114 lb 10.2 oz)       Physical Examination:  GENERAL:  Lying in bed, no acute distress  HEAD:  Head is normocephalic, atraumatic   EYES:  Eyes have anicteric sclerae   LUNGS:  Clear to auscultation bilaterally. Chest tube in right upper chest. No air leak initially, but air leak noted with untwisting of chest tube externally.  CARDIOVASCULAR:  Regular rate and rhythm   ABDOMEN:  Soft, nontender, nondistended  EXT:  No edema.    NEUROLOGIC:  Awake and alert, answering questions appropriately.  PSYCH: Normal affect.         Laboratory Data:     Metabolic Studies       Recent Labs   Lab Test 08/13/19  0555 08/01/19  1505    136   POTASSIUM 4.6 4.3   CHLORIDE 102 103   CO2 33* 32   ANIONGAP 2* 1*   BUN 7 8   CR 0.51* 0.67   GFRESTIMATED >90 >90   GLC 97 80   LARRY 9.4 8.9       Hematology Studies      Recent Labs   Lab Test 08/13/19  0555 08/01/19  1505  02/12/13  1145   WBC 9.8 11.3*  --  9.3   HGB 13.8 14.8   < > 14.3   HCT 43.9 45.3  --  43.1    249  --  288    < > = values in this interval not displayed.       Clotting Studies    Recent Labs   Lab Test 08/01/19  1505   INR 0.96       Urine Studies     Recent Labs   Lab Test 01/23/13  1255 11/07/11  1520   URINEPH 6.5 6.5    NITRITE Negative Negative   LEUKEST Negative Negative   WBCU 1 <1       Microbiology:  Last 6 Culture results with specimen source  Culture Micro   Date Value Ref Range Status   01/23/2013   Final    <10,000 colonies/mL Multiple species present, probable perineal contamination.    Specimen Description   Date Value Ref Range Status   01/23/2013 Midstream Urine  Final        Imaging:  Recent Results (from the past 48 hour(s))   XR Chest Port 1 View    Narrative    Exam: XR CHEST PORT 1 VW, 8/12/2019 6:46 PM    Indication: follow up pneumothorax    Comparison: 8/12/2019 at 1308    Findings:   AP view of the chest. Interval placement of a right apical chest tube.  Significantly improved right-sided pneumothorax with trace residual  air at the right apex. No large pleural effusion, however the left  costophrenic angle is collimated out of view. Lung volumes remain  elevated with prominent interstitial markings. No new focal  consolidation. Cardiac silhouette is within normal limits. Limited  visualization of the upper abdomen is unremarkable. Degenerative  changes of the spine and shoulders. No acute osseous findings.      Impression    Impression:   Significantly improved right-sided pneumothorax status post chest tube  placement.    I have personally reviewed the examination and initial interpretation  and I agree with the findings.    KAITLIN SCHMIDT MD   XR Chest Port 1 View    Narrative    Exam: XR CHEST PORT 1 VW, 8/13/2019 3:48 AM    Indication: chest tube placed, now with air leak    Comparison: 8/12/2019    Findings:   AP radiograph of the chest. Revisualization of apical right-sided  chest tube. Cardiac silhouette is not enlarged. High lung volumes. No  new focal airspace opacities. Right costophrenic angles collimator on  the field-of-view. No left pleural effusion. Small residual 3 mm right  apical pneumothorax. No left pneumothorax.      Impression    Impression: Small residual right apical pneumothorax  with unchanged  right apical chest tube.    I have personally reviewed the examination and initial interpretation  and I agree with the findings.    WENDY BRADFORD MD   XR Chest Port 1 View   Result Value    Radiologist flags Acute large pneumothorax (Urgent)    Narrative    TEMPORARY  8/13/2019 11:21 AM      HISTORY: eval R pneumo    COMPARISON: Chest x-ray from earlier today    FINDINGS:   Single AP chest radiograph demonstrates marked increase in right  thoracic subcutaneous emphysema and a large right pneumothorax causing  adjacent hilar atelectasis. The right-sided pigtail catheter sideholes  appear to be intrapleural, although unable to verify on a single view.  The remainder of the lung is unremarkable.      Impression    IMPRESSION:     1. New large right pneumothorax causing adjacent mass effect on the  right hilum with right apical pigtail catheter present. Recommend  urgent placement of chest tube back to suction.  2. Right thoracic subcutaneous emphysema consistent with air leak.  Malpositioning of chest tube sidehole is difficult to exclude on the  single image.    [Urgent Result: Acute large pneumothorax]    Finding was identified on 8/13/2019 11:21 AM.     Dr. Bessie Parekh  was contacted by Dr. Arias at 8/13/2019 11:31 AM and  verbalized understanding of the urgent finding.      I have personally reviewed the examination and initial interpretation  and I agree with the findings.    KAITLIN SCHMIDT MD   XR Chest Port 1 View    Narrative    XR CHEST PORT 1 VW  8/13/2019 5:26 PM      HISTORY: eval for resolution of r pneumo w/ chest tube back on suction    COMPARISON: Chest x-ray same day 1045 hours    TECHNIQUE: Semiupright frontal view of the chest    FINDINGS: Near complete resolution of the large right-sided  pneumothorax with anterior approach chest tube in place with tip  projecting over the right upper lobe. Spiculated nodular opacity to  the right of the T3 vertebral body measuring 1.5 cm.  Lungs are  hyperinflated with splaying of the vasculature. Left costophrenic  angle is clipped from field-of-view. No pleural effusion. Cardiac  mediastinal silhouette is within normal limits. Trachea is midline.  The upper abdomen is unremarkable. Moderate subcutaneous emphysema  along the right lateral chest wall and suprapubic space.      Impression    IMPRESSION:   1. Near complete resolution of previous large right-sided pneumothorax  with chest tube in place.  2. COPD.   3. Nodular opacity right upper lobe    I have personally reviewed the examination and initial interpretation  and I agree with the findings.    JOSE NAGEL MD   XR Chest Port 1 View    Narrative    Exam: XR CHEST PORT 1 VW, 8/14/2019 9:42 AM    Indication: eval R pneumothorax w/ chest tube    Comparison: Yesterday    Findings:   New right pneumothorax, chest tube is in place. Amount of subcutaneous  air appears similar. Heart within normal limits. No focal airspace  opacities identified. Change in the appearance of the chest tube  likely related to the pneumothorax.      Impression    Impression: Recurrent pneumothorax on the right. Chest tube is in  place.    JOSE NAGEL MD   XR Chest Port 1 View    Narrative    XR CHEST PORT 1 VW  8/14/2019 3:01 PM      HISTORY: eval for pneumo after repositioning chest tube IR bx right  lung nodule 8/12/2019    COMPARISON: Chest x-ray 8/14/2019 comment chest CT 8/12/2019.    FINDINGS:   AP radiograph of the chest. Right chest tube in stable position  projecting apically.    Cardiomediastinal silhouette within normal limits. Pulmonary  vasculature is distinct. Trachea is midline. High lung volumes. Stable  emphysematous changes. No pneumothorax appreciated. No pleural  effusion or focal air space opacities. Stable extensive right sided  subcutaneous emphysema. Redemonstration of right upper lobe nodular  opacity at biopsy site.      Impression    IMPRESSION:   1. Resolution of previously demonstrated  large right pneumothorax  8/12/2019. Right chest tube in stable position.  2. Grossly unchanged right upper lung field nodular opacity at biopsy  site.

## 2019-08-14 NOTE — PLAN OF CARE
Pt A&Ox4. VSS on RA w/ O2 available. Pt up independently to bedside commode w/ some assistance due to chest tube. Pt educated on importance of not twisting tube and to assess for kinks due to large kink untwisted during shift. No bubbles present in C chamber.     Voiding adequately. PIV saline locked. Pt c/o pain minimally to moderately relived by q3 IV dilaudid. Pt declines oral oxycodone due to lack of effectiveness and team plans to discuss pain management tomorrow. Suction remains at -40mmHg due to large pneumothorax. Crepitus noted on R side. Stat chest x-ray scheduled w/ IR to follow up on need for tube repositioning.      Continue with plan of care.

## 2019-08-15 ENCOUNTER — APPOINTMENT (OUTPATIENT)
Dept: GENERAL RADIOLOGY | Facility: CLINIC | Age: 54
DRG: 200 | End: 2019-08-15
Attending: RADIOLOGY
Payer: MEDICARE

## 2019-08-15 ENCOUNTER — APPOINTMENT (OUTPATIENT)
Dept: GENERAL RADIOLOGY | Facility: CLINIC | Age: 54
DRG: 200 | End: 2019-08-15
Attending: STUDENT IN AN ORGANIZED HEALTH CARE EDUCATION/TRAINING PROGRAM
Payer: MEDICARE

## 2019-08-15 VITALS
TEMPERATURE: 97.3 F | HEART RATE: 66 BPM | RESPIRATION RATE: 16 BRPM | SYSTOLIC BLOOD PRESSURE: 118 MMHG | DIASTOLIC BLOOD PRESSURE: 62 MMHG | OXYGEN SATURATION: 94 % | BODY MASS INDEX: 17.16 KG/M2 | HEIGHT: 65 IN | WEIGHT: 103 LBS

## 2019-08-15 LAB
ANION GAP SERPL CALCULATED.3IONS-SCNC: 2 MMOL/L (ref 3–14)
BUN SERPL-MCNC: 10 MG/DL (ref 7–30)
CALCIUM SERPL-MCNC: 9.3 MG/DL (ref 8.5–10.1)
CHLORIDE SERPL-SCNC: 100 MMOL/L (ref 94–109)
CO2 SERPL-SCNC: 33 MMOL/L (ref 20–32)
CREAT SERPL-MCNC: 0.56 MG/DL (ref 0.52–1.04)
ERYTHROCYTE [DISTWIDTH] IN BLOOD BY AUTOMATED COUNT: 12.1 % (ref 10–15)
GFR SERPL CREATININE-BSD FRML MDRD: >90 ML/MIN/{1.73_M2}
GLUCOSE SERPL-MCNC: 100 MG/DL (ref 70–99)
HCT VFR BLD AUTO: 44.2 % (ref 35–47)
HGB BLD-MCNC: 13.9 G/DL (ref 11.7–15.7)
MCH RBC QN AUTO: 33.7 PG (ref 26.5–33)
MCHC RBC AUTO-ENTMCNC: 31.4 G/DL (ref 31.5–36.5)
MCV RBC AUTO: 107 FL (ref 78–100)
PLATELET # BLD AUTO: 221 10E9/L (ref 150–450)
POTASSIUM SERPL-SCNC: 4.2 MMOL/L (ref 3.4–5.3)
RBC # BLD AUTO: 4.12 10E12/L (ref 3.8–5.2)
SODIUM SERPL-SCNC: 135 MMOL/L (ref 133–144)
WBC # BLD AUTO: 8.3 10E9/L (ref 4–11)

## 2019-08-15 PROCEDURE — 71045 X-RAY EXAM CHEST 1 VIEW: CPT | Mod: 76

## 2019-08-15 PROCEDURE — 71045 X-RAY EXAM CHEST 1 VIEW: CPT | Mod: 77

## 2019-08-15 PROCEDURE — 25000132 ZZH RX MED GY IP 250 OP 250 PS 637: Mod: GY | Performed by: STUDENT IN AN ORGANIZED HEALTH CARE EDUCATION/TRAINING PROGRAM

## 2019-08-15 PROCEDURE — 36415 COLL VENOUS BLD VENIPUNCTURE: CPT | Performed by: STUDENT IN AN ORGANIZED HEALTH CARE EDUCATION/TRAINING PROGRAM

## 2019-08-15 PROCEDURE — 85027 COMPLETE CBC AUTOMATED: CPT | Performed by: STUDENT IN AN ORGANIZED HEALTH CARE EDUCATION/TRAINING PROGRAM

## 2019-08-15 PROCEDURE — 99239 HOSP IP/OBS DSCHRG MGMT >30: CPT | Mod: GC | Performed by: INTERNAL MEDICINE

## 2019-08-15 PROCEDURE — 71045 X-RAY EXAM CHEST 1 VIEW: CPT

## 2019-08-15 PROCEDURE — 25000128 H RX IP 250 OP 636: Performed by: STUDENT IN AN ORGANIZED HEALTH CARE EDUCATION/TRAINING PROGRAM

## 2019-08-15 PROCEDURE — 80048 BASIC METABOLIC PNL TOTAL CA: CPT | Performed by: STUDENT IN AN ORGANIZED HEALTH CARE EDUCATION/TRAINING PROGRAM

## 2019-08-15 RX ORDER — HYDROMORPHONE HYDROCHLORIDE 1 MG/ML
.5-1 INJECTION, SOLUTION INTRAMUSCULAR; INTRAVENOUS; SUBCUTANEOUS
Status: DISCONTINUED | OUTPATIENT
Start: 2019-08-15 | End: 2019-08-15 | Stop reason: HOSPADM

## 2019-08-15 RX ADMIN — GABAPENTIN 1200 MG: 600 TABLET, FILM COATED ORAL at 07:44

## 2019-08-15 RX ADMIN — HYDROMORPHONE HYDROCHLORIDE 1 MG: 1 INJECTION, SOLUTION INTRAMUSCULAR; INTRAVENOUS; SUBCUTANEOUS at 11:53

## 2019-08-15 RX ADMIN — GABAPENTIN 1200 MG: 600 TABLET, FILM COATED ORAL at 02:37

## 2019-08-15 RX ADMIN — DIAZEPAM 10 MG: 5 TABLET ORAL at 07:44

## 2019-08-15 RX ADMIN — OXYCODONE HYDROCHLORIDE 5 MG: 5 TABLET ORAL at 04:48

## 2019-08-15 RX ADMIN — HYDROMORPHONE HYDROCHLORIDE 1 MG: 1 INJECTION, SOLUTION INTRAMUSCULAR; INTRAVENOUS; SUBCUTANEOUS at 08:24

## 2019-08-15 RX ADMIN — BACLOFEN 10 MG: 10 TABLET ORAL at 07:44

## 2019-08-15 RX ADMIN — DULOXETINE HYDROCHLORIDE 60 MG: 60 CAPSULE, DELAYED RELEASE ORAL at 07:44

## 2019-08-15 RX ADMIN — GABAPENTIN 1200 MG: 600 TABLET, FILM COATED ORAL at 16:02

## 2019-08-15 RX ADMIN — OMEPRAZOLE 20 MG: 20 CAPSULE, DELAYED RELEASE ORAL at 07:44

## 2019-08-15 RX ADMIN — HYDROMORPHONE HYDROCHLORIDE 1 MG: 1 INJECTION, SOLUTION INTRAMUSCULAR; INTRAVENOUS; SUBCUTANEOUS at 14:55

## 2019-08-15 RX ADMIN — HYDROMORPHONE HYDROCHLORIDE 1 MG: 1 INJECTION, SOLUTION INTRAMUSCULAR; INTRAVENOUS; SUBCUTANEOUS at 02:38

## 2019-08-15 ASSESSMENT — ACTIVITIES OF DAILY LIVING (ADL)
ADLS_ACUITY_SCORE: 15

## 2019-08-15 NOTE — PLAN OF CARE
"BP 94/55 (BP Location: Left arm)   Pulse 66   Temp 97.8  F (36.6  C) (Oral)   Resp 16   Ht 1.651 m (5' 5\")   Wt 46.7 kg (103 lb)   SpO2 100%   BMI 17.14 kg/m  Full code. Alert/oriented x3. Pt reports that although pain is getting more under control, she definitely worries about how she will manage when she returns home. She endorses her struggles with chronic pain management and [place] and [place] appointments/getting pain management under control in the past. She would benefit from referral from pain team prior to discharge to determine most effective combination of pain medications to treat her pain as she transitions to go home. At this juncture, she endorses that the dilaudid IV 1mg is most effective with managing her both her chest pain as well as chronic back pain. She states that the 5mg of oxycodone alone is not effective with pain control. She's been trying to stretch out her length of time between taking IV pain medication, but then she reports that the subcutaneous tissue is painful. She endorses frustration with dealing with ongoing chronic orthopedic pain and wanting to find a chronic pain team and now the overlap of the recent biopsy awaiting results only to have this new struggle with the pneumothorax on top of it all. She would like to have some help with coordinating her pain management as she tries to transition to going home and she is fearful that she will be in pain and would like an pain  to review her current diagnoses,medications, and give recommendations as to what combination she should take to taper for  Home. Current combination of  Gabapentin, baclofen, valium, cymbalta, lidocaine,  and zofran   Have been used in various combinations. At present, transitioning off Dilaudid IV would be helpful but first she would benefit from consultation with pain team to go over medications.  She is open to going to " complex care clinic and/or pain clinic as well.  Updated day shift RN.  Minimal output from Chest tube set at -40 H20 suction. At 25. Output. At end of shift. Pt continues to endorse pain at site of insertion and shoulder on right side. She is able to get on and off bed to use bedside commode.  Adequate urine output.  Paged portable xray  And they indicated that orders were not specific for time this am and they completed xray when they got up to floor. See results. Reviewed with day shift RN and she will follow up with patient/MD.  Pt currently sleeping at shift change. Plan:  Hourly rounding complete, call light  In reach.  Continue to monitor and follow plan of care. Notify MD of changes.

## 2019-08-15 NOTE — PROGRESS NOTES
Focus: Discharge  Pt with orders to discharge home this evening. Chest tube removed without complication. Respiratory status stable. Dressing to site C/D/I. Post-chest tube removal instructions reviewed and handout provided. PIV removed. No scripts to fill. All belongings packed by pt. Medications from security returned. Pt to follow up as noted in discharge instructions. Pt left unit 5B via WC with family.

## 2019-08-15 NOTE — DISCHARGE SUMMARY
[facility], [place]  Discharge Summary - Medicine & Pediatrics       Date of Admission:  8/12/2019  Date of Discharge:  8/15/2019  5:05 PM  Discharging Provider: Dr. Chaidez  Discharge Service: Arsen Troy    Discharge Diagnoses   - Iatrogenic pneumothorax  - Tobacco use  - Lung adenocarcinoma     Follow-ups Needed After Discharge   Follow-up Appointments     Adult [facility] Follow-up and recommended labs and tests      Follow up with primary care provider, Aggie Lee, within 7   days for hospital follow- up and regarding new diagnosis.  No follow up   labs or test are needed.      Appointments on [place] and/or [facility] (with [facility] or [facility]   provider or service). Call [phone] if you haven't heard regarding   these appointments within 7 days of discharge.             Unresulted Labs Ordered in the Past 30 Days of this Admission     Date and Time Order Name Status Description    8/15/2019 1033 NGS Fusion Profile Assay In process     8/15/2019 1033 Lung Carcinoma Expanded NGS Panel In process       These results will be followed up by PCP/outpatient oncology team.    Discharge Disposition   Discharged to home  Condition at discharge: Stable    Hospital Course   Julia Boudreaux was admitted on 8/12/2019 after her RUL lung bx was c/b iatrogenic pneumothorax.  The following problems were addressed during her hospitalization:    #Iatrogenic pneumothorax  #Tobacco use  #Lung adenocarcinoma   The patient had a significant amount of weight loss PTA. Her PCP ordered a CT/PET which revealed a 2.3 cm hypermetabolic right upper lobe nodule with central cavitation c/f malignancy (patient actively smoking). She presented for a bx w/ IR, post-op course c/b SOB, found to have a large R pneumo. IR placed a chest tube and she was admitted to medicine. Clamping trial was attempted the next day w/ re-accumulation of the pneumo. Pulm was consulted for assistance, and they were able to  re-position/unkink the chest tube. The chest tube was removed w/o incident the following day.     This admission, pathology from the lung bx revealed adenocarcinoma which was discussed w/ the patient. She elected to follow-up with her PCP for a referral to an oncology team closer to where she lives rather than being set up w/ oncology here.   - PCP f/u within three days     Consultations This Hospital Stay   INTERVENTIONAL RADIOLOGY ADULT/PEDS IP CONSULT  VASCULAR ACCESS CARE ADULT IP CONSULT  PULMONARY GENERAL ADULT IP CONSULT    Code Status   Full Code     The patient was discussed with Dr. Kaylynn Parekh MD  [place] [address] Service  [facility]  Pager: [phone]  ______________________________________________________________________    Physical Exam   Vital Signs: Temp: 97.3  F (36.3  C) Temp src: Oral BP: 118/62   Heart Rate: 85 Resp: 16 SpO2: 94 % O2 Device: None (Room air)    Weight: 103 lbs 0 oz  General Appearance: NAD, on RA   Respiratory: wheezing on the R side, chest tube and bx sites/bandages appear CDI,    Cardiovascular: RRR, no m/r/g  GI: soft, non tender, non distended   Musculoskeletal: warm and well-perfused, no edema   Neurologic: alert and oriented, no focal deficits, fluent speech       Primary Care Physician   Aggie Lee    Discharge Orders      Reason for your hospital stay    You were admitted for a biopsy of a lung nodule,     Adult [facility] Follow-up and recommended labs and tests    Follow up with primary care provider, Aggie Lee, within 7 days for hospital follow- up and regarding new diagnosis.  No follow up labs or test are needed.      Appointments on [place] and/or [facility] (with [facility] or [facility] provider or service). Call [phone] if you haven't heard regarding these appointments within 7 days of discharge.     Activity    Your activity upon discharge: activity as tolerated     Discharge Instructions    - I  have called Dr. Us's office and let her nurse know about your new diagnosis, their office will reach out to you to schedule an appointment to be seen on Monday     Full Code     Rolling Knee Walker Order    I, the undersigned, certify that the above prescribed supplies are medically necessary for this patient and is both reasonable and necessary in reference to accepted standards of medical and necessary in reference to accepted standards of medical practice in the treatment of this patient's condition and is not prescribed as a convenience.      Bath Seat Order    I, the undersigned, certify that the above prescribed supplies are medically necessary for this patient and is both reasonable and necessary in reference to accepted standards of medical and necessary in reference to accepted standards of medical practice in the treatment of this patient's condition and is not prescribed as a convenience.      Diet    Follow this diet upon discharge: Orders Placed This Encounter      Room Service      Snacks/Supplements Adult: Boost Plus; Between Meals      Regular Diet Adult       Significant Results and Procedures   Results for orders placed or performed during the hospital encounter of 08/12/19   CT Lung Mediastinum Biopsy    Narrative    PROCEDURES :  1. CT guided right upper lobe percutaneous lung lesion biopsy.     Clinical History: RUL Lung lesion biopsy requested.    Comparisons: Whole-body PET/CT 7/18/2019    Staff Radiologist: Dean Gaming MD    Fellow: Tyree Ochoa MD    Procedure performed by Dr. Ochoa under my supervision. I, Dr. Dean Gaming, was present for the entire procedure.    Medications:     Face to face sedation time: 15 minutes    Abnormal Versed administered for sedation.    The patient was placed on continuous monitoring. Intravenous sedation  was administered.  Vital signs and sedation monitored by nursing staff  under Interventional Radiologists supervision. The  patient remained  stable throughout the procedure.    Dose: 458 mGycm.    PROCEDURE: The patient understood the limitations, alternatives, and  risks of the procedure and requested the procedure be performed. Both  written and oral consent were obtained.    Limited pre-procedural scan performed demonstrated adequate lesion  size and position for biopsy.    The right posterior chest was prepped and draped in the usual sterile  fashion. Ten to one volume mixture of 1% lidocaine without epinephrine  buffered with 8.4% bicarbonate solution was used for local anesthesia.      Under CT guidance, a 19 gauge coaxial introducer needle was advanced  into the right upper lobe lung lesion. Four 20 gauge core biopsies  were obtained with a [employer] biopsy gun and submitted to Pathology  who indicated that adequate specimen was obtained.    Needles removed. Sterile airtight dressing applied.    Limited post-procedural scan demonstrated moderate pneumothorax.       Impression    IMPRESSION:   1. Four 20 gauge core biopsies obtained of the right upper lobe lesion  under CT-guidance.  2. Moderate pneumothorax. Plan for stat chest x-ray, possible chest  tube placement.    I have personally reviewed the examination and initial interpretation  and I agree with the findings.    AKSHAT PAYNE   XR Chest 1 View     Value    Radiologist flags Large right pneumothorax (Urgent)    Narrative    XR CHEST 1 VW  8/12/2019 1:09 PM      HISTORY: ptx    COMPARISON: Chest CT same day, chest x-ray 3/18/2010.    FINDINGS:   AP radiograph of the chest. Cardiac mediastinal silhouette within  normal limits. Pulmonary vasculature is distinct. High lung volumes  without discrete focal airspace opacities or pleural effusions. Large  right pneumothorax. No pneumothorax appreciated on the left.      Impression    IMPRESSION:   Large right pneumothorax status post IR procedure. No pneumothorax  appreciated on the left.    [Urgent Result: Large right  pneumothorax]    Finding was identified on 8/12/2019 1:32 PM.     KESHAWN Isidro was contacted by Dr. Morales at 8/12/2019 1:37 PM and  verbalized understanding of the urgent finding.     I have personally reviewed the examination and initial interpretation  and I agree with the findings.    KAITLIN SCHMIDT MD   IR Chest Tube Place Non Tunneled Right    Narrative    Procedure 8/12/2019:  Fluoroscopic guided right chest tube placement    History: Post pulmonary nodule pneumothorax.    Comparison: Chest x-ray performed the same day    Operators: Sravanthi YO, Dr. Akshat Payne, performed the entire procedure with the  assistance of Dr. Fabian.    Medications:     No intravenous sedation administered, local analgesia only. Vital  signs and oxygenation continuously monitored. The patient remained  stable throughout the procedure.    Findings/procedure:    Prior to the procedure, both verbal and written informed consent  obtained from the patient. The patient placed supine on the  fluoroscopy table. The right upper anterior chest prepped and draped.  Timeout performed.     Buffered 1% Lidocaine used for local analgesia. Under ultrasound  guidance, a 5F Yueh centesis catheter with needle stylette advanced  between the second and third ribs at approximately the mid clavicular  line while gently aspirating a slip tip syringe. When air aspirated,  the catheter advanced off the stylette.    14 Faroese nonlocking Flexima J-tip advanced over a wire into the  pleural space. Approximately 700 cc aspirated and the pneumothorax  reduced.     Catheter secured to the skin with a 2-0 ethilon suture. Dressing  applied. The patient tolerated the procedure well without immediate  complication.       Impression    Impression:  Uncomplicated fluoroscopic guided right chest tube placement for  treatment of pneumothorax.     Plan:   20 cm H20 suction. Evaluate for resolution of air leak.     AKSHAT PAYNE   XR Chest Port 1  View    Narrative    Exam: XR CHEST PORT 1 VW, 8/12/2019 6:46 PM    Indication: follow up pneumothorax    Comparison: 8/12/2019 at 1308    Findings:   AP view of the chest. Interval placement of a right apical chest tube.  Significantly improved right-sided pneumothorax with trace residual  air at the right apex. No large pleural effusion, however the left  costophrenic angle is collimated out of view. Lung volumes remain  elevated with prominent interstitial markings. No new focal  consolidation. Cardiac silhouette is within normal limits. Limited  visualization of the upper abdomen is unremarkable. Degenerative  changes of the spine and shoulders. No acute osseous findings.      Impression    Impression:   Significantly improved right-sided pneumothorax status post chest tube  placement.    I have personally reviewed the examination and initial interpretation  and I agree with the findings.    KAITLIN SCHMIDT MD   XR Chest Port 1 View    Narrative    Exam: XR CHEST PORT 1 VW, 8/13/2019 3:48 AM    Indication: chest tube placed, now with air leak    Comparison: 8/12/2019    Findings:   AP radiograph of the chest. Revisualization of apical right-sided  chest tube. Cardiac silhouette is not enlarged. High lung volumes. No  new focal airspace opacities. Right costophrenic angles collimator on  the field-of-view. No left pleural effusion. Small residual 3 mm right  apical pneumothorax. No left pneumothorax.      Impression    Impression: Small residual right apical pneumothorax with unchanged  right apical chest tube.    I have personally reviewed the examination and initial interpretation  and I agree with the findings.    WENDY BRADFORD MD   XR Chest Port 1 View     Value    Radiologist flags Acute large pneumothorax (Urgent)    Narrative    TEMPORARY  8/13/2019 11:21 AM      HISTORY: eval R pneumo    COMPARISON: Chest x-ray from earlier today    FINDINGS:   Single AP chest radiograph demonstrates marked increase  in right  thoracic subcutaneous emphysema and a large right pneumothorax causing  adjacent hilar atelectasis. The right-sided pigtail catheter sideholes  appear to be intrapleural, although unable to verify on a single view.  The remainder of the lung is unremarkable.      Impression    IMPRESSION:     1. New large right pneumothorax causing adjacent mass effect on the  right hilum with right apical pigtail catheter present. Recommend  urgent placement of chest tube back to suction.  2. Right thoracic subcutaneous emphysema consistent with air leak.  Malpositioning of chest tube sidehole is difficult to exclude on the  single image.    [Urgent Result: Acute large pneumothorax]    Finding was identified on 8/13/2019 11:21 AM.     Dr. Bessie Parekh  was contacted by Dr. Arias at 8/13/2019 11:31 AM and  verbalized understanding of the urgent finding.      I have personally reviewed the examination and initial interpretation  and I agree with the findings.    KAITLIN SCHMIDT MD   XR Chest Port 1 View    Narrative    XR CHEST PORT 1 VW  8/13/2019 5:26 PM      HISTORY: eval for resolution of r pneumo w/ chest tube back on suction    COMPARISON: Chest x-ray same day 1045 hours    TECHNIQUE: Semiupright frontal view of the chest    FINDINGS: Near complete resolution of the large right-sided  pneumothorax with anterior approach chest tube in place with tip  projecting over the right upper lobe. Spiculated nodular opacity to  the right of the T3 vertebral body measuring 1.5 cm. Lungs are  hyperinflated with splaying of the vasculature. Left costophrenic  angle is clipped from field-of-view. No pleural effusion. Cardiac  mediastinal silhouette is within normal limits. Trachea is midline.  The upper abdomen is unremarkable. Moderate subcutaneous emphysema  along the right lateral chest wall and suprapubic space.      Impression    IMPRESSION:   1. Near complete resolution of previous large right-sided pneumothorax  with chest  tube in place.  2. COPD.   3. Nodular opacity right upper lobe    I have personally reviewed the examination and initial interpretation  and I agree with the findings.    JOSE NAGEL MD   XR Chest Port 1 View    Narrative    Exam: XR CHEST PORT 1 VW, 8/14/2019 9:42 AM    Indication: eval R pneumothorax w/ chest tube    Comparison: Yesterday    Findings:   New right pneumothorax, chest tube is in place. Amount of subcutaneous  air appears similar. Heart within normal limits. No focal airspace  opacities identified. Change in the appearance of the chest tube  likely related to the pneumothorax.      Impression    Impression: Recurrent pneumothorax on the right. Chest tube is in  place.    JOSE NAGEL MD   XR Chest Port 1 View    Narrative    XR CHEST PORT 1 VW  8/14/2019 3:01 PM      HISTORY: eval for pneumo after repositioning chest tube IR bx right  lung nodule 8/12/2019    COMPARISON: Chest x-ray 8/14/2019 comment chest CT 8/12/2019.    FINDINGS:   AP radiograph of the chest. Right chest tube in stable position  projecting apically.    Cardiomediastinal silhouette within normal limits. Pulmonary  vasculature is distinct. Trachea is midline. Stable emphysematous  changes. No pneumothorax appreciated. No pleural effusion or focal air  space opacities. Stable extensive right sided subcutaneous emphysema.  Redemonstration of right upper lobe nodular opacity at biopsy site.      Impression    IMPRESSION:   1. Resolution of previously demonstrated large right pneumothorax  8/12/2019. Right chest tube in stable position.  2. Grossly unchanged right upper lung field nodular opacity.    I have personally reviewed the examination and initial interpretation  and I agree with the findings.    JOSE NAGEL MD   XR Chest Port 1 View    Narrative    Exam: XR CHEST PORT 1 VW, 8/15/2019 6:10 AM    Indication: eval pneumo    Comparison: 8/14/2019    Findings:   AP radiograph of the chest. Unchanged right apical chest tube.  Cardiac  silhouette is unchanged. No new focal airspace opacities. Unchanged  subcutaneous emphysema along the right chest wall extending into the  right neck. No pneumothorax is seen. No pleural effusion.      Impression    Impression:   1. No appreciable pneumothorax. Unchanged right apical chest tube.  2. Unchanged subcutaneous emphysema along the right chest wall.    I have personally reviewed the examination and initial interpretation  and I agree with the findings.    VERONICA MCCRACKEN MD   XR Chest Port 1 View    Narrative    XR CHEST PORT 1 VW  8/15/2019 9:59 AM      HISTORY: eval pneumo w/ chest tube on water seal    COMPARISON: Chest x-ray 8/15/2019, 8/12/2019, chest CT 8/12/2019    FINDINGS:   . AP radiograph of the chest. Right chest tube in stable position.    Cardiac and mediastinal silhouette within normal limits. Pulmonary  vasculature is distinct. Trachea is midline. High lung volumes.  Decreased right upper lobe nodular opacity at biopsy site. No new  focal airspace opacities, pleural effusion, or pneumothorax  appreciated. Stable right chest wall subcutaneous emphysema.      Impression    IMPRESSION:   1. No pneumothorax appreciated. Right chest tube in stable position.  2. Stable right chest wall subcutaneous emphysema.    I have personally reviewed the examination and initial interpretation  and I agree with the findings.    KAITLIN SCHMIDT MD   XR Chest Port 1 View    Narrative    XR CHEST PORT 1 VW  8/15/2019 3:02 PM      HISTORY: Evaluate R sided pneumothorax after clamp trial    COMPARISON: Chest x-ray same day    FINDINGS:   AP radiograph of the chest. Right chest tube in stable position.    Cardiomediastinal silhouette within normal limits. Pulmonary  vasculature is distinct. Hilum volumes. No new focal airspace  opacities, pleural effusion, or pneumothorax appreciated. Slightly  decreased right chest wall subcutaneous emphysema.      Impression    IMPRESSION:   1. No pneumothorax appreciated.  Right chest tube in stable position.  2. Decreased right chest wall subcutaneous emphysema.    I have personally reviewed the examination and initial interpretation  and I agree with the findings.    KAITLIN SCHMIDT MD       Discharge Medications   Current Discharge Medication List      START taking these medications    Details   order for DME Equipment being ordered: shower chair, 4 wheel walker with seat  Qty: 2 Units, Refills: 0    Associated Diagnoses: Osteoarthritis of lumbar spine with myelopathy; Sciatica of left side; Osteoarthritis of lumbosacral spine without myelopathy         CONTINUE these medications which have NOT CHANGED    Details   aspirin-acetaminophen-caffeine (EXCEDRIN MIGRAINE) 250-250-65 MG tablet Take 1 tablet by mouth 2 times daily as needed for headaches      baclofen (LIORESAL) 10 MG tablet Take 10 mg by mouth 2 times daily      diazepam (VALIUM) 5 MG tablet Take 5 mg by mouth every 6 hours as needed for muscle spasms       DULoxetine (CYMBALTA) 60 MG capsule Take 60 mg by mouth every morning       gabapentin (NEURONTIN) 600 MG tablet Take 1,200 mg by mouth 3 times daily      HEMP OIL OR EXTRACT OR OTHER CBD CANNABINOID, NOT MEDICAL CANNABIS, Inhale 1 ampule into the lungs daily       meloxicam (MOBIC) 7.5 MG tablet Take 1 tablet by mouth 2 times daily       omeprazole 20 MG tablet Take 20 mg by mouth every morning Take 30-60 minutes before a meal.  Qty: 30 tablet, Refills: 1    Associated Diagnoses: GERD (gastroesophageal reflux disease)      ondansetron (ZOFRAN ODT) 4 MG ODT tab Take 4 mg by mouth every 6 hours as needed for nausea or vomiting       oxyCODONE (ROXICODONE) 5 MG tablet Take 5-10 mg by mouth every 6 hours as needed            Allergies   Allergies   Allergen Reactions     Latex Rash     Liquid Adhesive Rash

## 2019-08-15 NOTE — PLAN OF CARE
Pt A&Ox4. VSS on RA. Pt up independently to bedside commode w/ some assistance due to chest tube. Voiding adequately. PIV saline locked. Pt c/o pain minimally to moderately relived by q3 IV dilaudid. Pt declines oral oxycodone due to lack of effectiveness and team plans to discuss pain management tomorrow. Chest tube put to water seal at 0710 w/ xray taken 2 hrs later. Chest tube clamped at 1100 per Pulmonary team verbal order. Team webpaged for written order. Chest xray ordered for 2pm.     Continue with plan of care.

## 2019-08-15 NOTE — PROGRESS NOTES
Brief Pulmonary Note    Patient seen and CXR reviewed. No pneumothorax present this AM after being suction all night. Chest tube placed to waterseal and repeat CXR did not show pneumothorax. Chest tube clamped and repeat CXR obtained 3 hours later with lung being fully inflated. Chest tube removed this afternoon. Ok to discharge from pulmonary perspective. No specific followup needed for pneumothorax. Note pathology returned for adenocarcinoma, informed by primary team. Patient is planning on following up with oncology locally.    Pulmonary will sign off. Please page with any questions.    Rylee Michel  Pulmonary and Critical Care Fellow  [phone]

## 2019-08-15 NOTE — PLAN OF CARE
VSS. Chest tube on right side. Dressing clean, dry, and intact. Ensure tubing is free of kinks. Positive for crepitus surrounding site. Suction set to -40. No air leak. X-ray taken today, plan for repeat chest x-ray 0500. PIV in left arm saline locked. Pain in upper right chest and lower back pain managed with PRN dilaudid x2. Valium schedule updated. Regular diet. Up with standby to keep cords from getting tangled. Voids spontaneously. Small BM this am per patient. Continue to monitor and notify MD with changes.

## 2019-08-16 ENCOUNTER — TELEPHONE (OUTPATIENT)
Dept: SURGERY | Facility: CLINIC | Age: 54
End: 2019-08-16

## 2019-08-16 NOTE — TELEPHONE ENCOUNTER
"I tried to reach Julia to discuss pathology results from her recent biopsy (+ right lung, invasive adenocarcinoma, acinar predominant).   I sent results to her primary provider in [place]/[facility].      I asked Dr. Curran (radiation oncology) to review and let me know if SBRT was possible.  He replied:          \"We could offer SBRT, would consider 5-10 fractions given proximity to cord and nerve roots.   If patient is a surgical candidate and she wishes to proceed with surgery, that would be very reasonable option too.     If she would like to have discussion and go over details about SBRT, would be happy to see the patient for consult with one our radiation oncologists (Me at [facility] or Dr. Nj at  if makes sense for proximity).\"    I will await her return call to see how she wants to proceed.    Addendum:   I spoke to Julia today (8/20) and we discussed her pathology.   She is very reluctant to pursue surgery with her many co-morbidities and ambulation issues.   She would prefer to discuss radiation therapy (SBRT, in particular).    I will have her get scheduled to see Dr. Katey Nj to discuss further.      She currently has a medical oncology appt with Dr. Stanley in late September; I am not sure that is needed but will leave that up to Dr. Nj after he meets with her.    "

## 2019-08-19 ENCOUNTER — MEDICAL CORRESPONDENCE (OUTPATIENT)
Dept: HEALTH INFORMATION MANAGEMENT | Facility: CLINIC | Age: 54
End: 2019-08-19

## 2019-08-20 NOTE — TELEPHONE ENCOUNTER
ONCOLOGY INTAKE: Records Information      APPT INFORMATION:  Referring provider:  Dr. Aggie Stanford MD  Referring provider s clinic:  [facility]  Reason for visit/diagnosis:  Malignant Neoplasm Of Lung Adenocarcinoma Right (HCC)  Has patient been notified of appointment date and time?: Yes, per pt    RECORDS INFORMATION:  Were the records received with the referral (via [employer])? yes    Has patient been seen for any external appt for this diagnosis? yes    If yes, where? [facility]    Has patient had any imaging or procedures outside of [place] for this condition? yes      If Yes, where? [facility]    ADDITIONAL INFORMATION:  Referral forwarded to CSS team.  Notes are in Care Everywhere

## 2019-08-22 LAB — COPATH REPORT: NORMAL

## 2019-08-25 LAB — COPATH REPORT: NORMAL

## 2019-09-06 ENCOUNTER — PRE VISIT (OUTPATIENT)
Dept: RADIATION ONCOLOGY | Facility: CLINIC | Age: 54
End: 2019-09-06

## 2019-09-06 ENCOUNTER — OFFICE VISIT (OUTPATIENT)
Dept: RADIATION ONCOLOGY | Facility: CLINIC | Age: 54
End: 2019-09-06
Attending: RADIOLOGY
Payer: MEDICARE

## 2019-09-06 VITALS
WEIGHT: 103 LBS | HEART RATE: 97 BPM | BODY MASS INDEX: 17.14 KG/M2 | DIASTOLIC BLOOD PRESSURE: 69 MMHG | OXYGEN SATURATION: 96 % | SYSTOLIC BLOOD PRESSURE: 110 MMHG

## 2019-09-06 DIAGNOSIS — C34.11 MALIGNANT NEOPLASM OF RIGHT UPPER LOBE OF LUNG (H): Primary | ICD-10-CM

## 2019-09-06 PROCEDURE — G0463 HOSPITAL OUTPT CLINIC VISIT: HCPCS | Performed by: RADIOLOGY

## 2019-09-06 RX ORDER — POLYETHYLENE GLYCOL 3350 17 G/17G
17 POWDER ORAL
COMMUNITY
Start: 2019-08-19 | End: 2020-08-18

## 2019-09-06 RX ORDER — NICOTINE 4 MG/1
INHALANT RESPIRATORY (INHALATION)
COMMUNITY
Start: 2019-08-19 | End: 2020-06-04

## 2019-09-06 ASSESSMENT — ENCOUNTER SYMPTOMS
HEADACHES: 0
FEVER: 0
INSOMNIA: 0
BACK PAIN: 0
EYE PAIN: 0
BLOOD IN STOOL: 0
FALLS: 0
SHORTNESS OF BREATH: 1
NAUSEA: 0
CHILLS: 0
FREQUENCY: 0
BRUISES/BLEEDS EASILY: 0
NECK PAIN: 0
BLURRED VISION: 0
DIZZINESS: 0
TINGLING: 0
WEIGHT LOSS: 0
HEMATURIA: 0
DYSURIA: 0
DEPRESSION: 1
SORE THROAT: 0
SEIZURES: 0
NERVOUS/ANXIOUS: 0
VOMITING: 0
DIARRHEA: 0
DIAPHORESIS: 0
DOUBLE VISION: 0
CONSTIPATION: 0

## 2019-09-06 NOTE — PROGRESS NOTES
HPI  INITIAL PATIENT ASSESSMENT    Diagnosis: Lung cancer    Prior radiation therapy: None    Prior chemotherapy: None    Prior hormonal therapy:No    Pain Eval:  Denies.   When she has pain takes oxycodone 10 mg.  Typical day she needs pain meds mid day and maybe at night.    Psychosocial  Living arrangements:   Fall Risk: ambulates with assistive device, uses a walker on wheels   referral needs: Not needed    Advanced Directive: No  Implantable Cardiac Device? No    Onset of menarche: N/A  LMP: No LMP recorded. Patient has had a hysterectomy.  Onset of menopause: N/A  Abnormal vaginal bleeding/discharge: N/A  Are you pregnant? No  Reproductive note: 4 children    Review of Systems   Constitutional: Negative for chills, diaphoresis, fever, malaise/fatigue and weight loss.   HENT: Negative for ear pain, hearing loss, nosebleeds and sore throat.    Eyes: Negative for blurred vision, double vision and pain.   Respiratory: Positive for shortness of breath (with activity).    Cardiovascular: Negative for chest pain and leg swelling.   Gastrointestinal: Negative for blood in stool, constipation, diarrhea, nausea and vomiting.   Genitourinary: Negative for dysuria, frequency, hematuria and urgency.   Musculoskeletal: Negative for back pain, falls, joint pain and neck pain.   Skin: Negative for rash.   Neurological: Negative for dizziness, tingling, seizures and headaches.   Endo/Heme/Allergies: Does not bruise/bleed easily.   Psychiatric/Behavioral: Positive for depression (Feels like she has her depression under control). The patient is not nervous/anxious and does not have insomnia.            Nurse face-to-face time: Level 4:  15 min face to face time

## 2019-09-06 NOTE — PROGRESS NOTES
RADIATION ONCOLOGY CONSULTATION  DATE:  September 6, 2019    PATIENT NAME: Julia Boudreaux  MEDICAL RECORD NUMBER: [id number]      REASON FOR CONSULTATION: Consideration of SBRT for treatment of stage IA3 [id number] adenocarcinoma of RUL of lung. Her PFTs; FEV1 90% (2.43L) and DLCO 82% (17.61 ml/min/mmHg). Her staging work up showed no evidence of pulmonary adenopathy or metastatic disease.       HISTORY OF PRESENT ILLNESS: Ms. Boudreaux is a 54 year old woman, a current smoker, who initially sought medical attention for evaluation of unexplained weight loss (30 Ibs over the past year) in May 2019.     She was evaluated by CT chest abdomen pelvis, at [facility],[place], [place], on 5/20/219 which demonstrated an incidental 0.9 x 2.2 x 12 cm right upper lobe nodule as well as mildly prominent right hilar lymph node measuring 0.9 x 1.1 cm. The patient was then referred to Pulmonary Medicine at [facility] for further evaluation. She underwent a staging PET/CT on 7/18/2019 which redemonstrated a 2.3x 0.6 cm hypermetabolic pleural based right upper lobe nodule (SUV max 9.8) as well as additional non-FDG avid left apical nodule. There was no evidence of hypermetabolic mediastinal adenopathy or metastatic disease.     The patient then was seen by Dr. Johnston on 7/25/19. The recommendation was to proceed with surgical resection. She had a pulmonary function test which showed FEV1 90% (2.43L) and DLCO 82% (17.61 ml/min/mmHg). She then underwent a CT guided biopsy from the RUL lesion on 8/12/19. The pathology ([id number]) showed invasive adenocarcinoma, acinar predominant. The tumor cells were positive for TTF-1, but negative for Napsin A, cytokeratin CK-5/6, and p63, supporting the diagnosis. PD-L1 testing was highly positive (80%). The molecular genetic testing ([id number]) revealed no mutation in the BRAF, EGFR, ERBB2, KRAS, MET, NRAS, RET genes. Her procedure course was complicated by pneumothorax for which she was admitted from  8/12 through 8/15/19. She underwent placement of chest tube and discharged in a stable condition. The patient was reluctant to pursue surgery with her many co-morbidities and ambulation issues. She preferred to discuss radiation therapy (SBRT) option.    On interview today, she was doing well. She reported ongoing chronic back pain secondary to herniated disks for which she takes Oxycodone 5 mg BID and medical marijuana (vapes) for breakthrough pain. She uses a cane to walk and a wheelchair when going longer distances. Otherwise, she denied any SOB, chest pain, hemoptysis, fever, chills, lymphadenopathy, urinary or bowel changes.Her KPS is 90%.     HISTORY OF PRIOR RT: None    HISTORY OF PRIOR SYSTEMIC THERAPY: None     PMH:   Past Medical History:   Diagnosis Date     'light-for-dates' infant with signs of fetal malnutrition      Abdominal aortic aneurysm (H)      Abnormal Papanicolaou smear of cervix and cervical HPV     Abn. 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    Hospitalized       PSH:  Past Surgical History:   Procedure Laterality Date     BLADDER SURGERY  2010     C COMBINED ANT/POST COLPORRHAPHY  02/22/13     C LIGATE FALLOPIAN TUBE       CHOLECYSTECTOMY, LAPOROSCOPIC  08/27/2014     chondrectomy of the spine  03/01/2008    discectomy     HAND SURGERY  2016     IR CHEST TUBE PLACEMENT NON-TUNNELED RIGHT  8/12/2019     laminotomy for decompression and exploration  03/22/2016     rw back  11-1-12;11-15-12    2 nerves cut and cauterized in low back     ANDRES and BSO  2002    bleeding     MEDICATIONS:  Current Outpatient Medications   Medication Sig Dispense Refill     Polyethylene Glycol 3350 (PEG 3350) POWD  Take 17 g by mouth       aspirin-acetaminophen-caffeine (EXCEDRIN MIGRAINE) 250-250-65 MG tablet Take 1 tablet by mouth 2 times daily as needed for headaches       baclofen (LIORESAL) 10 MG tablet Take 10 mg by mouth 2 times daily       diazepam (VALIUM) 5 MG tablet Take 5 mg by mouth every 6 hours as needed for muscle spasms        DULoxetine (CYMBALTA) 60 MG capsule Take 60 mg by mouth every morning        gabapentin (NEURONTIN) 600 MG tablet Take 1,200 mg by mouth 3 times daily       HEMP OIL OR EXTRACT OR OTHER CBD CANNABINOID, NOT MEDICAL CANNABIS, Inhale 1 ampule into the lungs daily        meloxicam (MOBIC) 7.5 MG tablet Take 1 tablet by mouth 2 times daily        NICOTROL 10 MG inhaler        omeprazole 20 MG tablet Take 20 mg by mouth every morning Take 30-60 minutes before a meal. 30 tablet 1     ondansetron (ZOFRAN ODT) 4 MG ODT tab Take 4 mg by mouth every 6 hours as needed for nausea or vomiting        order for DME Equipment being ordered: shower chair, 4 wheel walker with seat 2 Units 0     oxyCODONE (ROXICODONE) 5 MG tablet Take 5-10 mg by mouth every 6 hours as needed        ALLERGY:  Allergies   Allergen Reactions     Latex Rash     Liquid Adhesive Rash     FAMILY HISTORY:  Family History   Problem Relation Age of Onset     Diabetes Father         ETOH abuse     Cardiovascular Maternal Grandmother         stroke     Alzheimer Disease No family hx of      Cancer No family hx of         breast, colon, uterine, ovarian     Heart Disease No family hx of      Thyroid Disease No family hx of      Anesthesia Reaction No family hx of      SOCIAL HISTORY:  Social History     Tobacco Use     Smoking status: Current Every Day Smoker     Packs/day: 1.00     Years: 41.00     Pack years: 41.00     Smokeless tobacco: Never Used     Tobacco comment: declines quit line 2-12-13   Substance Use Topics     Alcohol use: Yes     Comment: rare     ROS: 10 point ROS reviewed as reported on the nursing assessment  note.    PHYSICAL EXAMINATION:    Gen: Alert, in NAD  Eyes: EOMI, sclera anicteric, PERRL  HENT      Head: NC/AT     Ears: No external auricular lesions     Nose/sinus: No rhinorrhea or epistaxis     Oral Cavity/Oropharynx: MMM, no thrush noted  Pulm: Breathing comfortably on room air, no audible wheezes or ronchi  CV: Well-perfused, no cyanosis  Abdominal: Soft, nondistended  Skin: Normal color and turgor  Neurologic/MSK: Alert and oriented x3, CN II-XII intact, motor 5/5 throughout, sensation intact to light touch throughout.     Psych: Appropriate mood and affect    IMPRESSION:  Stage IA3 [id number] adenocarcinoma of RUL of lung.     RECOMMENDATION: We recommend a course of SBRT for treatment of this RUL lesion. We discussed that SBRT is associated with excellent local control outcomes, comparable to surgery, in treatment of early stage NSCLC. The treatment plan will be  5 treatments delivered over 2 weeks. We also discussed that toxicity associated with SBRT is minimal including mild fatigue but most patients will not experience significant acute effects. Rarely, they might develop cough, pneumonitis, esophagitis and risk of rib fracture.     In regard to the left apical lesion, we recommend watching this lesion after treating the RUL lesion. We also discussed the follow up plan after radiotherapy treatment. At end of discussion, the patient agreed with the plan and signed the treatment consent. The patient is scheduled for simulation session next week. The treatment will start in two weeks after.     We spent 45 min with the patient, >50% devoted to counseling. The patient and her  have many questions during our conversation that were answered to their satisfaction and verbalized understanding.     The patient was seen and discussed with staff, Dr. Nj. Thank you for involving us in the care of this patient.  Please feel free to contact us with questions or concerns at any time.    Mor Mata,  MD  PGY-3 Resident, Radiation Oncology  [facility]    I saw the patient with the resident.  I agree with the resident's note and plan of care.      REYMUNDO Nj M.D.  Department of Radiation Oncology  [facility]

## 2019-09-06 NOTE — LETTER
9/6/2019       RE: Julia Boudreaux  [id number] [facility] [id number]     Dear Colleague,    Thank you for referring your patient, Julia Boudreaux, to the RADIATION ONCOLOGY CLINIC. Please see a copy of my visit note below.      HPI  INITIAL PATIENT ASSESSMENT    Diagnosis: Lung cancer    Prior radiation therapy: None    Prior chemotherapy: None    Prior hormonal therapy:No    Pain Eval:  Denies.   When she has pain takes oxycodone 10 mg.  Typical day she needs pain meds mid day and maybe at night.    Psychosocial  Living arrangements:   Fall Risk: ambulates with assistive device, uses a walker on wheels   referral needs: Not needed    Advanced Directive: No  Implantable Cardiac Device? No    Onset of menarche: N/A  LMP: No LMP recorded. Patient has had a hysterectomy.  Onset of menopause: N/A  Abnormal vaginal bleeding/discharge: N/A  Are you pregnant? No  Reproductive note: 4 children    Review of Systems   Constitutional: Negative for chills, diaphoresis, fever, malaise/fatigue and weight loss.   HENT: Negative for ear pain, hearing loss, nosebleeds and sore throat.    Eyes: Negative for blurred vision, double vision and pain.   Respiratory: Positive for shortness of breath (with activity).    Cardiovascular: Negative for chest pain and leg swelling.   Gastrointestinal: Negative for blood in stool, constipation, diarrhea, nausea and vomiting.   Genitourinary: Negative for dysuria, frequency, hematuria and urgency.   Musculoskeletal: Negative for back pain, falls, joint pain and neck pain.   Skin: Negative for rash.   Neurological: Negative for dizziness, tingling, seizures and headaches.   Endo/Heme/Allergies: Does not bruise/bleed easily.   Psychiatric/Behavioral: Positive for depression (Feels like she has her depression under control). The patient is not nervous/anxious and does not have insomnia.            Nurse face-to-face time: Level 4:  15 min face to face  time          RADIATION ONCOLOGY CONSULTATION  DATE:  September 6, 2019    PATIENT NAME: Julia Boudreaux  MEDICAL RECORD NUMBER: [id number]      REASON FOR CONSULTATION: Consideration of SBRT for treatment of stage IA3 [id number] adenocarcinoma of RUL of lung. Her PFTs; FEV1 90% (2.43L) and DLCO 82% (17.61 ml/min/mmHg). Her staging work up showed no evidence of pulmonary adenopathy or metastatic disease.       HISTORY OF PRESENT ILLNESS: Ms. Boudreaux is a 54 year old woman, a current smoker, who initially sought medical attention for evaluation of unexplained weight loss (30 Ibs over the past year) in May 2019.     She was evaluated by CT chest abdomen pelvis, at [facility],[place], [place], on 5/20/219 which demonstrated an incidental 0.9 x 2.2 x 12 cm right upper lobe nodule as well as mildly prominent right hilar lymph node measuring 0.9 x 1.1 cm. The patient was then referred to Pulmonary Medicine at [facility] for further evaluation. She underwent a staging PET/CT on 7/18/2019 which redemonstrated a 2.3x 0.6 cm hypermetabolic pleural based right upper lobe nodule (SUV max 9.8) as well as additional non-FDG avid left apical nodule. There was no evidence of hypermetabolic mediastinal adenopathy or metastatic disease.     The patient then was seen by Dr. Johnston on 7/25/19. The recommendation was to proceed with surgical resection. She had a pulmonary function test which showed FEV1 90% (2.43L) and DLCO 82% (17.61 ml/min/mmHg). She then underwent a CT guided biopsy from the RUL lesion on 8/12/19. The pathology ([id number]) showed invasive adenocarcinoma, acinar predominant. The tumor cells were positive for TTF-1, but negative for Napsin A, cytokeratin CK-5/6, and p63, supporting the diagnosis. PD-L1 testing was highly positive (80%). The molecular genetic testing ([id number]) revealed no mutation in the BRAF, EGFR, ERBB2, KRAS, MET, NRAS, RET genes. Her procedure course was complicated by pneumothorax for which she was  admitted from 8/12 through 8/15/19. She underwent placement of chest tube and discharged in a stable condition. The patient was reluctant to pursue surgery with her many co-morbidities and ambulation issues. She preferred to discuss radiation therapy (SBRT) option.    On interview today, she was doing well. She reported ongoing chronic back pain secondary to herniated disks for which she takes Oxycodone 5 mg BID and medical marijuana (vapes) for breakthrough pain. She uses a cane to walk and a wheelchair when going longer distances. Otherwise, she denied any SOB, chest pain, hemoptysis, fever, chills, lymphadenopathy, urinary or bowel changes.Her KPS is 90%.     HISTORY OF PRIOR RT: None    HISTORY OF PRIOR SYSTEMIC THERAPY: None     PMH:   Past Medical History:   Diagnosis Date     'light-for-dates' infant with signs of fetal malnutrition      Abdominal aortic aneurysm (H)      Abnormal Papanicolaou smear of cervix and cervical HPV     Abn. 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    Hospitalized       PSH:  Past Surgical History:   Procedure Laterality Date     BLADDER SURGERY  2010     C COMBINED ANT/POST COLPORRHAPHY  02/22/13     C LIGATE FALLOPIAN TUBE       CHOLECYSTECTOMY, LAPOROSCOPIC  08/27/2014     chondrectomy of the spine  03/01/2008    discectomy     HAND SURGERY  2016     IR CHEST TUBE PLACEMENT NON-TUNNELED RIGHT  8/12/2019     laminotomy for decompression and exploration  03/22/2016     rw back  11-1-12;11-15-12    2 nerves cut and cauterized in low back     ANDRES and BSO  2002    bleeding     MEDICATIONS:  Current Outpatient Medications   Medication Sig Dispense Refill     Polyethylene Glycol 3350  (PEG 3350) POWD Take 17 g by mouth       aspirin-acetaminophen-caffeine (EXCEDRIN MIGRAINE) 250-250-65 MG tablet Take 1 tablet by mouth 2 times daily as needed for headaches       baclofen (LIORESAL) 10 MG tablet Take 10 mg by mouth 2 times daily       diazepam (VALIUM) 5 MG tablet Take 5 mg by mouth every 6 hours as needed for muscle spasms        DULoxetine (CYMBALTA) 60 MG capsule Take 60 mg by mouth every morning        gabapentin (NEURONTIN) 600 MG tablet Take 1,200 mg by mouth 3 times daily       HEMP OIL OR EXTRACT OR OTHER CBD CANNABINOID, NOT MEDICAL CANNABIS, Inhale 1 ampule into the lungs daily        meloxicam (MOBIC) 7.5 MG tablet Take 1 tablet by mouth 2 times daily        NICOTROL 10 MG inhaler        omeprazole 20 MG tablet Take 20 mg by mouth every morning Take 30-60 minutes before a meal. 30 tablet 1     ondansetron (ZOFRAN ODT) 4 MG ODT tab Take 4 mg by mouth every 6 hours as needed for nausea or vomiting        order for DME Equipment being ordered: shower chair, 4 wheel walker with seat 2 Units 0     oxyCODONE (ROXICODONE) 5 MG tablet Take 5-10 mg by mouth every 6 hours as needed        ALLERGY:  Allergies   Allergen Reactions     Latex Rash     Liquid Adhesive Rash     FAMILY HISTORY:  Family History   Problem Relation Age of Onset     Diabetes Father         ETOH abuse     Cardiovascular Maternal Grandmother         stroke     Alzheimer Disease No family hx of      Cancer No family hx of         breast, colon, uterine, ovarian     Heart Disease No family hx of      Thyroid Disease No family hx of      Anesthesia Reaction No family hx of      SOCIAL HISTORY:  Social History     Tobacco Use     Smoking status: Current Every Day Smoker     Packs/day: 1.00     Years: 41.00     Pack years: 41.00     Smokeless tobacco: Never Used     Tobacco comment: declines quit line 2-12-13   Substance Use Topics     Alcohol use: Yes     Comment: rare     ROS: 10 point ROS reviewed as reported on the nursing  assessment note.    PHYSICAL EXAMINATION:    Gen: Alert, in NAD  Eyes: EOMI, sclera anicteric, PERRL  HENT      Head: NC/AT     Ears: No external auricular lesions     Nose/sinus: No rhinorrhea or epistaxis     Oral Cavity/Oropharynx: MMM, no thrush noted  Pulm: Breathing comfortably on room air, no audible wheezes or ronchi  CV: Well-perfused, no cyanosis  Abdominal: Soft, nondistended  Skin: Normal color and turgor  Neurologic/MSK: Alert and oriented x3, CN II-XII intact, motor 5/5 throughout, sensation intact to light touch throughout.     Psych: Appropriate mood and affect    IMPRESSION:  Stage IA3 [id number] adenocarcinoma of RUL of lung.     RECOMMENDATION: We recommend a course of SBRT for treatment of this RUL lesion. We discussed that SBRT is associated with excellent local control outcomes, comparable to surgery, in treatment of early stage NSCLC. The treatment plan will be  5 treatments delivered over 2 weeks. We also discussed that toxicity associated with SBRT is minimal including mild fatigue but most patients will not experience significant acute effects. Rarely, they might develop cough, pneumonitis, esophagitis and risk of rib fracture.     In regard to the left apical lesion, we recommend watching this lesion after treating the RUL lesion. We also discussed the follow up plan after radiotherapy treatment. At end of discussion, the patient agreed with the plan and signed the treatment consent. The patient is scheduled for simulation session next week. The treatment will start in two weeks after.     We spent 45 min with the patient, >50% devoted to counseling. The patient and her  have many questions during our conversation that were answered to their satisfaction and verbalized understanding.     The patient was seen and discussed with staff, Dr. Nj. Thank you for involving us in the care of this patient.  Please feel free to contact us with questions or concerns at any time.    Mor  MD Esperanza  PGY-3 Resident, Radiation Oncology  [facility]    I saw the patient with the resident.  I agree with the resident's note and plan of care.      REYMUNDO Nj M.D.  Department of Radiation Oncology  [facility]

## 2019-09-10 ENCOUNTER — ALLIED HEALTH/NURSE VISIT (OUTPATIENT)
Dept: RADIATION ONCOLOGY | Facility: CLINIC | Age: 54
End: 2019-09-10
Attending: RADIOLOGY
Payer: MEDICARE

## 2019-09-10 DIAGNOSIS — C34.11 MALIGNANT NEOPLASM OF RIGHT UPPER LOBE OF LUNG (H): Primary | ICD-10-CM

## 2019-09-10 PROCEDURE — 77470 SPECIAL RADIATION TREATMENT: CPT | Performed by: RADIOLOGY

## 2019-09-10 PROCEDURE — 77290 THER RAD SIMULAJ FIELD CPLX: CPT | Performed by: RADIOLOGY

## 2019-09-10 PROCEDURE — 77334 RADIATION TREATMENT AID(S): CPT | Performed by: RADIOLOGY

## 2019-09-10 NOTE — PROGRESS NOTES
Simulation Note    Date: September 10, 2019    Patient: Julia Boudreaux    Diagnosis: Malignant neoplasm of right upper lobe of lung (H)    Type of Simulation:  Cure/ Definitive     Patient Position: Supine    Patient Immobilization: SBRT Frame  Vac-Loc    Simulation Aid(s): None    Image Acquisition: Conventional CT simulation with 4D for respiratory studies    Total Dose Planned: 5000 cGy      Dose/fraction:1000 cGy    Energy of machine:  6 MV           Type of Radiotherapy Technique: SBRT(Stereotectic Body Radiotherapy)      Continuing Physcis/Dosimetry  and Dose calculations are ordered.  Simple simulations will be done prior to new start and changes in fields.  Weekly on treat visit.      REYMUNDO Nj M.D.  Department of Radiation Oncology  [facility]

## 2019-09-10 NOTE — PROGRESS NOTES
Radiation Therapy Patient Education    Person involved with teaching: Patient    Patient educational needs for self management of treatment-related side effects assessment completed.  [facility] Patient Ed tab contains Patient Learning Assessment    Education Materials Given  Radiation Therapy and You    Educational Topics Discussed  Side effects expected, Skin care, Nutrition and weight loss and When to call MD/RN    Response To Teaching  Verbalizes understanding    GYN Only  Vaginal Dilator-given and educated: N/A    Referrals sent: None    Chemotherapy?  No

## 2019-09-23 ENCOUNTER — PRE VISIT (OUTPATIENT)
Dept: ONCOLOGY | Facility: CLINIC | Age: 54
End: 2019-09-23

## 2019-09-25 ENCOUNTER — APPOINTMENT (OUTPATIENT)
Dept: RADIATION ONCOLOGY | Facility: CLINIC | Age: 54
End: 2019-09-25
Attending: RADIOLOGY
Payer: MEDICARE

## 2019-09-25 PROCEDURE — 77373 STRTCTC BDY RAD THER TX DLVR: CPT | Performed by: RADIOLOGY

## 2019-09-27 ENCOUNTER — ONCOLOGY VISIT (OUTPATIENT)
Dept: RADIATION ONCOLOGY | Facility: CLINIC | Age: 54
End: 2019-09-27

## 2019-09-27 ENCOUNTER — APPOINTMENT (OUTPATIENT)
Dept: RADIATION ONCOLOGY | Facility: CLINIC | Age: 54
End: 2019-09-27
Attending: RADIOLOGY
Payer: MEDICARE

## 2019-09-27 PROCEDURE — 77373 STRTCTC BDY RAD THER TX DLVR: CPT | Performed by: RADIOLOGY

## 2019-09-28 NOTE — PROCEDURES
MINGO VERONICA        MR#: [id number]        STEREOTACTIC BODY RADIOTHERAPY TREATMENT (SBRT) NOTE        DATE OF SERVICE:  9/27/2019            Diagnosis: C34.11  Malignant neoplasm of upper lobe, right bronchus or lung,cancer.  c   T  N  M  .     Medical Indication for SBRT: The patient has medically inoperable Right   Non-small cell  lung cancer due to poor pulmonary function.     Intent of SBRT: Curative     Narrative:  This is a treatment note for 1st fraction of the 5 fraction SBRT for right non-small cell     lung cancer.      The patient was positioned in the custom made immobilization in the [employer] Stereotactic   body frame allowing X, Y, Z coordinates to be verified. The patient had a cone beam CT   scan today prior to treatment to verify the new central axis coordinates.       The new XYZ coordinates has shifted to ? 0. 0  cm X(lateral), ? 0. 0 cm Y(longitudinal),   and  ? 0. 0 cm Z(vertical).  After the cone beam CT verifications, the CAX set up was on   the treatment machine ([employer]), the patient had treatment delivered with 10 non-coplanar   beams. The patient had 1000 cGy delivered to the 85% isodose line with heterogeneity   corrections with a 6 MV photons. The cumulative dose is  1000 cGy of planned 5000   cGy.     No complications were encountered.  There were no complaint or skin reactions seen.   The patient tolerated the therapy well and left the department in stable condition and will   return for the 2nd fraction of total 5 on September 27, 2019.    The patient will continue with radiotherapy.     Braulio Rico M.D.   Department of Therapeutic Radiology

## 2019-09-28 NOTE — PROCEDURES
MINGO VERONICA        MR#: [id number]        STEREOTACTIC BODY RADIOTHERAPY TREATMENT (SBRT) NOTE        DATE OF SERVICE:  9/27/2019          Diagnosis: C34.11  Malignant neoplasm of upper lobe, right bronchus or lung,cancer. 1 -   Right T1c N0 M0 IA3 T1c N0 M0 IA3      Medical Indication for SBRT: The patient has medically inoperable Right   Non-small cell  lung cancer due to poor pulmonary function.     Intent of SBRT: Curative     Narrative:  This is a treatment note for 1st fraction of the 5 fraction SBRT for right non-small cell     lung cancer.      The patient was positioned in the custom made immobilization in the [employer]   body frame allowing X, Y, Z coordinates to be verified. The patient had a cone beam CT   scan today prior to treatment to verify the new central axis coordinates.       The new XYZ coordinates has shifted to:     X(lateral) :    ?   1 .  5  cm  Y(longitudinal): ?   0 .  3  cm  Z(vertical):  ?   0 .  6  cm     After the cone beam CT verifications, the CAX set up was on the treatment machine (  True-Beam), the patient had treatment delivered with 10 non-coplanar beams. The patient   had 1000 cGy delivered to the 85% isodose line with heterogeneity corrections with a 6   MV photons. The cumulative dose is  1000 cGy of planned 5000 cGy.     No complications were encountered.  There were no complaint or skin reactions seen.   The patient tolerated the therapy well and left the department in stable condition.   The patient will continue with radiotherapy.     JULISA Iyer M.D.   Department of Therapeutic Radiology

## 2019-09-29 ENCOUNTER — HEALTH MAINTENANCE LETTER (OUTPATIENT)
Age: 54
End: 2019-09-29

## 2019-09-30 ENCOUNTER — APPOINTMENT (OUTPATIENT)
Dept: RADIATION ONCOLOGY | Facility: CLINIC | Age: 54
End: 2019-09-30
Attending: RADIOLOGY
Payer: MEDICARE

## 2019-09-30 ENCOUNTER — ONCOLOGY VISIT (OUTPATIENT)
Dept: RADIATION ONCOLOGY | Facility: CLINIC | Age: 54
End: 2019-09-30

## 2019-09-30 PROCEDURE — 77373 STRTCTC BDY RAD THER TX DLVR: CPT | Performed by: RADIOLOGY

## 2019-10-01 NOTE — PROCEDURES
MINGO VERONICA        MR#: [id number]        STEREOTACTIC BODY RADIOTHERAPY TREATMENT (SBRT) NOTE        DATE OF SERVICE:  9/30/2019          Diagnosis: C34.11  Malignant neoplasm of upper lobe, right bronchus or lung,cancer. 1 -   Right T1c N0 M0 IA3 T1c N0 M0 IA3      Medical Indication for SBRT: The patient has medically inoperable Right   Non-small cell  lung cancer due to poor pulmonary function.     Intent of SBRT: Curative     Narrative:  This is a treatment note for 1st fraction of the 5 fraction SBRT for right non-small cell     lung cancer.      The patient was positioned in the custom made immobilization in the [employer]   body frame allowing X, Y, Z coordinates to be verified. The patient had a cone beam CT   scan today prior to treatment to verify the new central axis coordinates.       The new XYZ coordinates has shifted to:     X(lateral) :    ?  - 0 .  6  cm  Y(longitudinal): ?   -0 .  9  cm  Z(vertical):  ?   -0 .  8  cm     After the cone beam CT verifications, the CAX set up was on the treatment machine (  True-Beam), the patient had treatment delivered with 10 non-coplanar beams. The patient   had 1000 cGy delivered to the 85% isodose line with heterogeneity corrections with a 6   MV photons. The cumulative dose is  1000 cGy of planned 5000 cGy.     No complications were encountered.  There were no complaint or skin reactions seen.   The patient tolerated the therapy well and left the department in stable condition.   The patient will continue with radiotherapy.     Homero Iyer M.D. / Ph.D.  Department of Therapeutic Radiology

## 2019-10-02 ENCOUNTER — APPOINTMENT (OUTPATIENT)
Dept: RADIATION ONCOLOGY | Facility: CLINIC | Age: 54
End: 2019-10-02
Attending: RADIOLOGY
Payer: MEDICARE

## 2019-10-02 VITALS
SYSTOLIC BLOOD PRESSURE: 108 MMHG | HEART RATE: 96 BPM | DIASTOLIC BLOOD PRESSURE: 77 MMHG | WEIGHT: 104.5 LBS | BODY MASS INDEX: 17.39 KG/M2

## 2019-10-02 DIAGNOSIS — C34.11 MALIGNANT NEOPLASM OF RIGHT UPPER LOBE OF LUNG (H): Primary | ICD-10-CM

## 2019-10-02 PROCEDURE — 77373 STRTCTC BDY RAD THER TX DLVR: CPT | Performed by: RADIOLOGY

## 2019-10-02 NOTE — PROGRESS NOTES
The note is generated in the [employer] radiation oncology record and verify system and it will be transferred to the EMR via interface.

## 2019-10-02 NOTE — Clinical Note
10/2/2019       RE: Julia Boudreaux  [id number] [facility] [address]     Dear Colleague,    Thank you for referring your patient, Julia Boudreaux, to the RADIATION ONCOLOGY CLINIC. Please see a copy of my visit note below.    The note is generated in the [employer] (acquired by [employer]) radiation oncology record and verify system and it will be transferred to the EMR via interface.    Again, thank you for allowing me to participate in the care of your patient.      Sincerely,    Katey Nj MD

## 2019-10-02 NOTE — PATIENT INSTRUCTIONS
Continuing Management of the Effects of Radiation Treatment    The side effects of radiation therapy should gradually decrease in 2 to 3 weeks after you have finished radiation.  Some effects take longer to resolve.    Skin reactions:  Skin changes (such as redness or irritation) should begin to get better gradually.  Some people will have a permanent change in skin color.  Their skin may be more pink or  tan  than the untreated skin.  The skin may be thinner or more fragile than before treatment.  Continue to use a gentle moisturizing lotion for several months.  You should always protect the skin in the area that was treated by using sunscreen of spf 30 or higher.      Other skin care instructions:    Fatigue caused by radiation therapy will decrease and your energy will improve.    For concerns or questions call Department of Therapeutic Radiology [phone]

## 2019-10-03 NOTE — PROCEDURES
MINGO VERONICA        MR#: [id number]        STEREOTACTIC BODY RADIOTHERAPY TREATMENT (SBRT) NOTE        DATE OF SERVICE:  10/02/2019          Diagnosis: C34.11 Malignant neoplasm of upper lobe, right bronchus or lung cancer.   Right T1c N0 M0,  IA3     Medical Indication for SBRT: The patient has medically inoperable Right   Non-small cell  lung cancer due to poor pulmonary function.     Intent of SBRT: Curative     Narrative:  This is a treatment note for 4th fraction of the 5 fraction SBRT for right non-small cell     lung cancer.      The patient was positioned in the custom made immobilization in the [employer]   body frame allowing X, Y, Z coordinates to be verified. The patient had a cone beam CT   scan today prior to treatment to verify the new central axis coordinates.       The new XYZ coordinates has shifted to ? 0. 6  cm X(lateral), ? 0. 1 cm Y(longitudinal),   and  ? 0. 6 cm Z(vertical).  After the cone beam CT verifications, the CAX set up was on   the treatment machine (True-Beam), the patient had treatment delivered with 10 non-  coplanar beams. The patient had 1000 cGy delivered to the 86% isodose line with   heterogeneity corrections with a 6 MV photons. The cumulative dose is  4000 cGy of   planned 5000 cGy.     No complications were encountered.  There were no complaint or skin reactions seen.   The patient tolerated the therapy well and left the department in stable condition and will   return for the 5th fraction of total 5 on October 4, 2019.    The patient will continue with radiotherapy.     REYMUNDO Nj M.D.   Department of Therapeutic Radiology

## 2019-10-04 ENCOUNTER — ONCOLOGY VISIT (OUTPATIENT)
Dept: RADIATION ONCOLOGY | Facility: CLINIC | Age: 54
End: 2019-10-04

## 2019-10-04 ENCOUNTER — APPOINTMENT (OUTPATIENT)
Dept: RADIATION ONCOLOGY | Facility: CLINIC | Age: 54
End: 2019-10-04
Attending: RADIOLOGY
Payer: MEDICARE

## 2019-10-04 PROCEDURE — 77373 STRTCTC BDY RAD THER TX DLVR: CPT | Performed by: RADIOLOGY

## 2019-10-04 PROCEDURE — 77336 RADIATION PHYSICS CONSULT: CPT | Performed by: RADIOLOGY

## 2019-10-05 NOTE — PROCEDURES
MINGO VERONICA        MR#: [id number]        STEREOTACTIC BODY RADIOTHERAPY TREATMENT (SBRT) NOTE        DATE OF SERVICE:  10/04/2019          Diagnosis: C34.11  Malignant neoplasm of upper lobe, right bronchus or lung,cancer. 1 -   Right T1c N0 M0 IA3 T1c N0 M0 IA3      Medical Indication for SBRT: The patient has medically inoperable Right   Non-small cell  lung cancer due to poor pulmonary function.     Intent of SBRT: Curative     Narrative:  This is a treatment note for 5th fraction of the 5 fraction SBRT for right non-small cell     lung cancer.      The patient was positioned in the custom made immobilization in the [employer]   body frame allowing X, Y, Z coordinates to be verified. The patient had a cone beam CT   scan today prior to treatment to verify the new central axis coordinates.       The new XYZ coordinates has shifted to:     X(lateral) :    ?   0 .  8  cm  Y(longitudinal): ?   0 .  2  cm  Z(vertical):  ?   0 .  3  cm     After the cone beam CT verifications, the CAX set up was on the treatment machine (  True-Beam), the patient had treatment delivered with 10 non-coplanar beams. The patient   had 1000 cGy delivered to the 85% isodose line with heterogeneity corrections with a 6   MV photons. The cumulative dose is  1000 cGy of planned 5000 cGy.     No complications were encountered.  There were no complaint or skin reactions seen.   The patient tolerated the therapy well and left the department in stable condition.   The patient will continue with radiotherapy.     Homero Iyer M.D, PhD.   Department of Therapeutic Radiology

## 2019-10-07 ENCOUNTER — TELEPHONE (OUTPATIENT)
Dept: ONCOLOGY | Facility: CLINIC | Age: 54
End: 2019-10-07

## 2019-10-07 NOTE — TELEPHONE ENCOUNTER
Tobacco Treatment Program at the [facility] attempted to reach Ms. Boudreaux on 10/7/2019 regarding the tobacco cessation program to help Ms. Boudreaux to quit smoking. We will attempt to reach Ms. Boudreaux another time.     [facility]ia [facility]head [facility]  Tobacco Treatment Specialist  PH: [phone]

## 2019-10-08 NOTE — TELEPHONE ENCOUNTER
Tobacco Treatment Program at the [facility] attempted to reach Ms. Boudreaux on 10/8/2019 regarding the tobacco cessation program to help Ms. Boudreaux to quit smoking. We will attempt to reach Ms. Boudreaux another time.     [facility]ia [facility]  Tobacco Treatment Specialist  PH: [phone]

## 2019-10-11 ENCOUNTER — ONCOLOGY VISIT (OUTPATIENT)
Dept: RADIATION ONCOLOGY | Facility: CLINIC | Age: 54
End: 2019-10-11

## 2019-10-13 NOTE — TELEPHONE ENCOUNTER
RECORDS RECEIVED FROM: Other chronic back pain [M54.9, G89.29]/Prev surgeries 2016 Dr. Will Mejia [place]/MRI 6/2019   DATE RECEIVED: Oct 18, 2019    NOTES STATUS DETAILS   OFFICE NOTE from referring provider Internal Dr. Mejia 2012   OFFICE NOTE from other specialist Care Everywhere 7/23/18 [facility], [facility]   DISCHARGE SUMMARY from hospital Care Everywhere    DISCHARGE REPORT from the ER N/A    OPERATIVE REPORT Care Everywhere 3/22/16 Dr. Mejia   MEDICATION LIST Care Everywhere    IMPLANT RECORD/STICKER N/A    LABS     CBC/DIFF N/A    CULTURES N/A    INJECTIONS DONE IN RADIOLOGY Internal 2012   MRI Received  [facility] 2019   CT SCAN N/A    XRAYS (IMAGES & REPORTS) N/A    TUMOR     PATHOLOGY  Slides & report N/A

## 2019-10-14 NOTE — TELEPHONE ENCOUNTER
Tobacco Treatment Program at the [facility] attempted to reach Ms. Boudreaux on 10/14/2019 regarding the tobacco cessation program to help Ms. Boudreaux to quit smoking. We will attempt to reach Ms. Boudreaux another time.     [facility]ofia [facility]head [facility]  Tobacco Treatment Specialist  PH: [phone]

## 2019-10-16 DIAGNOSIS — M54.50 LUMBAR PAIN: Primary | ICD-10-CM

## 2019-10-16 NOTE — PROCEDURES
Radiotherapy Treatment Summary          Date of Report: 2019     PATIENT: MINGO VERONICA  MEDICAL RECORD NO: [id number]  : 1965     DIAGNOSIS: C34.11 Malignant neoplasm of upper lobe, right bronchus or lung  INTENT OF RADIOTHERAPY: Cure  PATHOLOGY:   Adenocarcinoma of RUL                               STAGE: IA3 [id number]  CONCURRENT SYSTEMIC THERAPY:    None                 Details of the treatments summarized below are found in records kept in the Department of Radiation Oncology at [facility].  Treatment Summary:  Radiation Oncology - Course: 1 Protocol:   Treatment Site Current Dose Modality From To Elapsed Days Fx.  1 RUL Lesion   5,000 cGy  06 X  9/25/2019 10/04/2019   9  5          Dose per Fraction:   1000 cGy   Total Dose:     5000 cGy         COMMENTS:  Ms. Veronica is a 54 year old woman, a current smoker who was found to have an incidental 0.9   x 2.2 x 12 cm right upper lobe nodule as well as mildly prominent right hilar lymph node measuring 0.9 x 1.1   cm, during work up for significant weight loss. She underwent a staging PET/CT on 2019 which   redemonstrated a 2.3x 0.6 cm hypermetabolic pleural based right upper lobe nodule (SUV max 9.8) with no   evidence of hypermetabolic mediastinal adenopathy or metastatic disease. She had a pulmonary function test   which showed FEV1 90% (2.43L) and DLCO 82% (17.61 ml/min/mmHg). She then underwent a CT guided   biopsy from the RUL lesion on 19. The pathology ([id number]) showed invasive adenocarcinoma, acinar   predominant. PD-L1 testing was highly positive (80%). The molecular genetic testing ([id number]) revealed no   mutation. The patient declined surgery and preferred to proceed with SBRT that she was ultimately offered. The   treatment plan was 5000 cGy in 5 fractions, prescribed to 86%IDL. The patient tolerated jher treatment well   with no unplanned breaks or interruptions. She reported no side effects during her treatment.                       ED visits/hospitalizations:  None      Missed treatments:   None      Acute Toxicity Profile by CTC v5.0:   None      PAIN MANAGEMENT:      None                         FOLLOW UP PLAN:  RTC in 3 months with CT chest w/o contrast prior                                 Resident Physician: Mor Mata M.D.   Staff Physician: REYMUNDO Nj M.D.  Physicist: Aide Smyth, PhD     CC:   Joseph Roman MD                                     Radiation Oncology:  [facility] [address] [place] [address]

## 2019-10-18 ENCOUNTER — PRE VISIT (OUTPATIENT)
Dept: ORTHOPEDICS | Facility: CLINIC | Age: 54
End: 2019-10-18

## 2019-10-18 ENCOUNTER — ANCILLARY PROCEDURE (OUTPATIENT)
Dept: GENERAL RADIOLOGY | Facility: CLINIC | Age: 54
End: 2019-10-18
Attending: ORTHOPAEDIC SURGERY
Payer: MEDICARE

## 2019-10-18 ENCOUNTER — OFFICE VISIT (OUTPATIENT)
Dept: ORTHOPEDICS | Facility: CLINIC | Age: 54
End: 2019-10-18
Payer: MEDICARE

## 2019-10-18 VITALS — HEIGHT: 65 IN | BODY MASS INDEX: 17.33 KG/M2 | WEIGHT: 104 LBS

## 2019-10-18 DIAGNOSIS — M54.50 LUMBAR PAIN: Primary | ICD-10-CM

## 2019-10-18 DIAGNOSIS — M54.16 LUMBAR RADICULOPATHY: ICD-10-CM

## 2019-10-18 ASSESSMENT — ENCOUNTER SYMPTOMS
POOR WOUND HEALING: 0
BLOOD IN STOOL: 0
NIGHT SWEATS: 1
NECK PAIN: 1
HALLUCINATIONS: 0
RECTAL PAIN: 0
HEADACHES: 0
MYALGIAS: 1
BRUISES/BLEEDS EASILY: 1
BOWEL INCONTINENCE: 0
TREMORS: 0
TINGLING: 1
INSOMNIA: 0
TASTE DISTURBANCE: 0
NAUSEA: 1
DIARRHEA: 0
ALTERED TEMPERATURE REGULATION: 1
SNORES LOUDLY: 1
EYE WATERING: 0
EYE PAIN: 0
CONSTIPATION: 1
NERVOUS/ANXIOUS: 1
TROUBLE SWALLOWING: 0
DECREASED CONCENTRATION: 1
DYSPNEA ON EXERTION: 1
EYE REDNESS: 0
FEVER: 0
JAUNDICE: 0
STIFFNESS: 1
POSTURAL DYSPNEA: 0
PARALYSIS: 0
NAIL CHANGES: 0
POLYDIPSIA: 0
HEMOPTYSIS: 0
ARTHRALGIAS: 1
FATIGUE: 1
SINUS CONGESTION: 0
SKIN CHANGES: 1
CHILLS: 0
MEMORY LOSS: 0
DISTURBANCES IN COORDINATION: 1
MUSCLE WEAKNESS: 1
BLOATING: 0
SPUTUM PRODUCTION: 0
SMELL DISTURBANCE: 0
WEIGHT GAIN: 0
WEIGHT LOSS: 1
LOSS OF CONSCIOUSNESS: 0
PANIC: 0
MUSCLE CRAMPS: 1
HEARTBURN: 0
SEIZURES: 0
POLYPHAGIA: 0
SPEECH CHANGE: 0
DOUBLE VISION: 0
EYE IRRITATION: 0
COUGH: 0
COUGH DISTURBING SLEEP: 0
SORE THROAT: 0
WEAKNESS: 1
DECREASED APPETITE: 1
HOARSE VOICE: 0
DIZZINESS: 1
NECK MASS: 0
JOINT SWELLING: 1
ABDOMINAL PAIN: 0
NUMBNESS: 1
INCREASED ENERGY: 1
SWOLLEN GLANDS: 0
DEPRESSION: 1
SHORTNESS OF BREATH: 0
BACK PAIN: 1
VOMITING: 0
WHEEZING: 0
SINUS PAIN: 0

## 2019-10-18 ASSESSMENT — MIFFLIN-ST. JEOR: SCORE: 1062.62

## 2019-10-18 NOTE — LETTER
10/18/2019       RE: Julia Boudreaux  [address] [facility] [address]     Dear Colleague,    Thank you for referring your patient, Julia Boudreaux, to the [facility] at [facility]. Please see a copy of my visit note below.    Spine Surgery Consultation    REFERRING PHYSICIAN: Jennifer Allen   PRIMARY CARE PHYSICIAN: Aggie Zavala           Chief Complaint:   Consult (chronic back pain )      History of Present Illness:  Symptom Profile Including: location of symptoms, onset, severity, exacerbating/alleviating factors, previous treatments:        Julia Boudreaux is a 54 year old female who presents for evaluation of left sided low back pain and left leg radiculopathy symptoms.  Patient's left leg pain has been present for about 3 to 4 years.  Pain localized on lateral aspect of thigh and associated with numbness on lateral aspect of calf into lateral foot.  Pain in the low back and leg worse with sitting or standing for too long.  Improved with laying down.  Patient is more bothered by pain in her back than the pain in her leg; rated as 10/10 today.  She says that the pain in her left leg is similar to pain that she had in her right leg prior to previous discectomies at L4-L5 and L5-S1 in the past.  She says that after the surgeries, her right leg pain was relieved.  Patient has not tried any recent physical therapy.  Patient has not had any recent epidural steroid injections.  Patient is on oxycodone as needed, 1200mg gabapentin TID, Mobic, and baclofen for pain relief. Also uses medical marijuana for pain relief    PMH:  Previous 2 discectomies were done at RIGHT L4-L5 and L5-S1 in 2008, 2016 with Dr. Lundy (now retired) in [facility].  Lung cancer.  Completed radiation 2 weeks ago.    Social  Lives in [place] at home with family.  Currently on disability, does not work.  Denies alcohol use  Tobacco: Smokes 1/2-3/4 pack per day  Illicit drug  use: medical marijuana         Past Medical History:     Past Medical History:   Diagnosis Date     'light-for-dates' infant with signs of fetal malnutrition      Abdominal aortic aneurysm (H)      Abnormal Papanicolaou smear of cervix and cervical HPV     Abn. 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    Hospitalized            Past Surgical History:     Past Surgical History:   Procedure Laterality Date     BLADDER SURGERY  2010     C COMBINED ANT/POST COLPORRHAPHY  02/22/13     C LIGATE FALLOPIAN TUBE       CHOLECYSTECTOMY, LAPOROSCOPIC  08/27/2014     chondrectomy of the spine  03/01/2008    discectomy     HAND SURGERY  2016     IR CHEST TUBE PLACEMENT NON-TUNNELED RIGHT  8/12/2019     laminotomy for decompression and exploration  03/22/2016     rw back  11-1-12;11-15-12    2 nerves cut and cauterized in low back     ANDRES and BSO  2002    bleeding            Social History:     Social History     Tobacco Use     Smoking status: Current Every Day Smoker     Packs/day: 1.00     Years: 41.00     Pack years: 41.00     Smokeless tobacco: Never Used     Tobacco comment: declines quit line 2-12-13   Substance Use Topics     Alcohol use: Yes     Comment: rare            Family History:     Family History   Problem Relation Age of Onset     Diabetes Father         ETOH abuse     Cardiovascular Maternal Grandmother         stroke     Alzheimer Disease No family hx of      Cancer No family hx of         breast, colon, uterine, ovarian     Heart Disease No family hx of      Thyroid Disease No family hx of      Anesthesia Reaction No family hx of             Allergies:     Allergies   Allergen Reactions     Latex Rash      Liquid Adhesive Rash            Medications:     Current Outpatient Medications   Medication     aspirin-acetaminophen-caffeine (EXCEDRIN MIGRAINE) 250-250-65 MG tablet     baclofen (LIORESAL) 10 MG tablet     diazepam (VALIUM) 5 MG tablet     DULoxetine (CYMBALTA) 60 MG capsule     gabapentin (NEURONTIN) 600 MG tablet     HEMP OIL OR EXTRACT OR OTHER CBD CANNABINOID, NOT MEDICAL CANNABIS,     meloxicam (MOBIC) 7.5 MG tablet     NICOTROL 10 MG inhaler     omeprazole 20 MG tablet     ondansetron (ZOFRAN ODT) 4 MG ODT tab     order for DME     oxyCODONE (ROXICODONE) 5 MG tablet     Polyethylene Glycol 3350 (PEG 3350) POWD     No current facility-administered medications for this visit.              Review of Systems:     A 10 point ROS was performed and reviewed. Specific responses to these questions are noted at the end of the document.         Physical Exam:   PHYSICAL EXAM:   Constitutional - Patient appears stated age. Patient is very thin.   Respiratory - Patient is breathing normally and in no respiratory distress.   Skin - No suspicious rashes or lesions.   Psychiatric - Normal mood and affect.   Cardiovascular - Extremities warm and well perfused.   Eyes - Visual acuity is normal to the written word.   ENT - Hearing intact to the spoken word.   GI - No abdominal distention.   Musculoskeletal - patient has difficulty walking and standing from seated position due to low back and left leg pain        Thoracic Spine:    Appearance - Normal    Palpation - Non-tender to palpation    Strength/ROM - deferred            Lumbar Spine:    Appearance - Normal     Palpation - Non-tender to palpation midline. Tender to palpation of right PSIS area    ROM - Full     Motor - patient has generalized muscle weakness but is weaker with leftplantarflexion and great toe extension       LOWER EXTREMITY Left Right   Hip flexion 4+/5 4+/5   Knee flexion 4+/5 4+/5   Knee extension 4+/5 4+/5   Ankle dorsiflexion 4+/5 4+/5   Ankle  plantarflexion 4/5 4+/5   Great toe extension 4/5 4+/5        Special tests -     Straight leg raise - Positive left /Negative right     Neurologic - Sensation intact to light touch bilaterally.      REFLEXES Left Right             clonus 0 beats  0 beats   Patella 1+ 1+   Ankle jerk 1+ 1+                 Imaging:   We ordered and independently reviewed new radiographs at this clinic visit. The results were discussed with the patient.  Findings include:    October 18, 2019 lateral flexion-extension lumbar radiographs as well as anterior posterior view shows L5-S1 spondylosis with mild foraminal stenosis     lumbar MRI May 29, 2019 shows moderate diffuse degenerative changes with some mild foraminal stenosis at L5-S1 with no severe neurologic compression, evidence of previous lumbar decompressions as noted on the axial views             Assessment and Plan:   Assessment:  54 year old female with left low back pain and left leg radicular symptoms L5 distribution.  Imaging findings are mild to moderate.     Plan:  Patient with low back pain and left leg radicular symptoms that appear to be in an L5 distribution.  Lumbar MRI reviewed today does not show significant nerve compression, other than some mild narrowing at left L5 nerve root, to explain these symptoms. It is hard to tell whether this narrowing is contributing to patient's current symptoms or not.  Therefore recommend getting left L5 selective nerve root block for both diagnostic and therapeutic reasons.  If this injection hopes relieve her current symptoms, then it is more likely that surgery would be helpful.  We would also recommend her trying physical therapy, specifically pool therapy, to help exhaust conservative management of this problem before proceeding with potential surgical intervention.  Also highly recommended that patient quit smoking because if she were to need surgical intervention, it would need to be a fusion type surgery.  For fusion  surgery to heal, need good bone growth, and tobacco/nicotine is known to interfere with bone growth.   - left L5 SNRB  - pool therapy referral  - tobacco cessation  - follow up after injection to discuss results and options after she has quit smoking. I asked the patient to keep a pain diary to document the result of the SNRB    Tejinder Gillespie MD

## 2019-10-18 NOTE — PROGRESS NOTES
Spine Surgery Consultation    REFERRING PHYSICIAN: Jennifer Allen   PRIMARY CARE PHYSICIAN: Aggie Zavala           Chief Complaint:   Consult (chronic back pain )      History of Present Illness:  Symptom Profile Including: location of symptoms, onset, severity, exacerbating/alleviating factors, previous treatments:        Julia Boudreaux is a 54 year old female who presents for evaluation of left sided low back pain and left leg radiculopathy symptoms.  Patient's left leg pain has been present for about 3 to 4 years.  Pain localized on lateral aspect of thigh and associated with numbness on lateral aspect of calf into lateral foot.  Pain in the low back and leg worse with sitting or standing for too long.  Improved with laying down.  Patient is more bothered by pain in her back than the pain in her leg; rated as 10/10 today.  She says that the pain in her left leg is similar to pain that she had in her right leg prior to previous discectomies at L4-L5 and L5-S1 in the past.  She says that after the surgeries, her right leg pain was relieved.  Patient has not tried any recent physical therapy.  Patient has not had any recent epidural steroid injections.  Patient is on oxycodone as needed, 1200mg gabapentin TID, Mobic, and baclofen for pain relief. Also uses medical marijuana for pain relief    PMH:  Previous 2 discectomies were done at RIGHT L4-L5 and L5-S1 in 2008, 2016 with Dr. Lundy (now retired) in [facility].  Lung cancer.  Completed radiation 2 weeks ago.    Social  Lives in [place] at home with family.  Currently on disability, does not work.  Denies alcohol use  Tobacco: Smokes 1/2-3/4 pack per day  Illicit drug use: medical marijuana         Past Medical History:     Past Medical History:   Diagnosis Date     'light-for-dates' infant with signs of fetal malnutrition      Abdominal aortic aneurysm (H)      Abnormal Papanicolaou smear of cervix and cervical HPV     Abn. Pap smear (cervix)      Accident      Calculus of kidney      Displacement of lumbar intervertebral disc without myelopathy 3/29/08    Hospitalized     Endometriosis, site unspecified     Endometriosis     Lung cancer (H) 08/2019     Major depressive disorder, single episode, moderate (H)      Prolapse of vaginal vault after hysterectomy 2/23/13    Hospitalized            Past Surgical History:     Past Surgical History:   Procedure Laterality Date     BLADDER SURGERY  2010     C COMBINED ANT/POST COLPORRHAPHY  02/22/13     C LIGATE FALLOPIAN TUBE       CHOLECYSTECTOMY, LAPOROSCOPIC  08/27/2014     chondrectomy of the spine  03/01/2008    discectomy     HAND SURGERY  2016     IR CHEST TUBE PLACEMENT NON-TUNNELED RIGHT  8/12/2019     laminotomy for decompression and exploration  03/22/2016     rw back  11-1-12;11-15-12    2 nerves cut and cauterized in low back     ANDRES and BSO  2002    bleeding            Social History:     Social History     Tobacco Use     Smoking status: Current Every Day Smoker     Packs/day: 1.00     Years: 41.00     Pack years: 41.00     Smokeless tobacco: Never Used     Tobacco comment: declines quit line 2-12-13   Substance Use Topics     Alcohol use: Yes     Comment: rare            Family History:     Family History   Problem Relation Age of Onset     Diabetes Father         ETOH abuse     Cardiovascular Maternal Grandmother         stroke     Alzheimer Disease No family hx of      Cancer No family hx of         breast, colon, uterine, ovarian     Heart Disease No family hx of      Thyroid Disease No family hx of      Anesthesia Reaction No family hx of             Allergies:     Allergies   Allergen Reactions     Latex Rash     Liquid Adhesive Rash            Medications:     Current Outpatient Medications   Medication     aspirin-acetaminophen-caffeine (EXCEDRIN MIGRAINE) 250-250-65 MG tablet     baclofen (LIORESAL) 10 MG tablet     diazepam (VALIUM) 5 MG tablet     DULoxetine (CYMBALTA) 60 MG capsule      gabapentin (NEURONTIN) 600 MG tablet     HEMP OIL OR EXTRACT OR OTHER CBD CANNABINOID, NOT MEDICAL CANNABIS,     meloxicam (MOBIC) 7.5 MG tablet     NICOTROL 10 MG inhaler     omeprazole 20 MG tablet     ondansetron (ZOFRAN ODT) 4 MG ODT tab     order for DME     oxyCODONE (ROXICODONE) 5 MG tablet     Polyethylene Glycol 3350 (PEG 3350) POWD     No current facility-administered medications for this visit.              Review of Systems:     A 10 point ROS was performed and reviewed. Specific responses to these questions are noted at the end of the document.         Physical Exam:   PHYSICAL EXAM:   Constitutional - Patient appears stated age. Patient is very thin.   Respiratory - Patient is breathing normally and in no respiratory distress.   Skin - No suspicious rashes or lesions.   Psychiatric - Normal mood and affect.   Cardiovascular - Extremities warm and well perfused.   Eyes - Visual acuity is normal to the written word.   ENT - Hearing intact to the spoken word.   GI - No abdominal distention.   Musculoskeletal - patient has difficulty walking and standing from seated position due to low back and left leg pain        Thoracic Spine:    Appearance - Normal    Palpation - Non-tender to palpation    Strength/ROM - deferred            Lumbar Spine:    Appearance - Normal     Palpation - Non-tender to palpation midline. Tender to palpation of right PSIS area    ROM - Full     Motor - patient has generalized muscle weakness but is weaker with leftplantarflexion and great toe extension       LOWER EXTREMITY Left Right   Hip flexion 4+/5 4+/5   Knee flexion 4+/5 4+/5   Knee extension 4+/5 4+/5   Ankle dorsiflexion 4+/5 4+/5   Ankle plantarflexion 4/5 4+/5   Great toe extension 4/5 4+/5        Special tests -     Straight leg raise - Positive left /Negative right     Neurologic - Sensation intact to light touch bilaterally.      REFLEXES Left Right             clonus 0 beats  0 beats   Patella 1+ 1+   Ankle jerk 1+  1+                           Imaging:   We ordered and independently reviewed new radiographs at this clinic visit. The results were discussed with the patient.  Findings include:    October 18, 2019 lateral flexion-extension lumbar radiographs as well as anterior posterior view shows L5-S1 spondylosis with mild foraminal stenosis     lumbar MRI May 29, 2019 shows moderate diffuse degenerative changes with some mild foraminal stenosis at L5-S1 with no severe neurologic compression, evidence of previous lumbar decompressions as noted on the axial views             Assessment and Plan:   Assessment:  54 year old female with left low back pain and left leg radicular symptoms L5 distribution.  Imaging findings are mild to moderate.     Plan:  Patient with low back pain and left leg radicular symptoms that appear to be in an L5 distribution.  Lumbar MRI reviewed today does not show significant nerve compression, other than some mild narrowing at left L5 nerve root, to explain these symptoms. It is hard to tell whether this narrowing is contributing to patient's current symptoms or not.  Therefore recommend getting left L5 selective nerve root block for both diagnostic and therapeutic reasons.  If this injection hopes relieve her current symptoms, then it is more likely that surgery would be helpful.  We would also recommend her trying physical therapy, specifically pool therapy, to help exhaust conservative management of this problem before proceeding with potential surgical intervention.  Also highly recommended that patient quit smoking because if she were to need surgical intervention, it would need to be a fusion type surgery.  For fusion surgery to heal, need good bone growth, and tobacco/nicotine is known to interfere with bone growth.   - left L5 SNRB  - pool therapy referral  - tobacco cessation  - follow up after injection to discuss results and options after she has quit smoking. I asked the patient to keep a  pain diary to document the result of the SNRB    Tejinder Gillespie MD  Answers for HPI/ROS submitted by the patient on 10/18/2019   General Symptoms: Yes  Skin Symptoms: Yes  HENT Symptoms: Yes  EYE SYMPTOMS: Yes  HEART SYMPTOMS: No  LUNG SYMPTOMS: Yes  INTESTINAL SYMPTOMS: Yes  URINARY SYMPTOMS: No  GYNECOLOGIC SYMPTOMS: No  BREAST SYMPTOMS: No  SKELETAL SYMPTOMS: Yes  BLOOD SYMPTOMS: Yes  NERVOUS SYSTEM SYMPTOMS: Yes  MENTAL HEALTH SYMPTOMS: Yes  Fever: No  Loss of appetite: Yes  Weight loss: Yes  Weight gain: No  Fatigue: Yes  Night sweats: Yes  Chills: No  Increased stress: Yes  Excessive hunger: No  Excessive thirst: No  Feeling hot or cold when others believe the temperature is normal: Yes  Loss of height: No  Post-operative complications: No  Surgical site pain: No  Hallucinations: No  Change in or Loss of Energy: Yes  Hyperactivity: No  Confusion: No  Changes in hair: Yes  Changes in moles/birth marks: Yes  Itching: Yes  Rashes: Yes  Changes in nails: No  Acne: No  Hair in places you don't want it: No  Change in facial hair: No  Warts: No  Non-healing sores: No  Scarring: No  Flaking of skin: Yes  Color changes of hands/feet in cold : No  Sun sensitivity: No  Skin thickening: No  Ear pain: No  Ear discharge: No  Hearing loss: No  Tinnitus: No  Nosebleeds: No  Congestion: No  Sinus pain: No  Trouble swallowing: No   Voice hoarseness: No  Mouth sores: Yes  Sore throat: No  Tooth pain: No  Gum tenderness: Yes  Bleeding gums: No  Change in taste: No  Change in sense of smell: No  Dry mouth: Yes  Hearing aid used: No  Neck lump: No  Eye pain: No  Vision loss: Yes  Dry eyes: No  Watery eyes: No  Eye bulging: No  Double vision: No  Flashing of lights: No  Spots: No  Floaters: No  Redness: No  Crossed eyes: No  Tunnel Vision: No  Yellowing of eyes: No  Eye irritation: No  Cough: No  Sputum or phlegm: No  Coughing up blood: No  Difficulty breating or shortness of breath: No  Snoring: Yes  Wheezing:  No  Difficulty breathing on exertion: Yes  Nighttime Cough: No  Difficulty breathing when lying flat: No  Heart burn or indigestion: No  Nausea: Yes  Vomiting: No  Abdominal pain: No  Bloating: No  Constipation: Yes  Diarrhea: No  Blood in stool: No  Black stools: No  Rectal or Anal pain: No  Fecal incontinence: No  Yellowing of skin or eyes: No  Vomit with blood: No  Change in stools: No  Back pain: Yes  Muscle aches: Yes  Neck pain: Yes  Swollen joints: Yes  Joint pain: Yes  Bone pain: Yes  Muscle cramps: Yes  Muscle weakness: Yes  Joint stiffness: Yes  Bone fracture: Yes  Anemia: No  Swollen glands: No  Easy bleeding or bruising: Yes  Edema or swelling: No  Trouble with coordination: Yes  Dizziness or trouble with balance: Yes  Fainting or black-out spells: No  Memory loss: No  Headache: No  Seizures: No  Speech problems: No  Tingling: Yes  Tremor: No  Weakness: Yes  Difficulty walking: Yes  Paralysis: No  Numbness: Yes  Nervous or Anxious: Yes  Depression: Yes  Trouble sleeping: No  Trouble thinking or concentrating: Yes  Mood changes: Yes  Panic attacks: No

## 2019-10-18 NOTE — NURSING NOTE
"Reason For Visit:   Chief Complaint   Patient presents with     Consult     chronic back pain        Primary MD: Aggie Zavala  Ref. MD: Tiffany     ?  No  Occupation disabled .  Currently working? No.  Work status?  On disability.  Date of injury: since 2008  Type of injury: chronic  .  Date of surgery: 2016   Type of surgery: disc herniation.  Smoker: Yes  Request smoking cessation information: No    Ht 1.651 m (5' 5\")   Wt 47.2 kg (104 lb)   BMI 17.31 kg/m      Pain Assessment  Patient Currently in Pain: Yes  0-10 Pain Scale: 10(left sided low back and radiating down the left leg )    Oswestry (JOVAN) Questionnaire    OSWESTRY DISABILITY INDEX 10/18/2019   Count 10   Sum 30   Oswestry Score (%) 60   Some recent data might be hidden            Neck Disability Index (NDI) Questionnaire    No flowsheet data found.                Promis 10 Assessment    PROMIS 10 10/18/2019   In general, would you say your health is: Poor   In general, would you say your quality of life is: Fair   In general, how would you rate your physical health? Poor   In general, how would you rate your mental health, including your mood and your ability to think? Fair   In general, how would you rate your satisfaction with your social activities and relationships? Poor   In general, please rate how well you carry out your usual social activities and roles Poor   To what extent are you able to carry out your everyday physical activities such as walking, climbing stairs, carrying groceries, or moving a chair? A little   How often have you been bothered by emotional problems such as feeling anxious, depressed or irritable? Always   How would you rate your fatigue on average? Severe   How would you rate your pain on average?   0 = No Pain  to  10 = Worst Imaginable Pain 10   In general, would you say your health is: 1   In general, would you say your quality of life is: 2   In general, how would you rate your physical " health? 1   In general, how would you rate your mental health, including your mood and your ability to think? 2   In general, how would you rate your satisfaction with your social activities and relationships? 1   In general, please rate how well you carry out your usual social activities and roles. (This includes activities at home, at work and in your community, and responsibilities as a parent, child, spouse, employee, friend, etc.) 1   To what extent are you able to carry out your everyday physical activities such as walking, climbing stairs, carrying groceries, or moving a chair? 2   In the past 7 days, how often have you been bothered by emotional problems such as feeling anxious, depressed, or irritable? 5   In the past 7 days, how would you rate your fatigue on average? 4   In the past 7 days, how would you rate your pain on average, where 0 means no pain, and 10 means worst imaginable pain? 10   Global Mental Health Score 6   Global Physical Health Score 6   PROMIS TOTAL - SUBSCORES 12   Some recent data might be hidden                Tim Jean Baptiste ATC

## 2019-10-18 NOTE — PROGRESS NOTES
Spine Surgery Consultation    REFERRING PHYSICIAN: Jennifer Allen   PRIMARY CARE PHYSICIAN: Aggie Zavala           Chief Complaint:   Consult (chronic back pain )      History of Present Illness:  Symptom Profile Including: location of symptoms, onset, severity, exacerbating/alleviating factors, previous treatments:        Julia Boudreaux is a 54 year old female who presents for evaluation of left sided low back pain and left leg radiculopathy symptoms.  Patient's left leg pain has been present for about 3 to 4 years.  Pain localized on lateral aspect of thigh and associated with numbness on lateral aspect of calf into lateral foot.  Pain in the low back and leg worse with sitting or standing for too long.  Improved with laying down.  Patient is more bothered by pain in her back than the pain in her leg; rated as 10/10 today.  She says that the pain in her left leg is similar to pain that she had in her right leg prior to previous discectomies at L4-L5 and L5-S1 in the past.  She says that after the surgeries, her right leg pain was relieved.  Patient has not tried any recent physical therapy.  Patient has not had any recent epidural steroid injections.  Patient is on oxycodone as needed, 1200mg gabapentin TID, Mobic, and baclofen for pain relief. Also uses medical marijuana for pain relief    PMH:  Previous 2 discectomies were done at RIGHT L4-L5 and L5-S1 in 2008, 2016 with Dr. Lundy (now retired) in [facility].  Lung cancer.  Completed radiation 2 weeks ago.    Social  Lives in [place] at home with family.  Currently on disability, does not work.  Denies alcohol use  Tobacco: Smokes 1/2-3/4 pack per day  Illicit drug use: medical marijuana         Past Medical History:     Past Medical History:   Diagnosis Date     'light-for-dates' infant with signs of fetal malnutrition      Abdominal aortic aneurysm (H)      Abnormal Papanicolaou smear of cervix and cervical HPV     Abn. Pap smear (cervix)      Accident      Calculus of kidney      Displacement of lumbar intervertebral disc without myelopathy 3/29/08    Hospitalized     Endometriosis, site unspecified     Endometriosis     Lung cancer (H) 08/2019     Major depressive disorder, single episode, moderate (H)      Prolapse of vaginal vault after hysterectomy 2/23/13    Hospitalized            Past Surgical History:     Past Surgical History:   Procedure Laterality Date     BLADDER SURGERY  2010     C COMBINED ANT/POST COLPORRHAPHY  02/22/13     C LIGATE FALLOPIAN TUBE       CHOLECYSTECTOMY, LAPOROSCOPIC  08/27/2014     chondrectomy of the spine  03/01/2008    discectomy     HAND SURGERY  2016     IR CHEST TUBE PLACEMENT NON-TUNNELED RIGHT  8/12/2019     laminotomy for decompression and exploration  03/22/2016     rw back  11-1-12;11-15-12    2 nerves cut and cauterized in low back     ANDRES and BSO  2002    bleeding            Social History:     Social History     Tobacco Use     Smoking status: Current Every Day Smoker     Packs/day: 1.00     Years: 41.00     Pack years: 41.00     Smokeless tobacco: Never Used     Tobacco comment: declines quit line 2-12-13   Substance Use Topics     Alcohol use: Yes     Comment: rare            Family History:     Family History   Problem Relation Age of Onset     Diabetes Father         ETOH abuse     Cardiovascular Maternal Grandmother         stroke     Alzheimer Disease No family hx of      Cancer No family hx of         breast, colon, uterine, ovarian     Heart Disease No family hx of      Thyroid Disease No family hx of      Anesthesia Reaction No family hx of             Allergies:     Allergies   Allergen Reactions     Latex Rash     Liquid Adhesive Rash            Medications:     Current Outpatient Medications   Medication     aspirin-acetaminophen-caffeine (EXCEDRIN MIGRAINE) 250-250-65 MG tablet     baclofen (LIORESAL) 10 MG tablet     diazepam (VALIUM) 5 MG tablet     DULoxetine (CYMBALTA) 60 MG capsule      gabapentin (NEURONTIN) 600 MG tablet     HEMP OIL OR EXTRACT OR OTHER CBD CANNABINOID, NOT MEDICAL CANNABIS,     meloxicam (MOBIC) 7.5 MG tablet     NICOTROL 10 MG inhaler     omeprazole 20 MG tablet     ondansetron (ZOFRAN ODT) 4 MG ODT tab     order for DME     oxyCODONE (ROXICODONE) 5 MG tablet     Polyethylene Glycol 3350 (PEG 3350) POWD     No current facility-administered medications for this visit.              Review of Systems:     A 10 point ROS was performed and reviewed. Specific responses to these questions are noted at the end of the document.         Physical Exam:   PHYSICAL EXAM:   Constitutional - Patient appears stated age. Patient is very thin.   Respiratory - Patient is breathing normally and in no respiratory distress.   Skin - No suspicious rashes or lesions.   Psychiatric - Normal mood and affect.   Cardiovascular - Extremities warm and well perfused.   Eyes - Visual acuity is normal to the written word.   ENT - Hearing intact to the spoken word.   GI - No abdominal distention.   Musculoskeletal - patient has difficulty walking and standing from seated position due to low back and left leg pain        Thoracic Spine:    Appearance - Normal    Palpation - Non-tender to palpation    Strength/ROM - deferred            Lumbar Spine:    Appearance - Normal     Palpation - Non-tender to palpation midline. Tender to palpation of right PSIS area    ROM - Full     Motor - patient has generalized muscle weakness but is weaker with leftplantarflexion and great toe extension       LOWER EXTREMITY Left Right   Hip flexion 4+/5 4+/5   Knee flexion 4+/5 4+/5   Knee extension 4+/5 4+/5   Ankle dorsiflexion 4+/5 4+/5   Ankle plantarflexion 4/5 4+/5   Great toe extension 4/5 4+/5        Special tests -     Straight leg raise - Positive left /Negative right     Neurologic - Sensation intact to light touch bilaterally.      REFLEXES Left Right             clonus 0 beats  0 beats   Patella 1+ 1+   Ankle jerk 1+  1+                           Imaging:   We ordered and independently reviewed new radiographs at this clinic visit. The results were discussed with the patient.  Findings include:  October 18, 2019 lateral flexion-extension lumbar radiographs as well as anterior posterior view shows L5-S1 spondylosis with mild foraminal stenosis     lumbar MRI May 29, 2019 shows moderate diffuse degenerative changes with some mild foraminal stenosis at L5-S1 with no severe neurologic compression evidence of previous lumbar decompressions as noted on the axial views             Assessment and Plan:   Assessment:  54 year old female with left low back pain and left leg radicular symptoms L5 distribution.     Plan:  Patient with low back pain and left leg radicular symptoms that appear to be in an L5 distribution.  Lumbar MRI reviewed today does not show significant nerve compression, other than some mild narrowing at left L5 nerve root, to explain these symptoms. It is hard to tell whether this narrowing is contributing to patient's current symptoms or not.  Therefore recommend getting left L5 selective nerve root block for both diagnostic and therapeutic reasons.  If this injection hopes relieve her current symptoms, then it is more likely that surgery would be helpful.  We would also recommend her trying physical therapy, specifically pool therapy, to help exhaust conservative management of this problem before proceeding with potential surgical intervention.  Also highly recommended that patient quit smoking because if she were to need surgical intervention, it would need to be a fusion type surgery.  For fusion surgery to heal, need good bone growth, and tobacco/nicotine is known to interfere with bone growth.   - left L5 SNRB  - pool therapy referral  - tobacco cessation  - follow up after injection to discuss results and options    Annika Granger PA-C    Respectfully,  Tejinder Gillespie MD  Spine Surgery  [facility]  [place]      Answers for HPI/ROS submitted by the patient on 10/18/2019   General Symptoms: Yes  Skin Symptoms: Yes  HENT Symptoms: Yes  EYE SYMPTOMS: Yes  HEART SYMPTOMS: No  LUNG SYMPTOMS: Yes  INTESTINAL SYMPTOMS: Yes  URINARY SYMPTOMS: No  GYNECOLOGIC SYMPTOMS: No  BREAST SYMPTOMS: No  SKELETAL SYMPTOMS: Yes  BLOOD SYMPTOMS: Yes  NERVOUS SYSTEM SYMPTOMS: Yes  MENTAL HEALTH SYMPTOMS: Yes  Fever: No  Loss of appetite: Yes  Weight loss: Yes  Weight gain: No  Fatigue: Yes  Night sweats: Yes  Chills: No  Increased stress: Yes  Excessive hunger: No  Excessive thirst: No  Feeling hot or cold when others believe the temperature is normal: Yes  Loss of height: No  Post-operative complications: No  Surgical site pain: No  Hallucinations: No  Change in or Loss of Energy: Yes  Hyperactivity: No  Confusion: No  Changes in hair: Yes  Changes in moles/birth marks: Yes  Itching: Yes  Rashes: Yes  Changes in nails: No  Acne: No  Hair in places you don't want it: No  Change in facial hair: No  Warts: No  Non-healing sores: No  Scarring: No  Flaking of skin: Yes  Color changes of hands/feet in cold : No  Sun sensitivity: No  Skin thickening: No  Ear pain: No  Ear discharge: No  Hearing loss: No  Tinnitus: No  Nosebleeds: No  Congestion: No  Sinus pain: No  Trouble swallowing: No   Voice hoarseness: No  Mouth sores: Yes  Sore throat: No  Tooth pain: No  Gum tenderness: Yes  Bleeding gums: No  Change in taste: No  Change in sense of smell: No  Dry mouth: Yes  Hearing aid used: No  Neck lump: No  Eye pain: No  Vision loss: Yes  Dry eyes: No  Watery eyes: No  Eye bulging: No  Double vision: No  Flashing of lights: No  Spots: No  Floaters: No  Redness: No  Crossed eyes: No  Tunnel Vision: No  Yellowing of eyes: No  Eye irritation: No  Cough: No  Sputum or phlegm: No  Coughing up blood: No  Difficulty breating or shortness of breath: No  Snoring: Yes  Wheezing: No  Difficulty breathing on exertion: Yes  Nighttime Cough: No  Difficulty  breathing when lying flat: No  Heart burn or indigestion: No  Nausea: Yes  Vomiting: No  Abdominal pain: No  Bloating: No  Constipation: Yes  Diarrhea: No  Blood in stool: No  Black stools: No  Rectal or Anal pain: No  Fecal incontinence: No  Yellowing of skin or eyes: No  Vomit with blood: No  Change in stools: No  Back pain: Yes  Muscle aches: Yes  Neck pain: Yes  Swollen joints: Yes  Joint pain: Yes  Bone pain: Yes  Muscle cramps: Yes  Muscle weakness: Yes  Joint stiffness: Yes  Bone fracture: Yes  Anemia: No  Swollen glands: No  Easy bleeding or bruising: Yes  Edema or swelling: No  Trouble with coordination: Yes  Dizziness or trouble with balance: Yes  Fainting or black-out spells: No  Memory loss: No  Headache: No  Seizures: No  Speech problems: No  Tingling: Yes  Tremor: No  Weakness: Yes  Difficulty walking: Yes  Paralysis: No  Numbness: Yes  Nervous or Anxious: Yes  Depression: Yes  Trouble sleeping: No  Trouble thinking or concentrating: Yes  Mood changes: Yes  Panic attacks: No

## 2019-10-18 NOTE — PROGRESS NOTES
Spine Surgery Consultation    REFERRING PHYSICIAN: Jennifer Allen   PRIMARY CARE PHYSICIAN: Aggie Zavala           Chief Complaint:   No chief complaint on file.      History of Present Illness:  Symptom Profile Including: location of symptoms, onset, severity, exacerbating/alleviating factors, previous treatments:        Julia Boudreaux is a 54 year old female who presents today for evaluation of ***    Past treatments tried:  - Physical therapy: ***  - Injections: ***  - Medications: ***    PMH:  - lung cancer***  -   Social:  ***         Past Medical History:     Past Medical History:   Diagnosis Date     'light-for-dates' infant with signs of fetal malnutrition      Abdominal aortic aneurysm (H)      Abnormal Papanicolaou smear of cervix and cervical HPV     Abn. 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    Hospitalized            Past Surgical History:     Past Surgical History:   Procedure Laterality Date     BLADDER SURGERY  2010     C COMBINED ANT/POST COLPORRHAPHY  02/22/13     C LIGATE FALLOPIAN TUBE       CHOLECYSTECTOMY, LAPOROSCOPIC  08/27/2014     chondrectomy of the spine  03/01/2008    discectomy     HAND SURGERY  2016     IR CHEST TUBE PLACEMENT NON-TUNNELED RIGHT  8/12/2019     laminotomy for decompression and exploration  03/22/2016     rw back  11-1-12;11-15-12    2 nerves cut and cauterized in low back     ANDRES and BSO  2002    bleeding            Social History:     Social History     Tobacco Use     Smoking status: Current Every Day Smoker     Packs/day: 1.00     Years: 41.00     Pack years: 41.00     Smokeless tobacco: Never Used     Tobacco comment: declines quit line 2-12-13    Substance Use Topics     Alcohol use: Yes     Comment: rare            Family History:     Family History   Problem Relation Age of Onset     Diabetes Father         ETOH abuse     Cardiovascular Maternal Grandmother         stroke     Alzheimer Disease No family hx of      Cancer No family hx of         breast, colon, uterine, ovarian     Heart Disease No family hx of      Thyroid Disease No family hx of      Anesthesia Reaction No family hx of             Allergies:     Allergies   Allergen Reactions     Latex Rash     Liquid Adhesive Rash            Medications:     Current Outpatient Medications   Medication     aspirin-acetaminophen-caffeine (EXCEDRIN MIGRAINE) 250-250-65 MG tablet     baclofen (LIORESAL) 10 MG tablet     diazepam (VALIUM) 5 MG tablet     DULoxetine (CYMBALTA) 60 MG capsule     gabapentin (NEURONTIN) 600 MG tablet     HEMP OIL OR EXTRACT OR OTHER CBD CANNABINOID, NOT MEDICAL CANNABIS,     meloxicam (MOBIC) 7.5 MG tablet     NICOTROL 10 MG inhaler     omeprazole 20 MG tablet     ondansetron (ZOFRAN ODT) 4 MG ODT tab     order for DME     oxyCODONE (ROXICODONE) 5 MG tablet     Polyethylene Glycol 3350 (PEG 3350) POWD     No current facility-administered medications for this visit.              Review of Systems:     A 10 point ROS was performed and reviewed. Specific responses to these questions are noted at the end of the document.         Physical Exam:     PHYSICAL EXAM:   Constitutional - Patient is healthy, well-nourished and appears stated age***.    Vitals: There were no vitals taken for this visit.   Respiratory - Patient is breathing normally and in no respiratory distress***.   Skin - No suspicious rashes or lesions***.   Psychiatric - Normal mood and affect***.   Cardiovascular - Extremities warm and well perfused***.   Eyes - Visual acuity is normal to the written word***.   ENT - Hearing intact to the spoken word***.   GI - No abdominal distention***.   Musculoskeletal -  Non-antalgic gait without use of assistive devices***.        Cervical spine:    Appearance - Normal (loss of normal lordosis?***)    Palpation - Non-tender to palpation     (C-spine?, paraspinals?, trapezius?***)    ROM - Full      (Flex/ext, lat flexion, lat rotation, +/- pain?***)    Motor -     UPPER EXTREMITY Left Right    strength ***/5 ***/5   Finger ab/adduction ***/5 ***/5   Wrist flexion ***/5 ***/5   Wrist extension ***/5 ***/5   Elbow flexion ***/5 ***/5   Elbow extension ***/5 ***/5   Shoulder flexion ***/5 ***/5         Special Tests -      C-spine instability - Positive/Negative***     Cervical axial load - Positive/Negative***     Lhermitte's Test - Positive/Negative***     Spurling's Test - Positive/Negative***          Thoracic Spine:    Appearance - Normal (loss of normal kyphosis?***)    Palpation - Non-tender to palpation     (T-spine?, paraspinals?***)    Strength/ROM - deferred            Lumbar Spine:    Appearance - Normal (loss of normal lordosis?***)    Palpation - Non-tender to palpation    ROM - Full      (Flex/ext, lat flexion, lat rotation, +/- pain?***)    Motor -        LOWER EXTREMITY Left Right   Hip flexion ***/5 ***/5   Knee flexion ***/5 ***/5   Knee extension ***/5 ***/5   Ankle dorsiflexion ***/5 ***/5   Ankle plantarflexion ***/5 ***/5   Great toe extension ***/5 ***/5        Special tests -     Straight leg raise - Positive/Negative***     SNOW - Positive/Negative***     Pain with hip ROM - Positive/Negative***     Neurologic - Sensation intact to light touch bilaterally***.      REFLEXES Left Right   Biceps ***+ ***+   Triceps ***+ ***+   Brachioradialis ***+ ***+   Patella ***+ ***+   Ankle jerk ***+ ***+   Babinski Present? Present?     Hip Exam:  No pain with hip log roll and no tenderness over the greater trochanters.    Alignment:  Patient stands with a neutral standing sagittal balance.         Imaging:   We ordered and independently reviewed new radiographs at  this clinic visit. The results were discussed with the patient.  Findings include:    ***             Assessment and Plan:   Assessment:  54 year old female with ***     Plan:  1. ***      Annika Granger PA-C    Respectfully,  Tejinder Gillespie MD  Spine Surgery  [facility]

## 2019-11-25 ENCOUNTER — TELEPHONE (OUTPATIENT)
Dept: INTERVENTIONAL RADIOLOGY/VASCULAR | Facility: CLINIC | Age: 54
End: 2019-11-25

## 2019-11-25 NOTE — TELEPHONE ENCOUNTER
LM for patient about upcomming appointment and left return number if patient had any questions or concerns.  Olga Wadsworth RN, BSN

## 2019-12-04 ENCOUNTER — HOSPITAL ENCOUNTER (OUTPATIENT)
Dept: GENERAL RADIOLOGY | Facility: CLINIC | Age: 54
Discharge: HOME | End: 2019-12-04
Attending: ORTHOPAEDIC SURGERY | Admitting: ORTHOPAEDIC SURGERY
Payer: MEDICARE

## 2019-12-04 VITALS — SYSTOLIC BLOOD PRESSURE: 141 MMHG | HEART RATE: 81 BPM | DIASTOLIC BLOOD PRESSURE: 80 MMHG

## 2019-12-04 DIAGNOSIS — M54.50 LUMBAR PAIN: ICD-10-CM

## 2019-12-04 PROCEDURE — 25500064 ZZH RX 255 OP 636: Performed by: PHYSICIAN ASSISTANT

## 2019-12-04 PROCEDURE — 64483 NJX AA&/STRD TFRM EPI L/S 1: CPT

## 2019-12-04 PROCEDURE — 25000125 ZZHC RX 250: Performed by: PHYSICIAN ASSISTANT

## 2019-12-04 PROCEDURE — 25000125 ZZHC RX 250

## 2019-12-04 PROCEDURE — 25000128 H RX IP 250 OP 636

## 2019-12-04 RX ORDER — LIDOCAINE HYDROCHLORIDE 10 MG/ML
INJECTION, SOLUTION EPIDURAL; INFILTRATION; INTRACAUDAL; PERINEURAL
Status: COMPLETED
Start: 2019-12-04 | End: 2019-12-04

## 2019-12-04 RX ORDER — LIDOCAINE HYDROCHLORIDE 10 MG/ML
5 INJECTION, SOLUTION EPIDURAL; INFILTRATION; INTRACAUDAL; PERINEURAL ONCE
Status: COMPLETED | OUTPATIENT
Start: 2019-12-04 | End: 2019-12-04

## 2019-12-04 RX ORDER — LIDOCAINE HYDROCHLORIDE 10 MG/ML
3 INJECTION, SOLUTION EPIDURAL; INFILTRATION; INTRACAUDAL; PERINEURAL ONCE
Status: COMPLETED | OUTPATIENT
Start: 2019-12-04 | End: 2019-12-04

## 2019-12-04 RX ORDER — DEXAMETHASONE SODIUM PHOSPHATE 10 MG/ML
10 INJECTION, SOLUTION INTRAMUSCULAR; INTRAVENOUS ONCE
Status: COMPLETED | OUTPATIENT
Start: 2019-12-04 | End: 2019-12-04

## 2019-12-04 RX ORDER — DEXAMETHASONE SODIUM PHOSPHATE 10 MG/ML
INJECTION, SOLUTION INTRAMUSCULAR; INTRAVENOUS
Status: COMPLETED
Start: 2019-12-04 | End: 2019-12-04

## 2019-12-04 RX ORDER — IOPAMIDOL 408 MG/ML
10 INJECTION, SOLUTION INTRATHECAL ONCE
Status: COMPLETED | OUTPATIENT
Start: 2019-12-04 | End: 2019-12-04

## 2019-12-04 RX ADMIN — DEXAMETHASONE SODIUM PHOSPHATE 10 MG: 10 INJECTION, SOLUTION INTRAMUSCULAR; INTRAVENOUS at 11:12

## 2019-12-04 RX ADMIN — LIDOCAINE HYDROCHLORIDE 30 ML: 10 INJECTION, SOLUTION EPIDURAL; INFILTRATION; INTRACAUDAL; PERINEURAL at 11:10

## 2019-12-04 RX ADMIN — IOPAMIDOL 10 ML: 408 INJECTION, SOLUTION INTRATHECAL at 11:11

## 2019-12-04 RX ADMIN — LIDOCAINE HYDROCHLORIDE 30 ML: 10 INJECTION, SOLUTION EPIDURAL; INFILTRATION; INTRACAUDAL; PERINEURAL at 11:11

## 2019-12-04 NOTE — PROGRESS NOTES
RADIOLOGY PROCEDURE NOTE  Patient name: Julia Boudreaux  MRN: [id number]  : 1965    Pre-procedure diagnosis: Low back pain, LLE radiculopathy  Post-procedure diagnosis: Same    Procedure Date/Time: 2019  11:17 AM  Procedure: Left L5 SNRB  Estimated blood loss: None  Specimen(s) collected with description: none  The patient tolerated the procedure well with no immediate complications.  Significant findings:none    See imaging dictation for procedural details.    Provider name: KESHAWN Hernandez  Assistant(s):None

## 2019-12-04 NOTE — PROGRESS NOTES
Pt was in Radiology today for a left L5 SNR. Pt tolerated ortho procedure well. Pre procedure pain was 10 post procedure pain level 3.Procedure was completed by FLORENCE LIAO. There were no complications during this procedure. Pt verbalized understanding of written and verbal instructions and left department in stable and satisfactory condition with FLORENCE LIAO. There is no evidence of bleeding or any other complications upon discharge.

## 2019-12-27 ENCOUNTER — HOSPITAL ENCOUNTER (OUTPATIENT)
Dept: CT IMAGING | Facility: CLINIC | Age: 54
Discharge: HOME | End: 2019-12-27
Attending: RADIOLOGY | Admitting: RADIOLOGY
Payer: MEDICARE

## 2019-12-27 DIAGNOSIS — C34.11 MALIGNANT NEOPLASM OF RIGHT UPPER LOBE OF LUNG (H): ICD-10-CM

## 2019-12-27 LAB — RADIOLOGIST FLAGS: NORMAL

## 2019-12-27 PROCEDURE — 71250 CT THORAX DX C-: CPT

## 2019-12-31 ENCOUNTER — DOCUMENTATION ONLY (OUTPATIENT)
Dept: RADIATION ONCOLOGY | Facility: HOSPITAL | Age: 54
End: 2019-12-31

## 2019-12-31 NOTE — PROGRESS NOTES
RADIATION ONCOLOGY FOLLOW UP NOTE      PATIENT NAME: Julia Boudreaux    DISEASE TREATED: Adenocarcinoma of the RUL(Lung)            INTERVAL SINCE COMPLETION OF RADIOTHERAPY: 3months   (completed on 10/4/2019).     TYPE OF RADIOTHERAPY GIVEN:   5000 cGy in 5 Fractions , SBRT to 85% IDL        SUBJECTIVE:  Ms. Boudreaux is a 54 year old woman, a current smoker, who initially sought medical attention for evaluation of unexplained weight loss (30 Ibs over the past year) in May 2019.      She was evaluated by CT chest abdomen pelvis, at [place], [place], on 5/20/219 which demonstrated an incidental 0.9 x 2.2 x 12 cm right upper lobe nodule as well as mildly prominent right hilar lymph node measuring 0.9 x 1.1 cm. The patient was then referred to Pulmonary Medicine at [facility] for further evaluation. She underwent a staging PET/CT on 7/18/2019 which redemonstrated a 2.3x 0.6 cm hypermetabolic pleural based right upper lobe nodule (SUV max 9.8) as well as additional non-FDG avid left apical nodule. There was no evidence of hypermetabolic mediastinal adenopathy or metastatic disease.      The patient then was seen by Dr. Johnston on 7/25/19. The recommendation was to proceed with surgical resection. She had a pulmonary function test which showed FEV1 90% (2.43L) and DLCO 82% (17.61 ml/min/mmHg). She then underwent a CT guided biopsy from the RUL lesion on 8/12/19. The pathology ([id number]) showed invasive adenocarcinoma, acinar predominant. The tumor cells were positive for TTF-1, but negative for Napsin A, cytokeratin CK-5/6, and p63, supporting the diagnosis. PD-L1 testing was highly positive (80%). The molecular genetic testing ([id number]) revealed no mutation in the BRAF, EGFR, ERBB2, KRAS, MET, NRAS, RET genes. Her procedure course was complicated by pneumothorax for which she was admitted from 8/12 through 8/15/19. She underwent placement of chest tube and discharged in a stable condition. The patient was  reluctant to pursue surgery with her many co-morbidities and ambulation issues.     The patient underwent SBRT and was to return on 12/31/2019 for a follow up but was not able to drive due to snow.      IMAGING:     3M prior to SBRT                     3M post SBRT(12/27/2019)    Chest CT (12/27/2019):   1. Reduced size of the known, previously hypermetabolic right upper lobe nodule as measured above with  associated right apical postradiation changes.  2.  Stable left upper lobe nodule as measured above. No new or suspicious pulmonary nodules.  3. Multinodular thyroid with a 1.5 cm peripherally calcified left thyroid nodule. Consider dedicated  thyroid  ultrasound      IMPRESSION OF DISEASE STATUS: Clinical No Evidence of Disease(JAUN), Clinical Partial Response(>30% but less than Complete Response)  A  RECOMMENDATION:  I discussed the CT scan findings with the patient including the thyroid reading. The patient informed me that she had some work up done in the past for thyroid nodules. She will also inform her primary care physician and we will call her office as well.       Follow up in 3 months with a non-contrast CT scan of chest    REYMUNDO Nj M.D.  Department of Radiation Oncology  [facility]  [phone]

## 2020-01-28 ENCOUNTER — TRANSFERRED RECORDS (OUTPATIENT)
Dept: HEALTH INFORMATION MANAGEMENT | Facility: CLINIC | Age: 55
End: 2020-01-28

## 2020-03-06 ENCOUNTER — MEDICAL CORRESPONDENCE (OUTPATIENT)
Dept: HEALTH INFORMATION MANAGEMENT | Facility: CLINIC | Age: 55
End: 2020-03-06

## 2020-03-15 ENCOUNTER — HEALTH MAINTENANCE LETTER (OUTPATIENT)
Age: 55
End: 2020-03-15

## 2020-06-04 ENCOUNTER — VIRTUAL VISIT (OUTPATIENT)
Dept: ENDOCRINOLOGY | Facility: CLINIC | Age: 55
End: 2020-06-04
Payer: MEDICARE

## 2020-06-04 DIAGNOSIS — E04.1 THYROID NODULE: Primary | ICD-10-CM

## 2020-06-04 PROCEDURE — 99443 ZZC PHYSICIAN TELEPHONE EVALUATION 21-30 MIN: CPT | Performed by: INTERNAL MEDICINE

## 2020-06-04 RX ORDER — DULOXETIN HYDROCHLORIDE 60 MG/1
CAPSULE, DELAYED RELEASE ORAL
COMMUNITY
Start: 2020-03-10 | End: 2020-06-04

## 2020-06-04 RX ORDER — GABAPENTIN 600 MG/1
1200 TABLET ORAL 3 TIMES DAILY
COMMUNITY
Start: 2019-12-10 | End: 2020-06-04

## 2020-06-04 RX ORDER — OXYCODONE AND ACETAMINOPHEN 5; 325 MG/1; MG/1
2 TABLET ORAL
COMMUNITY
Start: 2020-06-04

## 2020-06-04 RX ORDER — GABAPENTIN 600 MG/1
TABLET ORAL
COMMUNITY

## 2020-06-04 RX ORDER — CHLORZOXAZONE 500 MG/1
TABLET ORAL
COMMUNITY
Start: 2020-05-28

## 2020-06-04 RX ORDER — DIAZEPAM 5 MG
TABLET ORAL
COMMUNITY
Start: 2020-05-28

## 2020-06-04 NOTE — LETTER
"    6/4/2020         RE: Julia Boudreaux  [address] [facility] [address]        Dear Colleague,    Thank you for referring your patient, Julia Boudreaux, to the [facility]. Please see a copy of my visit note below.    This is a telephone encounter.  Pt declined video visit    Start time: 9:18    End time: 9:58    More than 30 min spent reviewing chart, labs, results, imaging studies and in patient communication. Also spent time reviewing records from outside clinic.    In the setting of COVID-19 outbreak patients visits are transitioned to phone visits/ virtual visits as per system and leadership guidelines.  I have personally reviewed data including notes, lab tests. I have reviewed and interpreted images personally.  This encounter is potentially equivalent to NEW level 5  (24482) clinic visit.    The patient has been notified of following:      \"This telephone visit will be conducted via a call between you and your physician/provider. We have found that certain health care needs can be provided without the need for a physical exam.  This service lets us provide the care you need with a short phone conversation.  If a prescription is necessary we can send it directly to your pharmacy.  If lab work is needed we can place an order for that and you can then stop by our lab to have the test done at a later time.     We will bill your insurance company for this service.  Please check with your medical insurance if this type of visit is covered. You may be responsible for the cost of this type of visit if insurance coverage is denied.  The typical cost is $30 (10min), $59 (11-20min) and $85 (21-30min).  Most often these visits are shorter than 10 minutes. With new updates with corona virus patient might be billed as clinic visit.     If during the course of the call the physician/provider feels a telephone visit is not appropriate, you will not be charged for this service.\"      Past medical " history, social history, family history, allergy and medications were reviewed and updated as appropriate.  Reviewed all pertinent labs, notes and imaging studies as appropriate.    Patient verbally consented to phone visit today: yes.    Disclaimer: This note consists of symbols derived from keyboarding, dictation and/or voice recognition software. As a result, there may be errors in the script that have gone undetected. Please consider this when interpreting information found in this chart.    ROS neg expect noted in notes.  Patient feels well at this time and denies any tachycardia, palpitations, heat intolerance, tremor, insomnia, diarrhea, or unexplained weight loss.  Patient also denies  cold intolerance, constipation, or unexplained weight gain.   Name: Julia Boudreaux  Seen in consultation with Aggie Zavala for thyroid nodule.   HPI:  Julia Boudreaux is a 55 year old female who presents for the evaluation of thyroid nodule.   has a past medical history of 'light-for-dates' infant with signs of fetal malnutrition, Abdominal aortic aneurysm (H), Abnormal Papanicolaou smear of cervix and cervical HPV, Accident, Calculus of kidney, Displacement of lumbar intervertebral disc without myelopathy (3/29/08), Endometriosis, site unspecified, Lung cancer (H) (08/2019), Major depressive disorder, single episode, moderate (H), and Prolapse of vaginal vault after hysterectomy (2/23/13).  H/o Adenocarcinoma of rt lung.  PCP is through [facility].  Available records, labs and images from outside clinic were personally reviewed.    She was in ER in 12/2019 and had CT chest which showed incidental finding of calcified thyroid nodule and multinodular thyroid gland.  This was followed by Thyroid US 1/2020 at [facility].    On review of chart known prior h/o thyroid nodules.  No images to review.    5/2019 US:  Multiple bilateral low suspicion thyroid nodules, a few of which on  the left are greater than 1.5 cm in  size. The largest nodule is in the left inferior thyroid measures 2.6 x 2.1 x 1.7 cm.  1/2020: Stable multiple thyroid nodules bilaterally. Largest nodule is in the left inferior thyroid measures 2.8 cm.    Never had FNA done.    TFT 1/2019 in range.    + radiation - getting radiation treatment for lung cancer. Had 5 t/t in 2019.  No FH of thyroid cancer.  No compressive s/s.  Thyroid labs in acceptable range.  She is not on thyroid hormone replacement.  No other major risk factors.    + wt loss.  + wt loss about 50 lbs in 1 year per her report.  Appetite is OK.  Is active.    FH of thyroid problem:  History of thyroid dysfunction: NO  Palpitations:  No  Changes to hair or skin: + brittle hairs.  Diarrhea/Constipation:No  Changes in menses: s/p hysterectomy  Dysphagia or Shortness of breath:sometimes with soup- but no problems with coffee or water or other solid food.  Tremors:No  Changes in weight: Yes: + wt loss about 50 lbs in 1 year per her report.    PMH/PSH:  Past Medical History:   Diagnosis Date     'light-for-dates' infant with signs of fetal malnutrition      Abdominal aortic aneurysm (H)      Abnormal Papanicolaou smear of cervix and cervical HPV     Abn. 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    Hospitalized     Past Surgical History:   Procedure Laterality Date     BLADDER SURGERY  2010     C COMBINED ANT/POST COLPORRHAPHY  02/22/13     C LIGATE FALLOPIAN TUBE       CHOLECYSTECTOMY, LAPOROSCOPIC  08/27/2014     chondrectomy of the spine  03/01/2008    discectomy     HAND SURGERY  2016     IR CHEST TUBE PLACEMENT NON-TUNNELED RIGHT  8/12/2019     laminotomy for decompression and exploration  03/22/2016      rw back  11-1-12;11-15-12    2 nerves cut and cauterized in low back     ANDRES and BSO  2002    bleeding     Family Hx:  Family History   Problem Relation Age of Onset     Diabetes Father         ETOH abuse     Cardiovascular Maternal Grandmother         stroke     Alzheimer Disease No family hx of      Cancer No family hx of         breast, colon, uterine, ovarian     Heart Disease No family hx of      Thyroid Disease No family hx of      Anesthesia Reaction No family hx of           Social Hx:  Social History     Socioeconomic History     Marital status:      Spouse name: Octavio     Number of children: 4     Years of education: Not on file     Highest education level: Not on file   Occupational History     Employer: HOMEMAKER   Social Needs     Financial resource strain: Not on file     Food insecurity     Worry: Not on file     Inability: Not on file     Transportation needs     Medical: Not on file     Non-medical: Not on file   Tobacco Use     Smoking status: Current Every Day Smoker     Packs/day: 1.00     Years: 41.00     Pack years: 41.00     Smokeless tobacco: Never Used     Tobacco comment: declines quit line 2-12-13   Substance and Sexual Activity     Alcohol use: Yes     Comment: rare     Drug use: No     Sexual activity: Yes     Partners: Male     Birth control/protection: Surgical   Lifestyle     Physical activity     Days per week: Not on file     Minutes per session: Not on file     Stress: Not on file   Relationships     Social connections     Talks on phone: Not on file     Gets together: Not on file     Attends Adventism service: Not on file     Active member of club or organization: Not on file     Attends meetings of clubs or organizations: Not on file     Relationship status: Not on file     Intimate partner violence     Fear of current or ex partner: Not on file     Emotionally abused: Not on file     Physically abused: Not on file     Forced sexual activity: Not on file   Other Topics  Concern      Service Not Asked     Blood Transfusions No     Caffeine Concern Yes     Occupational Exposure Not Asked     Hobby Hazards Not Asked     Sleep Concern No     Stress Concern No     Weight Concern Yes     Special Diet No     Back Care Yes     Exercise Yes     Bike Helmet Not Asked     Seat Belt Yes     Self-Exams Yes     Parent/sibling w/ CABG, MI or angioplasty before 65F 55M? Not Asked   Social History Narrative    Food manufacturing and dietary work, variety of manual labor, unloading           MEDICATIONS:  has a current medication list which includes the following prescription(s): aspirin-acetaminophen-caffeine, chlorzoxazone, diazepam, diclofenac, duloxetine, gabapentin, HEMP OIL OR EXTRACT OR OTHER CBD CANNABINOID, NOT MEDICAL CANNABIS,, meloxicam, ondansetron, order for dme, oxycodone-acetaminophen, and peg 3350.    Review of Systems  10 point ROS neg other than the symptoms noted above in the HPI.    Physical Exam   VS: LMP  (LMP Unknown)   Breastfeeding No     LABS:  TFTs:  1/2019:  TSH 2.4    Thyroid Ultrasound:  5/2019  FINDINGS:   The right thyroid lobe measures: 1.8 cmx1.5 cmx5.3 cm  The left thyroid lobe measures: 2.7 cmx2.4 cmx6.5 cm  The isthmus measures: 4 mm in AP diameter.  Thyroid parenchymal evaluation shows: Multiple bilateral thyroid nodules, both  probably solid and cystic, with remarkable nodules described below.  The largest nodule is in the left inferior thyroid measures 2.6 x 2.1 x 1.7 cm.  This predominantly solid (0) nodule is hyperechoic (0) and parallel (not taller  than wide) (0) with smooth margins (0) and nondescript echogenic foci (0). The  ultrasound score is 0. Based on the sonographic features, the nodule has low  suspicion for malignancy (1-10%).    Nodules in the right thyroid are subcentimeter. Numerous additional smaller  left-sided nodules.    Lymph nodes: No pathologically enlarged or suspicious lymph nodes are seen in  the vicinity of the thyroid.        1/2020:  FINDINGS:   The right thyroid lobe measures: 2.1 cmx1.6 cmx5.4 cm  The left thyroid lobe measures: 3.0 cmx2.6 cmx6.5 cm  The isthmus measures: 4 mm in AP diameter.  The thyroid parenchyma appears: Stable multiple thyroid nodules bilaterally.  Largest nodule is in the left inferior thyroid measures 2.8 cm and has the  following features:  *  composition: mixed cystic/solid (0)   *  echogenicity: isoechoic (0)   *  shape: taller than wide (1)   *  margins: smooth margins (0)   *  echogenic foci: no echogenic foci (0).   The [place] ultrasound score is 1. Based on the [employer] Thyroid Nodule Care  Process Model, the nodule has low suspicion for malignancy (1-5%).    Lymph nodes: No pathologically enlarged lymph nodes are seen in the neck, with  evaluation of levels II-V.  All pertinent notes, labs, and images personally reviewed by me.     A/P  Ms.Julie RITA Boudreaux is a 55 year old here for the evaluation of thyroid nodule:  #1 Thyroid Nodule:  Thyroid nodules are common and are frequently benign. Data suggest that the prevalence of palpable thyroid nodules is 3% to 7% in [place]; the prevalence is as high as 50% based on ultrasonography (US) or autopsy data. All patients with a palpable thyroid nodule, however, should undergo US examination. US-guided FNA (US-FNA) is recommended for nodules ?10 mm; US-FNA is suggested for nodules <10 mm only if clinical information or US features are suspicious.  The frequency of thyroid nodules in general population was discussed. Also discussed possibility of malignancy, potential for thyroid autonomy.   H/o lung cancer s/p radiation treatment.  No FH of thyroid cancer  No compressive s/s  Thyroid labs in acceptable range.  She is not on thyroid hormone replacement.  No other major risk factors.  US 5/2019 and 1/2020 compared.   Multiple small nodule in thyroid gland with largest nodule is in the left inferior thyroid measures 2.8 cm. Per report it appears  complex.  Plan:  Discussed diagnosis, pathophysiology, management and treatment options of condition with pt.  Based on US low suspicion for malignancy.  Pt reports significant wt loss in last 1 year but TSH 1/2019 in range. No other s/s of hyperactivity of thyroid.  Recommend repeat labs and US to assess interval change.  Follow up after above.    Causes of thyroid Nodules: Benign (Multinodular goiter, Hashimoto s thyroiditis, Simple or hemorrhagic cysts, Follicular adenomas, Subacute thyroiditis) or Malignant(Papillary carcinoma, Follicular carcinoma, Hürthle cell carcinoma, Medullary carcinoma, Anaplastic carcinoma, Primary thyroid lymphoma, or Metastatic malignant lesion).    MultiNodule:  The risk of cancer is not significantly higher in palpable solitary thyroid nodules than in multinodular lesions or in nodules in diffuse goiters. In multinodular thyroid glands, the cytologic sampling should be focused on lesions characterized by suspicious US features rather than on larger or clinically dominant nodules.    Cyst:  Most complex thyroid nodules with a dominant fluid component are benign. USFNA, however, should always be performed because the rare papillary thyroid carcinoma (PTC) can be cystic. An unsatisfactory (nondiagnostic) specimen usually results from a cystic nodule that yields few or no follicular cells. Reaspiration yields satisfactory results in 50% of cases.    All questions were answered.  The patient indicates understanding of the above issues and agrees with the plan set forth.    Follow-up:  After above.    Anna Watsno MD  Endocrinology   [facility]/[place]    Cc: Aggie Zavala    Addendum to above note and clinic visit:    Labs reviewed.    See result note/telephone encounter.          Again, thank you for allowing me to participate in the care of your patient.        Sincerely,        Anna Watson MD

## 2020-06-04 NOTE — PATIENT INSTRUCTIONS
[facility] & [place] locations   Dr Watson, Endocrinology Department      [facility]   [address]  [place] [address]  Appointment [phone]  Fax: [phone]  [place]: Monday and Tuesday         [facility]   [address]  [place] [address]  Appointment [phone]  Fax: [phone]  [place]: Wednesday and Thursday            Please check the cost coverage and copay with insurance before recommended tests, services and medications (especially if new medications are prescribed).     If ordered, please get blood work done 1 week prior to your next appointment so they will be available to Dr. Watson at your visit.    Labs needed.  Thyroid ultrasound with radiology.  Follow up after that.     [facility] radiology scheduleing   [place]  [phone]   [facility] scheduling  [place]  [phone]     Please call and schedule the recommended test as discussed in clinic visit. These are the numbers to call.

## 2020-06-04 NOTE — PROGRESS NOTES
"This is a telephone encounter.  Pt declined video visit    Start time: 9:18    End time: 9:58    More than 30 min spent reviewing chart, labs, results, imaging studies and in patient communication. Also spent time reviewing records from outside clinic.    In the setting of COVID-19 outbreak patients visits are transitioned to phone visits/ virtual visits as per system and leadership guidelines.  I have personally reviewed data including notes, lab tests. I have reviewed and interpreted images personally.  This encounter is potentially equivalent to NEW level 5  (09545) clinic visit.    The patient has been notified of following:      \"This telephone visit will be conducted via a call between you and your physician/provider. We have found that certain health care needs can be provided without the need for a physical exam.  This service lets us provide the care you need with a short phone conversation.  If a prescription is necessary we can send it directly to your pharmacy.  If lab work is needed we can place an order for that and you can then stop by our lab to have the test done at a later time.     We will bill your insurance company for this service.  Please check with your medical insurance if this type of visit is covered. You may be responsible for the cost of this type of visit if insurance coverage is denied.  The typical cost is $30 (10min), $59 (11-20min) and $85 (21-30min).  Most often these visits are shorter than 10 minutes. With new updates with corona virus patient might be billed as clinic visit.     If during the course of the call the physician/provider feels a telephone visit is not appropriate, you will not be charged for this service.\"      Past medical history, social history, family history, allergy and medications were reviewed and updated as appropriate.  Reviewed all pertinent labs, notes and imaging studies as appropriate.    Patient verbally consented to phone visit today: " yes.    Disclaimer: This note consists of symbols derived from keyboarding, dictation and/or voice recognition software. As a result, there may be errors in the script that have gone undetected. Please consider this when interpreting information found in this chart.    ROS neg expect noted in notes.  Patient feels well at this time and denies any tachycardia, palpitations, heat intolerance, tremor, insomnia, diarrhea, or unexplained weight loss.  Patient also denies  cold intolerance, constipation, or unexplained weight gain.   Name: Julia Boudreaux  Seen in consultation with gAgie Zavala for thyroid nodule.   HPI:  Julia Boudreaux is a 55 year old female who presents for the evaluation of thyroid nodule.   has a past medical history of 'light-for-dates' infant with signs of fetal malnutrition, Abdominal aortic aneurysm (H), Abnormal Papanicolaou smear of cervix and cervical HPV, Accident, Calculus of kidney, Displacement of lumbar intervertebral disc without myelopathy (3/29/08), Endometriosis, site unspecified, Lung cancer (H) (08/2019), Major depressive disorder, single episode, moderate (H), and Prolapse of vaginal vault after hysterectomy (2/23/13).  H/o Adenocarcinoma of rt lung.  PCP is through [facility].  Available records, labs and images from outside clinic were personally reviewed.    She was in ER in 12/2019 and had CT chest which showed incidental finding of calcified thyroid nodule and multinodular thyroid gland.  This was followed by Thyroid US 1/2020 at [facility].    On review of chart known prior h/o thyroid nodules.  No images to review.    5/2019 US:  Multiple bilateral low suspicion thyroid nodules, a few of which on  the left are greater than 1.5 cm in size. The largest nodule is in the left inferior thyroid measures 2.6 x 2.1 x 1.7 cm.  1/2020: Stable multiple thyroid nodules bilaterally. Largest nodule is in the left inferior thyroid measures 2.8 cm.    Never had FNA  done.    TFT 1/2019 in range.    + radiation - getting radiation treatment for lung cancer. Had 5 t/t in 2019.  No FH of thyroid cancer.  No compressive s/s.  Thyroid labs in acceptable range.  She is not on thyroid hormone replacement.  No other major risk factors.    + wt loss.  + wt loss about 50 lbs in 1 year per her report.  Appetite is OK.  Is active.    FH of thyroid problem:  History of thyroid dysfunction: NO  Palpitations:  No  Changes to hair or skin: + brittle hairs.  Diarrhea/Constipation:No  Changes in menses: s/p hysterectomy  Dysphagia or Shortness of breath:sometimes with soup- but no problems with coffee or water or other solid food.  Tremors:No  Changes in weight: Yes: + wt loss about 50 lbs in 1 year per her report.    PMH/PSH:  Past Medical History:   Diagnosis Date     'light-for-dates' infant with signs of fetal malnutrition      Abdominal aortic aneurysm (H)      Abnormal Papanicolaou smear of cervix and cervical HPV     Abn. 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    Hospitalized     Past Surgical History:   Procedure Laterality Date     BLADDER SURGERY  2010     C COMBINED ANT/POST COLPORRHAPHY  02/22/13     C LIGATE FALLOPIAN TUBE       CHOLECYSTECTOMY, LAPOROSCOPIC  08/27/2014     chondrectomy of the spine  03/01/2008    discectomy     HAND SURGERY  2016     IR CHEST TUBE PLACEMENT NON-TUNNELED RIGHT  8/12/2019     laminotomy for decompression and exploration  03/22/2016     rw back  11-1-12;11-15-12    2 nerves cut and cauterized in low back     ANDRES and BSO  2002    bleeding     Family Hx:  Family History   Problem Relation Age of Onset     Diabetes Father         ETOH abuse      Cardiovascular Maternal Grandmother         stroke     Alzheimer Disease No family hx of      Cancer No family hx of         breast, colon, uterine, ovarian     Heart Disease No family hx of      Thyroid Disease No family hx of      Anesthesia Reaction No family hx of           Social Hx:  Social History     Socioeconomic History     Marital status:      Spouse name: Octavio     Number of children: 4     Years of education: Not on file     Highest education level: Not on file   Occupational History     Employer: HOMEMAKER   Social Needs     Financial resource strain: Not on file     Food insecurity     Worry: Not on file     Inability: Not on file     Transportation needs     Medical: Not on file     Non-medical: Not on file   Tobacco Use     Smoking status: Current Every Day Smoker     Packs/day: 1.00     Years: 41.00     Pack years: 41.00     Smokeless tobacco: Never Used     Tobacco comment: declines quit line 2-12-13   Substance and Sexual Activity     Alcohol use: Yes     Comment: rare     Drug use: No     Sexual activity: Yes     Partners: Male     Birth control/protection: Surgical   Lifestyle     Physical activity     Days per week: Not on file     Minutes per session: Not on file     Stress: Not on file   Relationships     Social connections     Talks on phone: Not on file     Gets together: Not on file     Attends Buddhist service: Not on file     Active member of club or organization: Not on file     Attends meetings of clubs or organizations: Not on file     Relationship status: Not on file     Intimate partner violence     Fear of current or ex partner: Not on file     Emotionally abused: Not on file     Physically abused: Not on file     Forced sexual activity: Not on file   Other Topics Concern      Service Not Asked     Blood Transfusions No     Caffeine Concern Yes     Occupational Exposure Not Asked     Hobby Hazards Not Asked     Sleep Concern No     Stress Concern No     Weight  Concern Yes     Special Diet No     Back Care Yes     Exercise Yes     Bike Helmet Not Asked     Seat Belt Yes     Self-Exams Yes     Parent/sibling w/ CABG, MI or angioplasty before 65F 55M? Not Asked   Social History Narrative    Food manufacturing and dietary work, variety of manual labor, unloading           MEDICATIONS:  has a current medication list which includes the following prescription(s): aspirin-acetaminophen-caffeine, chlorzoxazone, diazepam, diclofenac, duloxetine, gabapentin, HEMP OIL OR EXTRACT OR OTHER CBD CANNABINOID, NOT MEDICAL CANNABIS,, meloxicam, ondansetron, order for dme, oxycodone-acetaminophen, and peg 3350.    Review of Systems  10 point ROS neg other than the symptoms noted above in the HPI.    Physical Exam   VS: LMP  (LMP Unknown)   Breastfeeding No     LABS:  TFTs:  1/2019:  TSH 2.4    Thyroid Ultrasound:  5/2019  FINDINGS:   The right thyroid lobe measures: 1.8 cmx1.5 cmx5.3 cm  The left thyroid lobe measures: 2.7 cmx2.4 cmx6.5 cm  The isthmus measures: 4 mm in AP diameter.  Thyroid parenchymal evaluation shows: Multiple bilateral thyroid nodules, both  probably solid and cystic, with remarkable nodules described below.  The largest nodule is in the left inferior thyroid measures 2.6 x 2.1 x 1.7 cm.  This predominantly solid (0) nodule is hyperechoic (0) and parallel (not taller  than wide) (0) with smooth margins (0) and nondescript echogenic foci (0). The  ultrasound score is 0. Based on the sonographic features, the nodule has low  suspicion for malignancy (1-10%).    Nodules in the right thyroid are subcentimeter. Numerous additional smaller  left-sided nodules.    Lymph nodes: No pathologically enlarged or suspicious lymph nodes are seen in  the vicinity of the thyroid.       1/2020:  FINDINGS:   The right thyroid lobe measures: 2.1 cmx1.6 cmx5.4 cm  The left thyroid lobe measures: 3.0 cmx2.6 cmx6.5 cm  The isthmus measures: 4 mm in AP diameter.  The thyroid parenchyma  appears: Stable multiple thyroid nodules bilaterally.  Largest nodule is in the left inferior thyroid measures 2.8 cm and has the  following features:  *  composition: mixed cystic/solid (0)   *  echogenicity: isoechoic (0)   *  shape: taller than wide (1)   *  margins: smooth margins (0)   *  echogenic foci: no echogenic foci (0).   The [place] ultrasound score is 1. Based on the [employer] Thyroid Nodule Care  Process Model, the nodule has low suspicion for malignancy (1-5%).    Lymph nodes: No pathologically enlarged lymph nodes are seen in the neck, with  evaluation of levels II-V.  All pertinent notes, labs, and images personally reviewed by me.     A/P  Ms.Julie RITA Boudreaux is a 55 year old here for the evaluation of thyroid nodule:  #1 Thyroid Nodule:  Thyroid nodules are common and are frequently benign. Data suggest that the prevalence of palpable thyroid nodules is 3% to 7% in [place]; the prevalence is as high as 50% based on ultrasonography (US) or autopsy data. All patients with a palpable thyroid nodule, however, should undergo US examination. US-guided FNA (US-FNA) is recommended for nodules ?10 mm; US-FNA is suggested for nodules <10 mm only if clinical information or US features are suspicious.  The frequency of thyroid nodules in general population was discussed. Also discussed possibility of malignancy, potential for thyroid autonomy.   H/o lung cancer s/p radiation treatment.  No FH of thyroid cancer  No compressive s/s  Thyroid labs in acceptable range.  She is not on thyroid hormone replacement.  No other major risk factors.  US 5/2019 and 1/2020 compared.   Multiple small nodule in thyroid gland with largest nodule is in the left inferior thyroid measures 2.8 cm. Per report it appears complex.  Plan:  Discussed diagnosis, pathophysiology, management and treatment options of condition with pt.  Based on US low suspicion for malignancy.  Pt reports significant wt loss in last 1 year but TSH  1/2019 in range. No other s/s of hyperactivity of thyroid.  Recommend repeat labs and US to assess interval change.  Follow up after above.    Causes of thyroid Nodules: Benign (Multinodular goiter, Hashimoto s thyroiditis, Simple or hemorrhagic cysts, Follicular adenomas, Subacute thyroiditis) or Malignant(Papillary carcinoma, Follicular carcinoma, Hürthle cell carcinoma, Medullary carcinoma, Anaplastic carcinoma, Primary thyroid lymphoma, or Metastatic malignant lesion).    MultiNodule:  The risk of cancer is not significantly higher in palpable solitary thyroid nodules than in multinodular lesions or in nodules in diffuse goiters. In multinodular thyroid glands, the cytologic sampling should be focused on lesions characterized by suspicious US features rather than on larger or clinically dominant nodules.    Cyst:  Most complex thyroid nodules with a dominant fluid component are benign. USFNA, however, should always be performed because the rare papillary thyroid carcinoma (PTC) can be cystic. An unsatisfactory (nondiagnostic) specimen usually results from a cystic nodule that yields few or no follicular cells. Reaspiration yields satisfactory results in 50% of cases.    All questions were answered.  The patient indicates understanding of the above issues and agrees with the plan set forth.    Follow-up:  After above.    Anna Watson MD  Endocrinology   [facility]/[place]    Cc: Aggie Zavala    Addendum to above note and clinic visit:    Labs reviewed.    See result note/telephone encounter.

## 2020-10-29 ENCOUNTER — TELEPHONE (OUTPATIENT)
Dept: ENDOCRINOLOGY | Facility: CLINIC | Age: 55
End: 2020-10-29

## 2020-10-29 NOTE — TELEPHONE ENCOUNTER
Patient had [facility] send US thyroid reports to us in July and is wanting to know if Dr Watson would like her to start medication. She had a video visit with Dr Watson in June and this was discussed. Please send medication to [facility] in [place], phone # [phone]. Call patient to advise at [phone] (ok to leave detailed message)

## 2020-11-02 NOTE — TELEPHONE ENCOUNTER
US doen at [facility] 1/2020:  I do not see 7/2020 US under care everywhere.    US 1/2020:  FINDINGS:     The right thyroid lobe measures: 2.1 cmx1.6 cmx5.4 cm  The left thyroid lobe measures: 3.0 cmx2.6 cmx6.5 cm  The isthmus measures: 4 mm in AP diameter.    The thyroid parenchyma appears: Stable multiple thyroid nodules bilaterally.    Largest nodule is in the left inferior thyroid measures 2.8 cm and has the  following features:  *  composition: mixed cystic/solid (0)   *  echogenicity: isoechoic (0)   *  shape: taller than wide (1)   *  margins: smooth margins (0)   *  echogenic foci: no echogenic foci (0).   The [place] ultrasound score is 1. Based on the [employer]pert Thyroid Nodule Care  Process Model, the nodule has low suspicion for malignancy (1-5%).    Lymph nodes: No pathologically enlarged lymph nodes are seen in the neck, with  evaluation of levels II-V.  FINDINGS:     The right thyroid lobe measures: 2.1 cmx1.6 cmx5.4 cm  The left thyroid lobe measures: 3.0 cmx2.6 cmx6.5 cm  The isthmus measures: 4 mm in AP diameter.    The thyroid parenchyma appears: Stable multiple thyroid nodules bilaterally.    Largest nodule is in the left inferior thyroid measures 2.8 cm and has the  following features:  *  composition: mixed cystic/solid (0)   *  echogenicity: isoechoic (0)   *  shape: taller than wide (1)   *  margins: smooth margins (0)   *  echogenic foci: no echogenic foci (0).   The [place] ultrasound score is 1. Based on the [employer]pert Thyroid Nodule Care  Process Model, the nodule has low suspicion for malignancy (1-5%).    Lymph nodes: No pathologically enlarged lymph nodes are seen in the neck, with  evaluation of levels II-V.    Labs 1/2020: TSH 2.4    Plan was to repeat labs and US to assess interval change.    I do not see any recent labs or recent ultrasound.  Endocrine staff-can you please check with patient if we have recent labs or a recent ultrasound?    Thyroid hormone replacement is not  indicated as a management of thyroid nodules.  Thyroid hormone replacement can be considered based on hormone levels.  Labs and ultrasound orders are in chart.    Please inform patient.

## 2020-11-02 NOTE — TELEPHONE ENCOUNTER
Patient said she will think about it and get back to us.    Sandi Valdez, AMI  [facility]  [place]

## 2020-11-30 DIAGNOSIS — C34.11 MALIGNANT NEOPLASM OF RIGHT UPPER LOBE OF LUNG (H): Primary | ICD-10-CM

## 2021-01-14 ENCOUNTER — HEALTH MAINTENANCE LETTER (OUTPATIENT)
Age: 56
End: 2021-01-14

## 2021-01-19 ENCOUNTER — HOSPITAL ENCOUNTER (OUTPATIENT)
Dept: CT IMAGING | Facility: CLINIC | Age: 56
Discharge: HOME | End: 2021-01-19
Attending: RADIOLOGY | Admitting: RADIOLOGY
Payer: COMMERCIAL

## 2021-01-19 DIAGNOSIS — C34.11 MALIGNANT NEOPLASM OF RIGHT UPPER LOBE OF LUNG (H): ICD-10-CM

## 2021-01-19 PROCEDURE — 71250 CT THORAX DX C-: CPT | Mod: 26 | Performed by: RADIOLOGY

## 2021-01-19 PROCEDURE — 71250 CT THORAX DX C-: CPT

## 2021-01-20 ENCOUNTER — VIRTUAL VISIT (OUTPATIENT)
Dept: RADIATION ONCOLOGY | Facility: CLINIC | Age: 56
End: 2021-01-20
Attending: RADIOLOGY
Payer: COMMERCIAL

## 2021-01-20 DIAGNOSIS — C34.11 MALIGNANT NEOPLASM OF RIGHT UPPER LOBE OF LUNG (H): Primary | ICD-10-CM

## 2021-01-20 NOTE — PROGRESS NOTES
[facility], [place]  Radiation Oncology Follow-up Note  2021    Julia Boudreaux   MRN: [id number]  : 1965     DISEASE TREATED:  Adenocarcinoma of the RUL  INTERVAL SINCE COMPLETION OF RADIATION THERAPY: 15 months; completed treatment on 10/04/2019     TYPE OF RADIATION THERAPY ADMINISTERED: 5000 cGy in 5 fractions, Rx to 86%IDL.          SUBJECTIVE: Ms. Boudreaux is a 54 year old woman, a current smoker, who initially sought medical attention for evaluation of unexplained weight loss (30 Ibs over the past year) in May 2019.      She was evaluated by CT chest abdomen pelvis, at [facility], [place], [place], on  which demonstrated an incidental 0.9 x 2.2 x 12 cm right upper lobe nodule as well as mildly prominent right hilar lymph node measuring 0.9 x 1.1 cm. The patient was then referred to Pulmonary Medicine at [facility] for further evaluation. She underwent a staging PET/CT on 2019 which redemonstrated a 2.3x 0.6 cm hypermetabolic pleural based right upper lobe nodule (SUV max 9.8) as well as additional non-FDG avid left apical nodule. There was no evidence of hypermetabolic mediastinal adenopathy or metastatic disease.      The patient then was seen by Dr. Johnston on 19. The recommendation was to proceed with surgical resection. She had a pulmonary function test which showed FEV1 90% (2.43L) and DLCO 82% (17.61 ml/min/mmHg). She then underwent a CT guided biopsy from the RUL lesion on 19. The pathology ([id number]) showed invasive adenocarcinoma, acinar predominant. The tumor cells were positive for TTF-1, but negative for Napsin A, cytokeratin CK-5/6, and p63, supporting the diagnosis. PD-L1 testing was highly positive (80%). The molecular genetic testing ([id number]) revealed no mutation in the BRAF, EGFR, ERBB2, KRAS, MET, NRAS, RET genes. Her procedure course was complicated by pneumothorax for which she was admitted from  through 8/15/19. She  underwent placement of chest tube and discharged in a stable condition. The patient was reluctant to pursue surgery with her many co-morbidities and ambulation issues.     Ms. Boudreaux is seen as a phone visit today. Since she was last seen, she did see Dr. JAMAICA Watson of Endocrinology for the thyroid nodule who recommended a repeat ultrasound and repeat labs. Ms. Boudreaux has not been able to have this done as of yet as she has been caring for her mother who suffered a fall last year.     She does state that she is struggling to maintain her weight; when last seen at the completion of SBRT she weighed 104 pounds, and she states that she is weighing about 98 pounds now. She also endorsed drenching night sweats that has been going on for this past year. She states this is so bad that she changes her pajamas two to three times nightly.      Towards her previously treated lung, she denies new cough, shortness of breath, or fatigue. She does endorse some intermittent chest pain on her right chest wall where she was treated but it is intermittent and fleeting. She does continue to smoke.                                     OBJECTIVE:     PHYSICAL EXAM:  *phone visit    IMPRESSION: No clinical evidence for recurrent disease at this time. She has tolerated SBRT without issue. 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     RECOMMENDATIONS:    F/u in 3 months with repeat CT Chest.    I communicated with the patient that she does need to follow up with her primary care doctor and endocrinologist regarding her thyroid nodule, and importantly, regarding workup of these ongoing drenching night sweats with weight loss. The patient was in understanding and amenable    The patient was seen and assessed with staff, Dr. Nj, who agrees with the above assessment and plan.      Cristal Mendoza MD PGY2  Department of Radiation Oncology  [phone]    25 minutes spent on the date of the  encounter doing chart review, review of test results, interpretation of tests, patient visit and documentation     I talked to the patient with the resident.  I agree with the resident's note and plan of care.      REYMUNDO Nj M.D.  Department of Radiation Oncology  [facility]

## 2021-01-20 NOTE — LETTER
2021         RE: Julia Boudreaux  [address] [facility] [address]        Dear Colleague,    Thank you for referring your patient, Julia Boudreaux, to the [facility] RADIATION ONCOLOGY. Please see a copy of my visit note below.    [facility]  Radiation Oncology Follow-up Note  2021    Julia Boudreaux   MRN: [id number]  : 1965     DISEASE TREATED:  Adenocarcinoma of the RUL  INTERVAL SINCE COMPLETION OF RADIATION THERAPY: 15 months; completed treatment on 10/04/2019     TYPE OF RADIATION THERAPY ADMINISTERED: 5000 cGy in 5 fractions, Rx to 86%IDL.          SUBJECTIVE: Ms. Boudreaux is a 54 year old woman, a current smoker, who initially sought medical attention for evaluation of unexplained weight loss (30 Ibs over the past year) in May 2019.      She was evaluated by CT chest abdomen pelvis, at [facility], [place], [place], on  which demonstrated an incidental 0.9 x 2.2 x 12 cm right upper lobe nodule as well as mildly prominent right hilar lymph node measuring 0.9 x 1.1 cm. The patient was then referred to Pulmonary Medicine at [facility] for further evaluation. She underwent a staging PET/CT on 2019 which redemonstrated a 2.3x 0.6 cm hypermetabolic pleural based right upper lobe nodule (SUV max 9.8) as well as additional non-FDG avid left apical nodule. There was no evidence of hypermetabolic mediastinal adenopathy or metastatic disease.      The patient then was seen by Dr. Johnston on 19. The recommendation was to proceed with surgical resection. She had a pulmonary function test which showed FEV1 90% (2.43L) and DLCO 82% (17.61 ml/min/mmHg). She then underwent a CT guided biopsy from the RUL lesion on 19. The pathology ([id number]) showed invasive adenocarcinoma, acinar predominant. The tumor cells were positive for TTF-1, but negative for Napsin A, cytokeratin CK-5/6, and p63, supporting the diagnosis. PD-L1 testing  was highly positive (80%). The molecular genetic testing ([id number]) revealed no mutation in the BRAF, EGFR, ERBB2, KRAS, MET, NRAS, RET genes. Her procedure course was complicated by pneumothorax for which she was admitted from 8/12 through 8/15/19. She underwent placement of chest tube and discharged in a stable condition. The patient was reluctant to pursue surgery with her many co-morbidities and ambulation issues.     Ms. Boudreaux is seen as a phone visit today. Since she was last seen, she did see Dr. JAMAICA Watson of Endocrinology for the thyroid nodule who recommended a repeat ultrasound and repeat labs. Ms. Boudreaux has not been able to have this done as of yet as she has been caring for her mother who suffered a fall last year.     She does state that she is struggling to maintain her weight; when last seen at the completion of SBRT she weighed 104 pounds, and she states that she is weighing about 98 pounds now. She also endorsed drenching night sweats that has been going on for this past year. She states this is so bad that she changes her pajamas two to three times nightly.      Towards her previously treated lung, she denies new cough, shortness of breath, or fatigue. She does endorse some intermittent chest pain on her right chest wall where she was treated but it is intermittent and fleeting. She does continue to smoke.                                     OBJECTIVE:     PHYSICAL EXAM:  *phone visit    IMPRESSION: No clinical evidence for recurrent disease at this time. She has tolerated SBRT without issue. 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     RECOMMENDATIONS:    F/u in 3 months with repeat CT Chest.    I communicated with the patient that she does need to follow up with her primary care doctor and endocrinologist regarding her thyroid nodule, and importantly, regarding workup of these ongoing drenching night sweats with weight loss. The patient was  in understanding and amenable    The patient was seen and assessed with staff, Dr. Nj, who agrees with the above assessment and plan.      Cristal Mendoza MD PGY2  Department of Radiation Oncology  [phone]    25 minutes spent on the date of the encounter doing chart review, review of test results, interpretation of tests, patient visit and documentation     I talked to the patient with the resident.  I agree with the resident's note and plan of care.      REYMUNDO Nj M.D.  Department of Radiation Oncology  [facility]        FOLLOW-UP VISIT    Patient Name: Julia Boudreaux      : 1965     Age: 55 year old        ______________________________________________________________________________     Chief Complaint   Patient presents with     Cancer     15 moonth fup for rad therapy to RUL  SBRT     LMP  (LMP Unknown)      Date Radiation Completed: 10/14/2019    Pain  Denies    Labs  Other Labs: No    Imaging  CT: chest  yesterday          Other Appointments:     MD Name:  Appointment Date:    MD Name: Appointment Date:   MD Name: Appointment Date:   Other Appointment Notes:     Residual Radiation side effect: none     Additional Instructions:     Nurse face-to-face time: Level 3:  10 min face to face time        Again, thank you for allowing me to participate in the care of your patient.        Sincerely,        Katey Nj MD

## 2021-01-20 NOTE — PROGRESS NOTES
FOLLOW-UP VISIT    Patient Name: Julia Boudreaux      : 1965     Age: 55 year old        ______________________________________________________________________________     Chief Complaint   Patient presents with     Cancer     15 moonth fup for rad therapy to RUL  SBRT     LMP  (LMP Unknown)      Date Radiation Completed: 10/14/2019    Pain  Denies    Labs  Other Labs: No    Imaging  CT: chest  yesterday          Other Appointments:     MD Name:  Appointment Date:    MD Name: Appointment Date:   MD Name: Appointment Date:   Other Appointment Notes:     Residual Radiation side effect: none     Additional Instructions:     Nurse face-to-face time: Level 3:  10 min face to face time

## 2021-01-28 DIAGNOSIS — M54.16 LUMBAR RADICULOPATHY: Primary | ICD-10-CM

## 2021-02-01 ENCOUNTER — ANCILLARY PROCEDURE (OUTPATIENT)
Dept: GENERAL RADIOLOGY | Facility: CLINIC | Age: 56
End: 2021-02-01
Attending: ORTHOPAEDIC SURGERY
Payer: COMMERCIAL

## 2021-02-01 ENCOUNTER — OFFICE VISIT (OUTPATIENT)
Dept: ORTHOPEDICS | Facility: CLINIC | Age: 56
End: 2021-02-01
Payer: COMMERCIAL

## 2021-02-01 VITALS — BODY MASS INDEX: 18.33 KG/M2 | HEIGHT: 65 IN | WEIGHT: 110 LBS

## 2021-02-01 DIAGNOSIS — M54.16 LUMBAR RADICULOPATHY: Primary | ICD-10-CM

## 2021-02-01 PROCEDURE — 99213 OFFICE O/P EST LOW 20 MIN: CPT | Performed by: ORTHOPAEDIC SURGERY

## 2021-02-01 PROCEDURE — 72110 X-RAY EXAM L-2 SPINE 4/>VWS: CPT | Mod: GC | Performed by: RADIOLOGY

## 2021-02-01 RX ORDER — OXYCODONE HYDROCHLORIDE 5 MG/1
5 TABLET ORAL
COMMUNITY
Start: 2021-01-16

## 2021-02-01 ASSESSMENT — MIFFLIN-ST. JEOR: SCORE: 1076.9

## 2021-02-01 NOTE — LETTER
2/1/2021       RE: Julia Boudreaux  [address] [facility] [address]    Dear Colleague,    Thank you for referring your patient, Julia Boudreaux, to the [facility] [place]. Please see a copy of my visit note below.    Spine Surgery Return Clinic Visit      Chief Complaint:   RECHECK (patient having severe low back pain last seen 10/18/19)      Interval HPI:  Symptom Profile Including: location of symptoms, onset, severity, exacerbating/alleviating factors, previous treatments:        Julia Boudreaux is a 55 year old female who returns today for further discussion of low back pain.  Her pain is predominantly across the low back.  She has had 2 previous discectomies.  It radiates also into the right greater than left leg.  She says the right leg symptoms never really got better after her first discectomy surgery so some of this is residual.  The majority of her problem now is back pain.  It is worse with standing and walking and improved somewhat with rest.  She says she did do physical therapy recently.  No recent injections.  She was recently diagnosed with Malignant neoplasm of the lung and she is an active smoker.  She has been undergoing radiation therapy.          Past Medical History:     Past Medical History:   Diagnosis Date     'light-for-dates' infant with signs of fetal malnutrition      Abdominal aortic aneurysm (H)      Abnormal Papanicolaou smear of cervix and cervical HPV     Abn. 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    Hospitalized            Past Surgical History:     Past Surgical History:   Procedure Laterality Date     BLADDER SURGERY  2010     C COMBINED  ANT/POST COLPORRHAPHY  02/22/13     C LIGATE FALLOPIAN TUBE       CHOLECYSTECTOMY, LAPOROSCOPIC  08/27/2014     chondrectomy of the spine  03/01/2008    discectomy     HAND SURGERY  2016     IR CHEST TUBE PLACEMENT NON-TUNNELED RIGHT  8/12/2019     laminotomy for decompression and exploration  03/22/2016     rw back  11-1-12;11-15-12    2 nerves cut and cauterized in low back     ANDRES and BSO  2002    bleeding            Social History:     Social History     Tobacco Use     Smoking status: Current Every Day Smoker     Packs/day: 1.00     Years: 41.00     Pack years: 41.00     Smokeless tobacco: Never Used     Tobacco comment: declines quit line 2-12-13   Substance Use Topics     Alcohol use: Yes     Comment: rare            Family History:     Family History   Problem Relation Age of Onset     Diabetes Father         ETOH abuse     Cardiovascular Maternal Grandmother         stroke     Alzheimer Disease No family hx of      Cancer No family hx of         breast, colon, uterine, ovarian     Heart Disease No family hx of      Thyroid Disease No family hx of      Anesthesia Reaction No family hx of             Allergies:     Allergies   Allergen Reactions     Latex Rash     Liquid Adhesive Rash            Medications:     Current Outpatient Medications   Medication     aspirin-acetaminophen-caffeine (EXCEDRIN MIGRAINE) 250-250-65 MG tablet     chlorzoxazone (PARAFON FORTE) 500 MG tablet     diazepam (VALIUM) 5 MG tablet     diclofenac (VOLTAREN) 1 % topical gel     DULoxetine (CYMBALTA) 60 MG capsule     gabapentin (NEURONTIN) 600 MG tablet     HEMP OIL OR EXTRACT OR OTHER CBD CANNABINOID, NOT MEDICAL CANNABIS,     meloxicam (MOBIC) 7.5 MG tablet     ondansetron (ZOFRAN ODT) 4 MG ODT tab     order for DME     oxyCODONE-acetaminophen (PERCOCET) 5-325 MG tablet     No current facility-administered medications for this visit.              Review of Systems:   A focused musculoskeletal and neurologic ROS was performed with  pertinent positives and negatives noted in the HPI.  Additional systems were also reviewed and are documented at the bottom of the note.         Physical Exam:   Vitals: LMP  (LMP Unknown)   Musculoskeletal, Neurologic, and Spine:      Lumbar Spine:    Appearance - No gross stepoffs or deformities    Motor -     L2-3: Hip flexion 5/5 R and 5/5 L strength          L3/4:  Knee extension R 5/5 and L 5/5 strength         L4/5:  Foot dorsiflexion R 5/5 L 5/5 and       EHL dorsiflexion R 4/5 L 4/5 strength         S1:  Plantarflexion/Peroneal Muscles  R 5/5 and L 5/5 strength    Sensation: intact to light touch L3-S1 distribution BLE      Neurologic:      REFLEXES Left Right                  Patella 1+ 1+   Ankle jerk 1+ 1+   Clonus 0 beats 0 beats            Imaging:   We ordered and independently reviewed new radiographs at this clinic visit. The results were discussed with the patient. Findings include:     Lumbar radiographs today show severe spondylosis at L5-S1 but otherwise preserved alignment     Assessment and Plan:     55 year old female with severe L5-S1 spondylosis.    I do think the L5-S1 degenerative changes could be a significant contributor to her back pain.  However with her recent cancer diagnosis and active smoking I am not certain she is a good surgical candidate at this time.  I would like her to get a little bit farther out from her oncologic treatments.  Also when she has quit smoking she could contact us and I would have her come in for a urine nicotine test.  Once we have a negative nicotine test we could move closer to scheduling an operation if indeed she wanted to proceed with this.  I explained that if she did need surgery would likely be a lumbar fusion.  We went over some of the risks and benefits of this and she is going to think about it and she will let us know if she is able to quit smoking.     Respectfully,  Tejinder Gillespie MD  Spine Surgery  [facility]

## 2021-02-01 NOTE — PROGRESS NOTES
Spine Surgery Return Clinic Visit      Chief Complaint:   RECHECK (patient having severe low back pain last seen 10/18/19)      Interval HPI:  Symptom Profile Including: location of symptoms, onset, severity, exacerbating/alleviating factors, previous treatments:        Julia Boudreaux is a 55 year old female who returns today for further discussion of low back pain.  Her pain is predominantly across the low back.  She has had 2 previous discectomies.  It radiates also into the right greater than left leg.  She says the right leg symptoms never really got better after her first discectomy surgery so some of this is residual.  The majority of her problem now is back pain.  It is worse with standing and walking and improved somewhat with rest.  She says she did do physical therapy recently.  No recent injections.  She was recently diagnosed with Malignant neoplasm of the lung and she is an active smoker.  She has been undergoing radiation therapy.            Past Medical History:     Past Medical History:   Diagnosis Date     'light-for-dates' infant with signs of fetal malnutrition      Abdominal aortic aneurysm (H)      Abnormal Papanicolaou smear of cervix and cervical HPV     Abn. 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    Hospitalized            Past Surgical History:     Past Surgical History:   Procedure Laterality Date     BLADDER SURGERY  2010     C COMBINED ANT/POST COLPORRHAPHY  02/22/13     C LIGATE FALLOPIAN TUBE       CHOLECYSTECTOMY, LAPOROSCOPIC  08/27/2014     chondrectomy of the spine  03/01/2008    discectomy     HAND SURGERY  2016     IR CHEST TUBE PLACEMENT NON-TUNNELED RIGHT  8/12/2019      laminotomy for decompression and exploration  03/22/2016     rw back  11-1-12;11-15-12    2 nerves cut and cauterized in low back     ANDRES and BSO  2002    bleeding            Social History:     Social History     Tobacco Use     Smoking status: Current Every Day Smoker     Packs/day: 1.00     Years: 41.00     Pack years: 41.00     Smokeless tobacco: Never Used     Tobacco comment: declines quit line 2-12-13   Substance Use Topics     Alcohol use: Yes     Comment: rare            Family History:     Family History   Problem Relation Age of Onset     Diabetes Father         ETOH abuse     Cardiovascular Maternal Grandmother         stroke     Alzheimer Disease No family hx of      Cancer No family hx of         breast, colon, uterine, ovarian     Heart Disease No family hx of      Thyroid Disease No family hx of      Anesthesia Reaction No family hx of             Allergies:     Allergies   Allergen Reactions     Latex Rash     Liquid Adhesive Rash            Medications:     Current Outpatient Medications   Medication     aspirin-acetaminophen-caffeine (EXCEDRIN MIGRAINE) 250-250-65 MG tablet     chlorzoxazone (PARAFON FORTE) 500 MG tablet     diazepam (VALIUM) 5 MG tablet     diclofenac (VOLTAREN) 1 % topical gel     DULoxetine (CYMBALTA) 60 MG capsule     gabapentin (NEURONTIN) 600 MG tablet     HEMP OIL OR EXTRACT OR OTHER CBD CANNABINOID, NOT MEDICAL CANNABIS,     meloxicam (MOBIC) 7.5 MG tablet     ondansetron (ZOFRAN ODT) 4 MG ODT tab     order for DME     oxyCODONE-acetaminophen (PERCOCET) 5-325 MG tablet     No current facility-administered medications for this visit.              Review of Systems:   A focused musculoskeletal and neurologic ROS was performed with pertinent positives and negatives noted in the HPI.  Additional systems were also reviewed and are documented at the bottom of the note.         Physical Exam:   Vitals: LMP  (LMP Unknown)   Musculoskeletal, Neurologic, and Spine:      Lumbar  Spine:    Appearance - No gross stepoffs or deformities    Motor -     L2-3: Hip flexion 5/5 R and 5/5 L strength          L3/4:  Knee extension R 5/5 and L 5/5 strength         L4/5:  Foot dorsiflexion R 5/5 L 5/5 and       EHL dorsiflexion R 4/5 L 4/5 strength         S1:  Plantarflexion/Peroneal Muscles  R 5/5 and L 5/5 strength    Sensation: intact to light touch L3-S1 distribution BLE      Neurologic:      REFLEXES Left Right                  Patella 1+ 1+   Ankle jerk 1+ 1+   Clonus 0 beats 0 beats            Imaging:   We ordered and independently reviewed new radiographs at this clinic visit. The results were discussed with the patient. Findings include:     Lumbar radiographs today show severe spondylosis at L5-S1 but otherwise preserved alignment       Assessment and Plan:     55 year old female with severe L5-S1 spondylosis.    I do think the L5-S1 degenerative changes could be a significant contributor to her back pain.  However with her recent cancer diagnosis and active smoking I am not certain she is a good surgical candidate at this time.  I would like her to get a little bit farther out from her oncologic treatments.  Also when she has quit smoking she could contact us and I would have her come in for a urine nicotine test.  Once we have a negative nicotine test we could move closer to scheduling an operation if indeed she wanted to proceed with this.  I explained that if she did need surgery would likely be a lumbar fusion.  We went over some of the risks and benefits of this and she is going to think about it and she will let us know if she is able to quit smoking.           Respectfully,  Tejinder Gillespie MD  Spine Surgery  [facility]

## 2021-04-02 DIAGNOSIS — C34.11 MALIGNANT NEOPLASM OF RIGHT UPPER LOBE OF LUNG (H): Primary | ICD-10-CM

## 2021-04-29 ENCOUNTER — HOSPITAL ENCOUNTER (OUTPATIENT)
Dept: PET IMAGING | Facility: CLINIC | Age: 56
Discharge: HOME | End: 2021-04-29
Attending: RADIOLOGY | Admitting: RADIOLOGY
Payer: COMMERCIAL

## 2021-04-29 DIAGNOSIS — C34.11 MALIGNANT NEOPLASM OF RIGHT UPPER LOBE OF LUNG (H): ICD-10-CM

## 2021-04-29 PROCEDURE — 343N000001 HC RX 343: Performed by: RADIOLOGY

## 2021-04-29 PROCEDURE — 78815 PET IMAGE W/CT SKULL-THIGH: CPT | Mod: PS

## 2021-04-29 PROCEDURE — 78815 PET IMAGE W/CT SKULL-THIGH: CPT | Mod: 26 | Performed by: RADIOLOGY

## 2021-04-29 PROCEDURE — A9552 F18 FDG: HCPCS | Performed by: RADIOLOGY

## 2021-04-29 RX ADMIN — FLUDEOXYGLUCOSE F-18 10.18 MCI.: 500 INJECTION, SOLUTION INTRAVENOUS at 08:13

## 2021-05-05 ENCOUNTER — VIRTUAL VISIT (OUTPATIENT)
Dept: RADIATION ONCOLOGY | Facility: CLINIC | Age: 56
End: 2021-05-05
Attending: RADIOLOGY
Payer: COMMERCIAL

## 2021-05-05 DIAGNOSIS — C34.11 MALIGNANT NEOPLASM OF RIGHT UPPER LOBE OF LUNG (H): Primary | ICD-10-CM

## 2021-05-05 PROCEDURE — G0463 HOSPITAL OUTPT CLINIC VISIT: HCPCS | Mod: TEL | Performed by: RADIOLOGY

## 2021-05-05 PROCEDURE — 999N001193 HC VIDEO/TELEPHONE VISIT; NO CHARGE: Performed by: RADIOLOGY

## 2021-05-05 NOTE — PROGRESS NOTES
FOLLOW-UP VISIT    Patient Name: Julia Boudreaux      : 1965     Age: 56 year old        ______________________________________________________________________________     Chief Complaint   Patient presents with     Cancer     18 month fup for rad therapy to chest     LMP  (LMP Unknown)      Date Radiation Completed: 10/14/2019    Pain  Denies    Labs  Other Labs: No    Imaging  CT: PET 2021          Other Appointments:     MD Name:  Appointment Date:    MD Name: Appointment Date:   MD Name: Appointment Date:   Other Appointment Notes:     Residual Radiation side effect: none     Additional Instructions:     Nurse face-to-face time: Level 3:  10 min face to face time

## 2021-05-05 NOTE — PROGRESS NOTES
[facility] [place]  Radiation Oncology Follow-up Note  May 5, 2021    Julia Boudreaux   MRN: [id number]  : 1965     DISEASE TREATED:  Adenocarcinoma of the RUL    INTERVAL SINCE COMPLETION OF RADIATION THERAPY: 19 months; completed treatment on 10/04/2019     TYPE OF RADIATION THERAPY ADMINISTERED: 5000 cGy in 5 fractions, Rx to 86%IDL.        SUBJECTIVE: Ms. Boudreaux is a 56 year old woman, current smoker, with a history of early stage NSCLC of the RUL. She initially sought medical attention for evaluation of unexplained weight loss (30 Ibs over the past year) in May 2019. She was evaluated by CT chest abdomen pelvis, at [facility], [place], [place], on  which demonstrated an incidental 0.9 x 2.2 x 12 cm right upper lobe nodule; as well as mildly prominent right hilar lymph node measuring 0.9 x 1.1 cm. The patient was referred to Pulmonary Medicine at [facility] for further evaluation. She underwent a staging PET/CT on 2019 which redemonstrated a 2.3x 0.6 cm hypermetabolic pleural based right upper lobe nodule (SUV max 9.8) as well as additional non-FDG avid left apical nodule. There was no evidence of hypermetabolic mediastinal adenopathy or metastatic disease.      The patient then was seen by Dr. Johnston on 19. The recommendation was to proceed with surgical resection. She had a pulmonary function test which showed FEV1 90% (2.43L) and DLCO 82% (17.61 ml/min/mmHg). She then underwent a CT guided biopsy from the RUL lesion on 19. The pathology ([id number]) showed invasive adenocarcinoma, acinar predominant. The tumor cells were positive for TTF-1, but negative for Napsin A, cytokeratin CK-5/6, and p63, supporting the diagnosis. PD-L1 testing was highly positive (80%). The molecular genetic testing ([id number]) revealed no mutation in the BRAF, EGFR, ERBB2, KRAS, MET, NRAS, RET genes. Her procedure course was complicated by pneumothorax for which she was admitted  from 8/12 through 8/15/19. She underwent placement of chest tube and discharged in a stable condition. The patient was reluctant to pursue surgery with her many co-morbidities and ambulation issues. She was thus treated with SBRT, as described above. We last saw the patient 4 months ago, when surveillance chest CT with without evidence of recurrent disease. She had complained of continuing night sweats at that visit.    Since we last saw the patient has continued to work with her PCP to further workup her constitutional symptoms. A CT-AP from 2/18/2021 revealed moderate colonic fecal retension but was without evidence of infection or neoplasia. 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 She is being managed with steroid injections. She presents for telephone follow-up today, having completed a surveillance PET/CT last week.    Today Ms. Boudreaux states that she is generally well. She continues to smoke daily and has no interest in quitting. She complains of ongoing right shoulder pain, but does not have any upper of mid back pain. She has gained some weight over the past few months, and denies fatigue, cough, SOB, headaches or hemoptysis.                                    OBJECTIVE: A/Ox3. NAD. Pleasant sounding on the phone      PET/CT: 4/29/2021                  IMPRESSION: In this patient with adenocarcinoma of the right upper  lobe status post radiation therapy:  1. Positive treatment response with significantly decreased FDG  activity of an irregular soft tissue lesion in the right lung apex,  there is increased surrounding fibroatelectatic changes suspected  secondary to radiation treatment.  2. No findings suspicious for additional metastatic disease within the  chest, abdomen or pelvis.  2a. Unchanged 6 mm left upper lobe lung nodule stable from 7/18/2019.     3. Pelvic ascites.  4. Multinodular  thyroid, similar to comparison examinations.  5. Inflammation of right axillary lymph nodes and shoulder musculature  secondary to recent Covid vaccination.       IMPRESSION: No clinical evidence for recurrent disease at this time. Her most recent scan demonstrates good metabolic response to treatment with evolving interval scarring consistent with radiation fibrosis. There is a right sided 3rd rib fracture, which is asymptomatic     RECOMMENDATIONS:  F/u in 3 months with repeat CT Chest, with NP.    Rakesh Poe MD-PhD PGY-5  Chief Radiation Oncology Resident, [facility]  [phone]    Telephone time: 11 minutes. 25 minutes spent on the date of the encounter doing chart review, review of test results, interpretation of tests, patient visit and documentation     I talked to the patient with the resident.  I agree with the resident's note and plan of care.      REYMUNDO Nj M.D.  Department of Radiation Oncology  [facility]

## 2021-05-09 ENCOUNTER — HEALTH MAINTENANCE LETTER (OUTPATIENT)
Age: 56
End: 2021-05-09

## 2021-10-24 ENCOUNTER — HEALTH MAINTENANCE LETTER (OUTPATIENT)
Age: 56
End: 2021-10-24

## 2022-06-05 ENCOUNTER — HEALTH MAINTENANCE LETTER (OUTPATIENT)
Age: 57
End: 2022-06-05

## 2022-08-24 ENCOUNTER — TELEPHONE (OUTPATIENT)
Dept: RADIATION ONCOLOGY | Facility: CLINIC | Age: 57
End: 2022-08-24

## 2022-08-24 NOTE — TELEPHONE ENCOUNTER
Julia called reporting intermittent pain for the last 30 days, sharp in nature, improves with ice temporarily, noticeable with inspiration and right arm movement (same shoulder that was replaced); localized to back shoulder blade area radiating around front. She was wondering if she should see Dr. Nj or PCP for work-up. Completed radiation treatment for Lung Cancer: RUL SBRT 5000 cGy in 10/04/19. Last follow up with Dr. Nj over a year ago.    Advised to call PCP for work-up, she asked if PCP couldn't get her in if she should go to ED, I suggested asking if another provider in the practice was available or using an URGENT as additional possibilities. Informed her that I would let Dr. Nj's team know of her concerns.

## 2022-10-15 ENCOUNTER — HEALTH MAINTENANCE LETTER (OUTPATIENT)
Age: 57
End: 2022-10-15

## 2023-06-11 ENCOUNTER — HEALTH MAINTENANCE LETTER (OUTPATIENT)
Age: 58
End: 2023-06-11

## 2023-10-29 ENCOUNTER — HEALTH MAINTENANCE LETTER (OUTPATIENT)
Age: 58
End: 2023-10-29

## 2024-05-26 ENCOUNTER — HEALTH MAINTENANCE LETTER (OUTPATIENT)
Age: 59
End: 2024-05-26

## (undated) RX ORDER — FENTANYL CITRATE 50 UG/ML
INJECTION, SOLUTION INTRAMUSCULAR; INTRAVENOUS
Status: DISPENSED
Start: 2019-08-12

## (undated) RX ORDER — OXYCODONE AND ACETAMINOPHEN 5; 325 MG/1; MG/1
TABLET ORAL
Status: DISPENSED
Start: 2019-08-12

## (undated) RX ORDER — SODIUM CHLORIDE 9 MG/ML
INJECTION, SOLUTION INTRAVENOUS
Status: DISPENSED
Start: 2019-08-12

## (undated) RX ORDER — HYDROMORPHONE HYDROCHLORIDE 1 MG/ML
INJECTION, SOLUTION INTRAMUSCULAR; INTRAVENOUS; SUBCUTANEOUS
Status: DISPENSED
Start: 2019-08-12

## (undated) RX ORDER — HYDROMORPHONE HCL/0.9% NACL/PF 0.2MG/0.2
SYRINGE (ML) INTRAVENOUS
Status: DISPENSED
Start: 2019-08-12

## (undated) RX ORDER — LIDOCAINE HYDROCHLORIDE 10 MG/ML
INJECTION, SOLUTION EPIDURAL; INFILTRATION; INTRACAUDAL; PERINEURAL
Status: DISPENSED
Start: 2019-08-12